# Patient Record
Sex: MALE | Race: OTHER | HISPANIC OR LATINO
[De-identification: names, ages, dates, MRNs, and addresses within clinical notes are randomized per-mention and may not be internally consistent; named-entity substitution may affect disease eponyms.]

---

## 2019-03-27 ENCOUNTER — APPOINTMENT (OUTPATIENT)
Dept: UROLOGY | Facility: CLINIC | Age: 59
End: 2019-03-27
Payer: MEDICAID

## 2019-03-27 VITALS
HEIGHT: 65 IN | WEIGHT: 187 LBS | DIASTOLIC BLOOD PRESSURE: 91 MMHG | HEART RATE: 54 BPM | SYSTOLIC BLOOD PRESSURE: 145 MMHG | BODY MASS INDEX: 31.16 KG/M2 | OXYGEN SATURATION: 99 %

## 2019-03-27 DIAGNOSIS — N50.811 RIGHT TESTICULAR PAIN: ICD-10-CM

## 2019-03-27 DIAGNOSIS — N20.0 CALCULUS OF KIDNEY: ICD-10-CM

## 2019-03-27 DIAGNOSIS — Z78.9 OTHER SPECIFIED HEALTH STATUS: ICD-10-CM

## 2019-03-27 LAB
APPEARANCE: CLEAR
BACTERIA: NEGATIVE
BILIRUBIN URINE: NEGATIVE
BLOOD URINE: NEGATIVE
COLOR: YELLOW
GLUCOSE QUALITATIVE U: NEGATIVE
HYALINE CASTS: 0 /LPF
KETONES URINE: NEGATIVE
LEUKOCYTE ESTERASE URINE: NEGATIVE
MICROSCOPIC-UA: NORMAL
NITRITE URINE: NEGATIVE
PH URINE: 6.5
PROTEIN URINE: NEGATIVE
RED BLOOD CELLS URINE: 2 /HPF
SPECIFIC GRAVITY URINE: 1.02
SQUAMOUS EPITHELIAL CELLS: 0 /HPF
UROBILINOGEN URINE: NORMAL
WHITE BLOOD CELLS URINE: 0 /HPF

## 2019-03-27 PROCEDURE — 99204 OFFICE O/P NEW MOD 45 MIN: CPT

## 2019-03-27 NOTE — HISTORY OF PRESENT ILLNESS
[FreeTextEntry1] : Referred for evaluation of kidney stones.\par Has prior hx of kidney stone about 8-9 years ago.  Reports being treated medically, no surgery.\par Recently onset of non specific left flank pain.  Has been told by PCP that has microscopic hematuria.  \par No dysuria, gross hematuria.  No recent UTI.  No other urinary complaints.\par \par Has chronic right testis pain that started after right inguinal hernia repair.  No swelling, erythema.  No associated N/V.

## 2019-03-27 NOTE — PHYSICAL EXAM
[General Appearance - Well Developed] : well developed [General Appearance - Well Nourished] : well nourished [Normal Appearance] : normal appearance [Well Groomed] : well groomed [General Appearance - In No Acute Distress] : no acute distress [Edema] : no peripheral edema [Respiration, Rhythm And Depth] : normal respiratory rhythm and effort [Exaggerated Use Of Accessory Muscles For Inspiration] : no accessory muscle use [Abdomen Soft] : soft [Abdomen Tenderness] : non-tender [Costovertebral Angle Tenderness] : no ~M costovertebral angle tenderness [Urethral Meatus] : meatus normal [Penis Abnormality] : normal uncircumcised penis [Urinary Bladder Findings] : the bladder was normal on palpation [Scrotum] : the scrotum was normal [Testes Mass (___cm)] : there were no testicular masses [No Prostate Nodules] : no prostate nodules [FreeTextEntry1] : No recurrent inguinal hernia. [Normal Station and Gait] : the gait and station were normal for the patient's age [] : no rash [No Focal Deficits] : no focal deficits [Oriented To Time, Place, And Person] : oriented to person, place, and time [Affect] : the affect was normal [Mood] : the mood was normal [Not Anxious] : not anxious [No Palpable Adenopathy] : no palpable adenopathy

## 2019-03-27 NOTE — REVIEW OF SYSTEMS
[Shortness Of Breath] : shortness of breath [Wheezing] : wheezing [Dizziness] : dizziness [Limb Weakness] : limb weakness [Difficulty Walking] : difficulty walking [Negative] : Heme/Lymph

## 2019-03-27 NOTE — ASSESSMENT
[FreeTextEntry1] : UA cytology today.\par CT urogram ordered.  Will call with results.\par All questions answered.

## 2019-04-12 ENCOUNTER — FORM ENCOUNTER (OUTPATIENT)
Age: 59
End: 2019-04-12

## 2019-04-12 LAB
BACTERIA UR CULT: NORMAL
URINE CYTOLOGY: NORMAL

## 2019-04-13 ENCOUNTER — OUTPATIENT (OUTPATIENT)
Dept: OUTPATIENT SERVICES | Facility: HOSPITAL | Age: 59
LOS: 1 days | End: 2019-04-13
Payer: MEDICAID

## 2019-04-13 ENCOUNTER — APPOINTMENT (OUTPATIENT)
Dept: CT IMAGING | Facility: CLINIC | Age: 59
End: 2019-04-13

## 2019-04-13 DIAGNOSIS — N20.0 CALCULUS OF KIDNEY: ICD-10-CM

## 2019-04-13 PROCEDURE — 74178 CT ABD&PLV WO CNTR FLWD CNTR: CPT | Mod: 26

## 2019-04-13 PROCEDURE — 74178 CT ABD&PLV WO CNTR FLWD CNTR: CPT

## 2019-04-17 PROBLEM — Z00.00 ENCOUNTER FOR PREVENTIVE HEALTH EXAMINATION: Noted: 2019-04-17

## 2019-05-01 ENCOUNTER — OTHER (OUTPATIENT)
Age: 59
End: 2019-05-01

## 2019-05-01 DIAGNOSIS — R91.1 SOLITARY PULMONARY NODULE: ICD-10-CM

## 2020-04-08 ENCOUNTER — EMERGENCY (EMERGENCY)
Facility: HOSPITAL | Age: 60
LOS: 0 days | Discharge: HOME | End: 2020-04-08
Payer: COMMERCIAL

## 2020-04-08 VITALS
RESPIRATION RATE: 15 BRPM | DIASTOLIC BLOOD PRESSURE: 102 MMHG | SYSTOLIC BLOOD PRESSURE: 155 MMHG | TEMPERATURE: 101 F | HEART RATE: 95 BPM | OXYGEN SATURATION: 99 %

## 2020-04-08 DIAGNOSIS — R07.0 PAIN IN THROAT: ICD-10-CM

## 2020-04-08 DIAGNOSIS — R05 COUGH: ICD-10-CM

## 2020-04-08 DIAGNOSIS — R50.9 FEVER, UNSPECIFIED: ICD-10-CM

## 2020-04-08 DIAGNOSIS — U07.1 COVID-19: ICD-10-CM

## 2020-04-08 PROCEDURE — 99283 EMERGENCY DEPT VISIT LOW MDM: CPT

## 2020-04-08 PROCEDURE — 87635 SARS-COV-2 COVID-19 AMP PRB: CPT

## 2020-04-08 NOTE — ED STATDOCS - PATIENT PORTAL LINK FT
You can access the FollowMyHealth Patient Portal offered by [facility] by registering at the following website: [email or web address]. By joining [employer]’s FollowMyHealth portal, you will also be able to view your health information using other applications (apps) compatible with our system.

## 2020-04-08 NOTE — ED STATDOCS - OBJECTIVE STATEMENT
Pt presents to ED with fever, cough, body aches, sore throat x 7 days. Pt not recently exposed to COVID-19. Pt here for testing.

## 2020-04-08 NOTE — ED STATDOCS - NSFOLLOWUPINSTRUCTIONS_ED_ALL_ED_FT
How to get your Coronavirus (COVID-19) Testing Results:   Please be advised that you were tested for the coronavirus (COVID-19) in the Emergency Department at [facility].  You are to maintain self-quarantine procedures for 14 days until instructed otherwise by one of our healthcare agents. Please note that the test may take up to 2-4 days to result.  If you do not hear from us within 72 hours and you'd like to check on your results, you can call on of our coronavirus specialists at [address] [place] [address] (available 24/7).  Please DO NOT call the site where you received the test to obtain your results.

## 2020-04-08 NOTE — ED ADULT NURSE NOTE - CAS ELECT INFOMATION PROVIDED
DC instructions/DISCHARGE INSTRUCTIONS REVIEWED WITH PATIENT VERBALLY, PT VERBALIZED UNDERSTANDING OF DISCHARGE INSTRUCTIONS. PAPER COPY OF DISCHARGE INSTRUCTIONS GIVEN TO PATIENT WITH SELF MALENA AND COVID 19 INFORMATION.

## 2020-04-08 NOTE — ED ADULT NURSE NOTE - CHIEF COMPLAINT QUOTE
fever, cough, covid car swab    PATIENT WAS TREATED, EVALUATED AND DISCHARGED BY INTAKE PROVIDER. PLEASE SEE PROVIDER NOTE FOR ASSESSMENT.

## 2020-04-10 LAB — SARS-COV-2 RNA SPEC QL NAA+PROBE: DETECTED

## 2020-04-15 ENCOUNTER — EMERGENCY (EMERGENCY)
Facility: HOSPITAL | Age: 60
LOS: 1 days | Discharge: HOME | End: 2020-04-15
Attending: STUDENT IN AN ORGANIZED HEALTH CARE EDUCATION/TRAINING PROGRAM | Admitting: STUDENT IN AN ORGANIZED HEALTH CARE EDUCATION/TRAINING PROGRAM
Payer: COMMERCIAL

## 2020-04-15 VITALS
OXYGEN SATURATION: 99 % | WEIGHT: 186.95 LBS | HEART RATE: 86 BPM | DIASTOLIC BLOOD PRESSURE: 96 MMHG | TEMPERATURE: 98 F | SYSTOLIC BLOOD PRESSURE: 165 MMHG | HEIGHT: 65 IN | RESPIRATION RATE: 15 BRPM

## 2020-04-15 VITALS
DIASTOLIC BLOOD PRESSURE: 89 MMHG | SYSTOLIC BLOOD PRESSURE: 159 MMHG | RESPIRATION RATE: 16 BRPM | HEART RATE: 84 BPM | OXYGEN SATURATION: 98 %

## 2020-04-15 PROCEDURE — 99283 EMERGENCY DEPT VISIT LOW MDM: CPT

## 2020-04-15 PROCEDURE — 99283 EMERGENCY DEPT VISIT LOW MDM: CPT | Mod: 25

## 2020-04-15 PROCEDURE — 71046 X-RAY EXAM CHEST 2 VIEWS: CPT

## 2020-04-15 PROCEDURE — 71046 X-RAY EXAM CHEST 2 VIEWS: CPT | Mod: 26

## 2020-04-15 NOTE — ED PROVIDER NOTE - CHPI ED SYMPTOMS NEG
no diaphoresis/no edema/no chest pain/no cough/no fever/no headache/no hemoptysis/no wheezing/no chills/no body aches

## 2020-04-15 NOTE — ED PROVIDER NOTE - PROGRESS NOTE DETAILS
Patient looks well, no respiratory distress and CXR clear.  Patient given instructions on albuterol hand-held inhaler and given one to go home with.  D/c instructions given to patient and son who live together. Patient advised to f/u with PMD and return for any concerns.  COVID instructions given

## 2020-04-15 NOTE — ED ADULT TRIAGE NOTE - CHIEF COMPLAINT QUOTE
Pt states he is Covid-19 +, no fever x 4 days, onset 10 days ago. Pt states today he is feeling like he has trouble breathing

## 2020-04-15 NOTE — ED PROVIDER NOTE - OBJECTIVE STATEMENT
60 year old male with no significant PMH presents with SOB.  Patient was diagnosed with COVID on 4/4 at urgent care.  He was sent home on antibiotics x 5 days and was feeling better.  He reports no fever x 5 days.  However, he has been suffering from intermittent SOB at rest for the past several days.  No chest pain, cough.  Today he felt the dyspnea worsened.  He is not a smoker. Patient lives with his son.  PMD Dr Coats

## 2020-04-15 NOTE — ED PROVIDER NOTE - CLINICAL SUMMARY MEDICAL DECISION MAKING FREE TEXT BOX
60 year old male diagnosed with COVID on 4/4 presents with intermittent SOB, feeling worse today.  No chest pain, cough, fever.  Non-toxic in appearance.  O2 sat 99% on RA, afebrile.  CXR, observe, reassess

## 2020-04-15 NOTE — ED PROVIDER NOTE - CARE PROVIDER_API CALL
Byron Coats)  Medicine  [address] [place] [id number]  Phone: [phone]  Fax: [phone]  Follow Up Time:

## 2020-04-15 NOTE — ED PROVIDER NOTE - NSFOLLOWUPINSTRUCTIONS_ED_ALL_ED_FT
Please take the albuterol inhaler as prescribed and follow up with your primary care doctor.  Drink plenty of fluids and take Tylenol for pain or fever.  Return to the ER for persistent cough, fever, shortness of breath, respiratory distress, or any other concerns.

## 2020-04-15 NOTE — ED PROVIDER NOTE - PATIENT PORTAL LINK FT
You can access the FollowMyHealth Patient Portal offered by [facility] by registering at the following website: [email or web address]. By joining [employer]’s FollowMyHealth portal, you will also be able to view your health information using other applications (apps) compatible with our system.

## 2020-04-25 ENCOUNTER — EMERGENCY (EMERGENCY)
Facility: HOSPITAL | Age: 60
LOS: 1 days | Discharge: HOME | End: 2020-04-25
Attending: EMERGENCY MEDICINE | Admitting: EMERGENCY MEDICINE
Payer: COMMERCIAL

## 2020-04-25 VITALS
RESPIRATION RATE: 16 BRPM | TEMPERATURE: 99 F | HEART RATE: 89 BPM | SYSTOLIC BLOOD PRESSURE: 135 MMHG | OXYGEN SATURATION: 98 % | DIASTOLIC BLOOD PRESSURE: 83 MMHG

## 2020-04-25 VITALS
SYSTOLIC BLOOD PRESSURE: 162 MMHG | RESPIRATION RATE: 15 BRPM | TEMPERATURE: 98 F | DIASTOLIC BLOOD PRESSURE: 85 MMHG | OXYGEN SATURATION: 99 % | HEART RATE: 86 BPM

## 2020-04-25 LAB
ALBUMIN SERPL ELPH-MCNC: 4.1 G/DL — SIGNIFICANT CHANGE UP (ref 3.3–5)
ALP SERPL-CCNC: 104 U/L — SIGNIFICANT CHANGE UP (ref 40–120)
ALT FLD-CCNC: 36 U/L — SIGNIFICANT CHANGE UP (ref 12–78)
ANION GAP SERPL CALC-SCNC: 8 MMOL/L — SIGNIFICANT CHANGE UP (ref 5–17)
AST SERPL-CCNC: 28 U/L — SIGNIFICANT CHANGE UP (ref 15–37)
BASOPHILS # BLD AUTO: 0.02 K/UL — SIGNIFICANT CHANGE UP (ref 0–0.2)
BASOPHILS NFR BLD AUTO: 0.3 % — SIGNIFICANT CHANGE UP (ref 0–2)
BILIRUB SERPL-MCNC: 0.7 MG/DL — SIGNIFICANT CHANGE UP (ref 0.2–1.2)
BUN SERPL-MCNC: 12 MG/DL — SIGNIFICANT CHANGE UP (ref 7–23)
CALCIUM SERPL-MCNC: 9.1 MG/DL — SIGNIFICANT CHANGE UP (ref 8.5–10.1)
CHLORIDE SERPL-SCNC: 106 MMOL/L — SIGNIFICANT CHANGE UP (ref 96–108)
CK MB BLD-MCNC: <1.8 % — SIGNIFICANT CHANGE UP (ref 0–3.5)
CK MB CFR SERPL CALC: <1 NG/ML — SIGNIFICANT CHANGE UP (ref 0–3.6)
CK SERPL-CCNC: 57 U/L — SIGNIFICANT CHANGE UP (ref 26–308)
CO2 SERPL-SCNC: 25 MMOL/L — SIGNIFICANT CHANGE UP (ref 22–31)
CREAT SERPL-MCNC: 1 MG/DL — SIGNIFICANT CHANGE UP (ref 0.5–1.3)
D DIMER BLD IA.RAPID-MCNC: 154 NG/ML DDU — SIGNIFICANT CHANGE UP
EOSINOPHIL # BLD AUTO: 0.08 K/UL — SIGNIFICANT CHANGE UP (ref 0–0.5)
EOSINOPHIL NFR BLD AUTO: 1.2 % — SIGNIFICANT CHANGE UP (ref 0–6)
GLUCOSE SERPL-MCNC: 117 MG/DL — HIGH (ref 70–99)
HCT VFR BLD CALC: 42.4 % — SIGNIFICANT CHANGE UP (ref 39–50)
HGB BLD-MCNC: 14.8 G/DL — SIGNIFICANT CHANGE UP (ref 13–17)
IMM GRANULOCYTES NFR BLD AUTO: 0.3 % — SIGNIFICANT CHANGE UP (ref 0–1.5)
INR BLD: 1.08 RATIO — SIGNIFICANT CHANGE UP (ref 0.88–1.16)
LYMPHOCYTES # BLD AUTO: 2.51 K/UL — SIGNIFICANT CHANGE UP (ref 1–3.3)
LYMPHOCYTES # BLD AUTO: 38 % — SIGNIFICANT CHANGE UP (ref 13–44)
MCHC RBC-ENTMCNC: 32.7 PG — SIGNIFICANT CHANGE UP (ref 27–34)
MCHC RBC-ENTMCNC: 34.9 GM/DL — SIGNIFICANT CHANGE UP (ref 32–36)
MCV RBC AUTO: 93.6 FL — SIGNIFICANT CHANGE UP (ref 80–100)
MONOCYTES # BLD AUTO: 0.55 K/UL — SIGNIFICANT CHANGE UP (ref 0–0.9)
MONOCYTES NFR BLD AUTO: 8.3 % — SIGNIFICANT CHANGE UP (ref 2–14)
NEUTROPHILS # BLD AUTO: 3.43 K/UL — SIGNIFICANT CHANGE UP (ref 1.8–7.4)
NEUTROPHILS NFR BLD AUTO: 51.9 % — SIGNIFICANT CHANGE UP (ref 43–77)
NRBC # BLD: 0 /100 WBCS — SIGNIFICANT CHANGE UP (ref 0–0)
PLATELET # BLD AUTO: 198 K/UL — SIGNIFICANT CHANGE UP (ref 150–400)
POTASSIUM SERPL-MCNC: 4.1 MMOL/L — SIGNIFICANT CHANGE UP (ref 3.5–5.3)
POTASSIUM SERPL-SCNC: 4.1 MMOL/L — SIGNIFICANT CHANGE UP (ref 3.5–5.3)
PROT SERPL-MCNC: 8 G/DL — SIGNIFICANT CHANGE UP (ref 6–8.3)
PROTHROM AB SERPL-ACNC: 12.1 SEC — SIGNIFICANT CHANGE UP (ref 10–12.9)
RBC # BLD: 4.53 M/UL — SIGNIFICANT CHANGE UP (ref 4.2–5.8)
RBC # FLD: 12.4 % — SIGNIFICANT CHANGE UP (ref 10.3–14.5)
SODIUM SERPL-SCNC: 139 MMOL/L — SIGNIFICANT CHANGE UP (ref 135–145)
TROPONIN I SERPL-MCNC: <.015 NG/ML — SIGNIFICANT CHANGE UP (ref 0.01–0.04)
WBC # BLD: 6.61 K/UL — SIGNIFICANT CHANGE UP (ref 3.8–10.5)
WBC # FLD AUTO: 6.61 K/UL — SIGNIFICANT CHANGE UP (ref 3.8–10.5)

## 2020-04-25 PROCEDURE — 71045 X-RAY EXAM CHEST 1 VIEW: CPT

## 2020-04-25 PROCEDURE — 71045 X-RAY EXAM CHEST 1 VIEW: CPT | Mod: 26

## 2020-04-25 PROCEDURE — 80053 COMPREHEN METABOLIC PANEL: CPT

## 2020-04-25 PROCEDURE — 85027 COMPLETE CBC AUTOMATED: CPT

## 2020-04-25 PROCEDURE — 93010 ELECTROCARDIOGRAM REPORT: CPT

## 2020-04-25 PROCEDURE — 82550 ASSAY OF CK (CPK): CPT

## 2020-04-25 PROCEDURE — 82553 CREATINE MB FRACTION: CPT

## 2020-04-25 PROCEDURE — 84484 ASSAY OF TROPONIN QUANT: CPT

## 2020-04-25 PROCEDURE — 85610 PROTHROMBIN TIME: CPT

## 2020-04-25 PROCEDURE — 36415 COLL VENOUS BLD VENIPUNCTURE: CPT

## 2020-04-25 PROCEDURE — 99283 EMERGENCY DEPT VISIT LOW MDM: CPT | Mod: 25

## 2020-04-25 PROCEDURE — 99284 EMERGENCY DEPT VISIT MOD MDM: CPT

## 2020-04-25 PROCEDURE — 85379 FIBRIN DEGRADATION QUANT: CPT

## 2020-04-25 PROCEDURE — 93005 ELECTROCARDIOGRAM TRACING: CPT

## 2020-04-25 NOTE — ED PROVIDER NOTE - NSFOLLOWUPINSTRUCTIONS_ED_ALL_ED_FT
COVID 19, también conocido bowen enfermedad por coronavirus o nuevo coronavirus, es un tipo de virus que causa enfermedad respiratoria. [place] puede derivar en inflamación y la acumulación de mucosidad y líquidos en las vías respiratorias de los pulmones (neumonia). Hay diferentes tipos de coronavirus. La mayoría de estos virus sólo afectan a los animales, adriano a veces, estos virus pueden mutar e infectar a las personas.  ¿Cuáles son las causas?  Esta enfermedad es causada por un virus. Usted puede contagiarse con jeanie virus:  Al aspirar las gotitas que jazmyn persona infectada elimina al toser o estornudar.Al tocar algo, bowen jazmyn lynn o el pomo de jazmyn yu, que estuvo expuesto al virus (contaminado) y luego tocarse la boca, nariz o los ojos.Estar cerca de animales que transmiten el virus o comer carne cruda o poco cocida, o productos de origen animal que contengan el virus.¿Qué incrementa el riesgo?  Es más probable que tengan esta afección las personas que:  Viven o viajan a jazmyn joe donde hay un brote de COVID-19.Entran en contacto con jazmyn persona enferma que recientemente viajó a jazmyn joe con un brote de COVID-19.Cuidan o viven con jazmyn persona infectada por el COVID-19.¿Cuáles son los signos o los síntomas?  El COVID-19 causa jazmyn enfermedad respiratoria que puede ocasionar neumonía. Los síntomas de la neumonía incluyen los siguientes:  Fiebre.Tos.Dificultad para respirar.¿Cómo se diagnostica?  Esta afección se puede diagnosticar en función de lo siguiente:  Rayne signos y síntomas, especialmente si:  Vive en jazmyn joe donde hay un brote de COVID-19.Viajó recientemente a jazmyn joe donde el virus es frecuente.Cuida o vive con jazmyn persona a quien se le diagnosticó el COVID-19.Un examen físico.Análisis de laboratorio que pueden incluir:  Un hisopado nasal para esther jazmyn muestra de líquido de la nariz.Un hisopado de garganta para esther jazmyn muestra de líquido de la garganta.Jazmyn muestra de mucosidad de los pulmones (esputo).Análisis de serenity.¿Cómo se trata?  No hay ningún medicamento para tratar el COVID-19. El médico le informará sobre las maneras de tratar los síntomas. Estos pueden incluir reposo, líquidos y medicamentos de venta judson.  Siga estas indicaciones en hernadez casa:  Estilo de ramy     Use un humidificador de aire frío para agregar humedad al aire. [place] puede ayudarlo a que respire mejor.No consuma ningún producto que contenga nicotina o tabaco, bowen cigarrillos, cigarrillos electrónicos y tabaco de mascar. Si necesita ayuda para dejar de fumar, consulte al médico.[place] reposo en hernadez casa bowen se lo haya indicado el médico.Retome rayne actividades normales según lo indicado por el médico. Pregúntele al médico qué actividades son seguras para usted.Indicaciones generales     Use los medicamentos de venta judson y los recetados solamente bowen se lo haya indicado el médico.Paulina suficiente líquido bowen para mantener la orina de color amarillo pálido.Concurra a todas las visitas de seguimiento bowen se lo haya indicado el médico. [place] es importante.¿Cómo se aydee?     No hay ninguna vacuna que ayude a prevenir la infección por el COVID-19. Sin embargo, hay medidas que puede esther para protegerse y proteger a otras personas de jeanie virus.  Para protegerse:     No viaje a zonas donde el COVID-19 sea un riesgo. Las zonas donde se informa el coronavirus cambian con frecuencia. Para identificar las zonas de alto riesgo, consulte el sitio web de viajes de los Centers for Disease Control and Prevention (Centros para el Control y Prevención de Enfermedades) wwwnc.cdc.gov/travel/noticesSi vive o debe viajar a jazmyn joe donde el coronavirus es un riesgo, tome precauciones para evitar infecciones.  Aléjese de las personas enfermas.Aléjese de lugares donde haya animales que puedan transmitir el virus. Estos incluyen lugares donde se venden los animales y los productos de origen animal. Tenga en cuenta que tanto los animales vivos bowen los muertos pueden transmitir el virus.Lávese las sophie frecuentemente con agua y jabón. Use desinfectante para sophie con alcohol si no dispone de agua y jabón.Evite tocarse la boca, la david, los ojos o la nariz.Cómo proteger a los demás:     Si tiene síntomas, tome medidas para evitar que el virus se propague a otras personas.  Si erik que tiene jazmyn infección por coronavirus, comuníquese de inmediato con hernadez médico. Informe al equipo de atención médica que erik que puede tener jazmyn infección por el COVID-19.Quédese en hernadez casa. Salga de hernadez casa solo para buscar atención médica.No viaje mientras esté enfermo.Lávese las sophie frecuentemente con agua y jabón. Use desinfectante para sophie con alcohol si no dispone de agua y jabón.Manténgase alejado de quienes vivan con usted. Si es posible, permanezca en hernadez habitación, separado de los demás. Utilice un baño diferente.Asegúrese de que todas las personas que viven en hernadez casa se laven tyson las sophie y con frecuencia.Tosa o estornude en un pañuelo de papel o sobre hernadez manga o codo. No tosa o estornude al aire ni se cubra la boca o la nariz con la mano.Use un barbijo.Dónde buscar más información  Centers for Disease Control and Prevention (Centros para el Control y la Prevención de Enfermedades): www.cdc.gov/coronavirus/2019-ncov/index.htmlWor Health Organization (Organización Mundial de la [place]): www.who.int/health-topics/coronavirusComuníquese con un médico si:  Ha viajado a jazmyn joe donde el COVID-19 es un riesgo y tiene síntomas de infección.Tiene contacto con alguien que ha viajado a jazmyn joe donde el COVID-19 es un riesgo y usted tiene síntomas de infección.Solicite ayuda inmediatamente si:  Tiene dificultad para respirar.Siente dolor en el pecho.Resumen  El COVID-19 es causado por un tipo de virus que causa enfermedad respiratoria. [place] puede derivar en inflamación y la acumulación de mucosidad y líquidos en las vías respiratorias de los pulmones (neumonia).Es más probable que desarrolle esta afección si vive o viaja a jazmyn joe con un brote del COVID-19.No hay ningún medicamento para tratar el COVID-19. El médico le informará sobre las maneras de tratar los síntomas. [place] medidas para protegerse y proteger a los demás contra las infecciones. Lávese las sophie con frecuencia. Manténgase alejado de otras personas que estén enfermas y use un barbijo si está enfermo. Esta información no tiene bowen fin reemplazar el consejo del médico. Asegúrese de hacerle al médico cualquier pregunta que tenga.    Document Released: 02/14/2020 Document Revised: 04/01/2020 Document Reviewed: 02/15/2020  Elsevier Patient Education © 2020 Elsevier Inc.

## 2020-04-25 NOTE — ED ADULT NURSE REASSESSMENT NOTE - NS ED NURSE REASSESS COMMENT FT1
Pt. provide with pulse oximeter for discharge home. Re-enforced instructions for use of pulse oximeter after MD. Pt. verbalized understanding of education provided.

## 2020-04-25 NOTE — ED PROVIDER NOTE - PATIENT PORTAL LINK FT
You can access the FollowMyHealth Patient Portal offered by [facility] by registering at the following website: [email or web address]. By joining [employer]’s FollowMyHealth portal, you will also be able to view your health information using other applications (apps) compatible with our system.

## 2020-04-25 NOTE — ED ADULT NURSE NOTE - OBJECTIVE STATEMENT
Pt. complaining of chest pressure and burning x 5hours with intermittent radiation to generalized body. Pt. denied fevers, stated he was previously diagnosed Covid-19 positive.

## 2020-04-25 NOTE — ED PROVIDER NOTE - OBJECTIVE STATEMENT
60 male presents to ER c/o chest discomfort. Patient states 4/8/20 he went to [facility] for fever, sore throat, tested positive for covid, not requiring o2, sent home, came to [facility] ER 4/15/20 for shortness of breath, had chest xray and read as lungs clear, o2 sat normal, dc home, patient states fever and cough has resolved, feeling left sided chest discomfort with some shortness of breath.

## 2020-04-25 NOTE — ED PROVIDER NOTE - CARE PROVIDER_API CALL
Byron Coats)  Medicine  [address] [place] [address]  Phone: [phone]  Fax: [phone]  Follow Up Time:

## 2020-05-09 ENCOUNTER — EMERGENCY (EMERGENCY)
Facility: HOSPITAL | Age: 60
LOS: 1 days | Discharge: HOME | End: 2020-05-09
Attending: EMERGENCY MEDICINE | Admitting: EMERGENCY MEDICINE
Payer: COMMERCIAL

## 2020-05-09 VITALS
OXYGEN SATURATION: 98 % | HEART RATE: 70 BPM | TEMPERATURE: 98 F | RESPIRATION RATE: 18 BRPM | SYSTOLIC BLOOD PRESSURE: 146 MMHG | DIASTOLIC BLOOD PRESSURE: 86 MMHG

## 2020-05-09 VITALS
HEART RATE: 79 BPM | SYSTOLIC BLOOD PRESSURE: 171 MMHG | RESPIRATION RATE: 18 BRPM | DIASTOLIC BLOOD PRESSURE: 99 MMHG | OXYGEN SATURATION: 99 % | TEMPERATURE: 98 F | HEIGHT: 65 IN | WEIGHT: 179.9 LBS

## 2020-05-09 LAB
ALBUMIN SERPL ELPH-MCNC: 4.1 G/DL — SIGNIFICANT CHANGE UP (ref 3.3–5)
ALP SERPL-CCNC: 103 U/L — SIGNIFICANT CHANGE UP (ref 40–120)
ALT FLD-CCNC: 34 U/L — SIGNIFICANT CHANGE UP (ref 12–78)
ANION GAP SERPL CALC-SCNC: 9 MMOL/L — SIGNIFICANT CHANGE UP (ref 5–17)
AST SERPL-CCNC: 26 U/L — SIGNIFICANT CHANGE UP (ref 15–37)
BILIRUB SERPL-MCNC: 1 MG/DL — SIGNIFICANT CHANGE UP (ref 0.2–1.2)
BUN SERPL-MCNC: 9 MG/DL — SIGNIFICANT CHANGE UP (ref 7–23)
CALCIUM SERPL-MCNC: 9.1 MG/DL — SIGNIFICANT CHANGE UP (ref 8.5–10.1)
CHLORIDE SERPL-SCNC: 107 MMOL/L — SIGNIFICANT CHANGE UP (ref 96–108)
CO2 SERPL-SCNC: 22 MMOL/L — SIGNIFICANT CHANGE UP (ref 22–31)
CREAT SERPL-MCNC: 0.78 MG/DL — SIGNIFICANT CHANGE UP (ref 0.5–1.3)
GLUCOSE SERPL-MCNC: 99 MG/DL — SIGNIFICANT CHANGE UP (ref 70–99)
HCT VFR BLD CALC: 41.1 % — SIGNIFICANT CHANGE UP (ref 39–50)
HGB BLD-MCNC: 14.7 G/DL — SIGNIFICANT CHANGE UP (ref 13–17)
MAGNESIUM SERPL-MCNC: 2.1 MG/DL — SIGNIFICANT CHANGE UP (ref 1.6–2.6)
MCHC RBC-ENTMCNC: 32.9 PG — SIGNIFICANT CHANGE UP (ref 27–34)
MCHC RBC-ENTMCNC: 35.8 GM/DL — SIGNIFICANT CHANGE UP (ref 32–36)
MCV RBC AUTO: 91.9 FL — SIGNIFICANT CHANGE UP (ref 80–100)
NRBC # BLD: 0 /100 WBCS — SIGNIFICANT CHANGE UP (ref 0–0)
PLATELET # BLD AUTO: 237 K/UL — SIGNIFICANT CHANGE UP (ref 150–400)
POTASSIUM SERPL-MCNC: 3.8 MMOL/L — SIGNIFICANT CHANGE UP (ref 3.5–5.3)
POTASSIUM SERPL-SCNC: 3.8 MMOL/L — SIGNIFICANT CHANGE UP (ref 3.5–5.3)
PROT SERPL-MCNC: 7.9 G/DL — SIGNIFICANT CHANGE UP (ref 6–8.3)
RBC # BLD: 4.47 M/UL — SIGNIFICANT CHANGE UP (ref 4.2–5.8)
RBC # FLD: 12.8 % — SIGNIFICANT CHANGE UP (ref 10.3–14.5)
SODIUM SERPL-SCNC: 138 MMOL/L — SIGNIFICANT CHANGE UP (ref 135–145)
TROPONIN I SERPL-MCNC: <.015 NG/ML — SIGNIFICANT CHANGE UP (ref 0.01–0.04)
WBC # BLD: 6.07 K/UL — SIGNIFICANT CHANGE UP (ref 3.8–10.5)
WBC # FLD AUTO: 6.07 K/UL — SIGNIFICANT CHANGE UP (ref 3.8–10.5)

## 2020-05-09 PROCEDURE — 71045 X-RAY EXAM CHEST 1 VIEW: CPT

## 2020-05-09 PROCEDURE — 99284 EMERGENCY DEPT VISIT MOD MDM: CPT | Mod: 25

## 2020-05-09 PROCEDURE — 93010 ELECTROCARDIOGRAM REPORT: CPT

## 2020-05-09 PROCEDURE — 96374 THER/PROPH/DIAG INJ IV PUSH: CPT

## 2020-05-09 PROCEDURE — 71045 X-RAY EXAM CHEST 1 VIEW: CPT | Mod: 26

## 2020-05-09 PROCEDURE — 99284 EMERGENCY DEPT VISIT MOD MDM: CPT

## 2020-05-09 PROCEDURE — 84484 ASSAY OF TROPONIN QUANT: CPT

## 2020-05-09 PROCEDURE — 93005 ELECTROCARDIOGRAM TRACING: CPT

## 2020-05-09 PROCEDURE — 85027 COMPLETE CBC AUTOMATED: CPT

## 2020-05-09 PROCEDURE — 83735 ASSAY OF MAGNESIUM: CPT

## 2020-05-09 PROCEDURE — 36415 COLL VENOUS BLD VENIPUNCTURE: CPT

## 2020-05-09 PROCEDURE — 80053 COMPREHEN METABOLIC PANEL: CPT

## 2020-05-09 RX ORDER — KETOROLAC TROMETHAMINE 30 MG/ML
15 SYRINGE (ML) INJECTION ONCE
Refills: 0 | Status: DISCONTINUED | OUTPATIENT
Start: 2020-05-09 | End: 2020-05-09

## 2020-05-09 RX ADMIN — Medication 15 MILLIGRAM(S): at 19:24

## 2020-05-09 NOTE — ED ADULT NURSE NOTE - OBJECTIVE STATEMENT
Pt received in bed alert and oriented and resting in bed with the c/o htn since 3am today. As per Md's orders IV rowdy placed blood specimen obtained and sent to the lab. Pt stable and VS taken and charted. Pt denies any pain or discomfort as of this time. Nursing care ongoing and safety maintained.

## 2020-05-09 NOTE — ED PROVIDER NOTE - CARE PROVIDER_API CALL
Kirill Hair)  Cardiovascular Disease  [address] [place] [address]  Phone: [phone]  Fax: [phone]  Follow Up Time:

## 2020-05-09 NOTE — ED PROVIDER NOTE - OBJECTIVE STATEMENT
59 yo male with recent dx of HTN (started on metoprolol 25 mg qd on 5/7) presents to the ED c/o intermittent left sided, nonradiating, chest discomfort x 1 month. Patient diagnosed COVID+ on 4/8. Since then patient has been having chest discomfort. not associated with exertion or inspiration. + associated with SOB. Denies fever, chills, cough, abd pain, N/V/D, urinary sxs, flank pain. nonsmoker. no previous cardiac workup. no family hx CAD.

## 2020-05-09 NOTE — ED PROVIDER NOTE - PHYSICAL EXAMINATION
GENERAL:  well appearing, non-toxic male in no acute distress  SKIN: skin warm, pink and dry. MMM. no rash to chest wall. cap refill < 2 sec   ENT:  Airway intact. Patent oropharynx without erythema  NECK: Neck supple. No meningismus, or JVD. Trachea midline  PULM: CTAB. Normal respiratory effort. No respiratory distress. No wheezes, stridor, rales or rhonchi. No retractions  CV: RRR, no M/R/G.   ABD: Soft, non-tender, non-distended  MSK: FROM of all extremities. + left sided chest wall tenderness. radial pulses equal and intact bilaterally. no calf swelling or tenderness.   neuro: A+Ox2. no sensory or motor deficits, CN II-XII intact. No speech slurring, pronator drift, facial asymmetry. Normal finger-to-nose  b/l. 5/5 strength throughout. Normal gait. Negative Romberg.  PSYCH: appropriate behavior, cooperative.

## 2020-05-09 NOTE — ED PROVIDER NOTE - PATIENT PORTAL LINK FT
You can access the FollowMyHealth Patient Portal offered by [facility] by registering at the following website: [email or web address]. By joining [employer]’s FollowMyHealth portal, you will also be able to view your health information using other applications (apps) compatible with our system.

## 2020-05-09 NOTE — ED ADULT NURSE REASSESSMENT NOTE - NS ED NURSE REASSESS COMMENT FT1
Received report from Maryann COPELAND. Patient resting in bed on cardiac monitor. Safety and comfort measures provided and maintained.

## 2020-05-09 NOTE — ED PROVIDER NOTE - ATTENDING CONTRIBUTION TO CARE
Pt is a 59 yo male who presents to the ED with a cc of hypertension.  PMHx of hypertension.  Pt reports that he had no significant past medical history prior to his diagnosis of COVID.  He had first noted symptoms of this infection around 4/1.  Prior to that he had been working at [employer] so he has unknown sick contacts.  Pt reports that early April he noted that he was experiencing fevers, chills, diffuse myalgias and a sore throat.  He was confirmed COVID positive around 4/4.  Pt was then again seen in the ED on 4/8 for similar symptoms and was discharged home.  He again returned to the ED on 4/15 for SOB he was elv and discharged home.  Again he was seen on 4/25 with c/o left sided chest pain and SOB was worked up and discharged home.  He reports that during this time it was noted that his blood pressures were elevated and so he followed up with his PMD on Thursday and was started on Metoprolol.  Pt reports that  he is no longer experiencing fevers, diffuse muscle pain, or sore throat. He still has intermittent chills and notes that several times a day he feels like his hands and feet are very cold bilaterally.  He further goes on to state that when his blood pressure is elevated he does have a mild HA which resolves when his blood pressure corrects.  Today he reports that he awoke around 3 am with chest pressure tightness and mild SOB.  He also reported a left sided HA at that time which has since resolved.  He noted that his blood pressure was elevated and states that he did take his Metoprolol around 6 am but did not note a significant change so he became concerned and came to the ED for further work up.  Upon arrival he does admit to significant improvement in symptoms but still has mild chest pressure.  He further reports that he has been experiencing a non productive cough. Pt currently denies HA, visual changes, N/V/D/C, abd pain, ext numbness or weakness.  Pain is not pleuritic in nature and is not worsened with exertion.  No increased discomfort upon leaning forward or lying back although pt does report that the pain does appear to be reproducible in nature.  Pt has no known underlying CAD and has not followed with a cardiologist.  On exam pt lying in bed NAD, NCAT, PERRL, EOMI, heart RRR, lungs CTA, abd soft NT/ND.  No TTP to bilateral temporal arteries.  FROM of neck with no meningeal signs.  CN II-XII intact with no focal deficits, no ataxia noted.  At this time BP has significantly improved.  Pt with TTP to left lateral chest wall on exam no overlying skin changes.  Pt with chest discomfort in the setting of known COVID and recent HTN diagnosis.  Has just started medication for BP on Thursday and at this time BP stable.  Will obtain screening labs, CE, EKG, chest x-ray.  As symptoms began at 3 am only one set of enzymes is required.  Pain is reproducible and is likely more related to underlying costochondritis chest wall strain given  recent viral illness.  Will medicate for pain.  Will monitor BP.  Discussed with pt need to follow up with PMD or cardiology for possible medication adjustment. As BP stable at this time will not adjust medications.  Pt with no hypoxia noted and no signs of respiratory distress.

## 2020-05-09 NOTE — ED ADULT TRIAGE NOTE - CHIEF COMPLAINT QUOTE
Presents with complaints of high blood pressure. States with his blood pressure being elevated he feels chest pressure, head pressure and very weak.

## 2020-05-09 NOTE — ED PROVIDER NOTE - CARE PLAN
Principal Discharge DX:	Chest pain  Secondary Diagnosis:	Hypertension Principal Discharge DX:	Chest pain  Secondary Diagnosis:	Hypertension  Secondary Diagnosis:	Chest wall pain

## 2020-05-09 NOTE — ED ADULT NURSE NOTE - SUICIDE SCREENING QUESTION 3
July 4, 2019      Isela Gray, NP  [address] [facility] [place] [address]           [facility]  [address]  [place] [address]  Phone: [phone]  Fax: [phone]          Patient: Hitesh Burns   MR Number: [id number]   YOB: 1979   Date of Visit: 7/3/2019       Dear Isela Gray:    Thank you for referring Hitesh Burns to me for evaluation. Attached you will find relevant portions of my assessment and plan of care.    If you have questions, please do not hesitate to call me. I look forward to following Hitesh Burns along with you.    Sincerely,    Leana Dave MD    Enclosure  CC:  No Recipients    If you would like to receive this communication electronically, please contact [email or web address] or [phone] to request more information on [employer] Link access.    For providers and/or their staff who would like to refer a patient to [facility], please contact us through our one-stop-shop provider referral line, [facility], at [phone].    If you feel you have received this communication in error or would no longer like to receive these types of communications, please e-mail [email or web address]         
No

## 2020-05-09 NOTE — ED PROVIDER NOTE - CLINICAL SUMMARY MEDICAL DECISION MAKING FREE TEXT BOX
Patient here for chest discomfort, sob x 1 month. ekg, labs, cxr unremarkable. REcommend follow up with pmd and cardiologist

## 2020-05-23 ENCOUNTER — EMERGENCY (EMERGENCY)
Facility: HOSPITAL | Age: 60
LOS: 1 days | Discharge: HOME | End: 2020-05-23
Attending: EMERGENCY MEDICINE | Admitting: EMERGENCY MEDICINE
Payer: COMMERCIAL

## 2020-05-23 VITALS
DIASTOLIC BLOOD PRESSURE: 84 MMHG | WEIGHT: 177.03 LBS | HEART RATE: 90 BPM | SYSTOLIC BLOOD PRESSURE: 160 MMHG | HEIGHT: 65 IN | TEMPERATURE: 98 F | RESPIRATION RATE: 18 BRPM | OXYGEN SATURATION: 96 %

## 2020-05-23 VITALS
HEART RATE: 59 BPM | DIASTOLIC BLOOD PRESSURE: 75 MMHG | SYSTOLIC BLOOD PRESSURE: 159 MMHG | OXYGEN SATURATION: 96 % | TEMPERATURE: 98 F | RESPIRATION RATE: 16 BRPM

## 2020-05-23 LAB
ALBUMIN SERPL ELPH-MCNC: 4.2 G/DL — SIGNIFICANT CHANGE UP (ref 3.3–5)
ALP SERPL-CCNC: 103 U/L — SIGNIFICANT CHANGE UP (ref 40–120)
ALT FLD-CCNC: 30 U/L — SIGNIFICANT CHANGE UP (ref 12–78)
ANION GAP SERPL CALC-SCNC: 8 MMOL/L — SIGNIFICANT CHANGE UP (ref 5–17)
AST SERPL-CCNC: 24 U/L — SIGNIFICANT CHANGE UP (ref 15–37)
BILIRUB SERPL-MCNC: 1 MG/DL — SIGNIFICANT CHANGE UP (ref 0.2–1.2)
BUN SERPL-MCNC: 16 MG/DL — SIGNIFICANT CHANGE UP (ref 7–23)
CALCIUM SERPL-MCNC: 9 MG/DL — SIGNIFICANT CHANGE UP (ref 8.5–10.1)
CHLORIDE SERPL-SCNC: 105 MMOL/L — SIGNIFICANT CHANGE UP (ref 96–108)
CO2 SERPL-SCNC: 22 MMOL/L — SIGNIFICANT CHANGE UP (ref 22–31)
CREAT SERPL-MCNC: 0.97 MG/DL — SIGNIFICANT CHANGE UP (ref 0.5–1.3)
D DIMER BLD IA.RAPID-MCNC: <150 NG/ML DDU — SIGNIFICANT CHANGE UP
GLUCOSE SERPL-MCNC: 103 MG/DL — HIGH (ref 70–99)
HCT VFR BLD CALC: 42.3 % — SIGNIFICANT CHANGE UP (ref 39–50)
HGB BLD-MCNC: 15.2 G/DL — SIGNIFICANT CHANGE UP (ref 13–17)
MCHC RBC-ENTMCNC: 33.2 PG — SIGNIFICANT CHANGE UP (ref 27–34)
MCHC RBC-ENTMCNC: 35.9 GM/DL — SIGNIFICANT CHANGE UP (ref 32–36)
MCV RBC AUTO: 92.4 FL — SIGNIFICANT CHANGE UP (ref 80–100)
NRBC # BLD: 0 /100 WBCS — SIGNIFICANT CHANGE UP (ref 0–0)
PLATELET # BLD AUTO: 199 K/UL — SIGNIFICANT CHANGE UP (ref 150–400)
POTASSIUM SERPL-MCNC: 3.9 MMOL/L — SIGNIFICANT CHANGE UP (ref 3.5–5.3)
POTASSIUM SERPL-SCNC: 3.9 MMOL/L — SIGNIFICANT CHANGE UP (ref 3.5–5.3)
PROT SERPL-MCNC: 7.8 G/DL — SIGNIFICANT CHANGE UP (ref 6–8.3)
RBC # BLD: 4.58 M/UL — SIGNIFICANT CHANGE UP (ref 4.2–5.8)
RBC # FLD: 12.6 % — SIGNIFICANT CHANGE UP (ref 10.3–14.5)
SODIUM SERPL-SCNC: 135 MMOL/L — SIGNIFICANT CHANGE UP (ref 135–145)
TROPONIN I SERPL-MCNC: <.015 NG/ML — SIGNIFICANT CHANGE UP (ref 0.01–0.04)
WBC # BLD: 9.86 K/UL — SIGNIFICANT CHANGE UP (ref 3.8–10.5)
WBC # FLD AUTO: 9.86 K/UL — SIGNIFICANT CHANGE UP (ref 3.8–10.5)

## 2020-05-23 PROCEDURE — 93010 ELECTROCARDIOGRAM REPORT: CPT

## 2020-05-23 PROCEDURE — 99285 EMERGENCY DEPT VISIT HI MDM: CPT

## 2020-05-23 PROCEDURE — 71045 X-RAY EXAM CHEST 1 VIEW: CPT

## 2020-05-23 PROCEDURE — 36415 COLL VENOUS BLD VENIPUNCTURE: CPT

## 2020-05-23 PROCEDURE — 93005 ELECTROCARDIOGRAM TRACING: CPT

## 2020-05-23 PROCEDURE — 80053 COMPREHEN METABOLIC PANEL: CPT

## 2020-05-23 PROCEDURE — 99284 EMERGENCY DEPT VISIT MOD MDM: CPT | Mod: 25

## 2020-05-23 PROCEDURE — 85379 FIBRIN DEGRADATION QUANT: CPT

## 2020-05-23 PROCEDURE — 71045 X-RAY EXAM CHEST 1 VIEW: CPT | Mod: 26

## 2020-05-23 PROCEDURE — 84484 ASSAY OF TROPONIN QUANT: CPT

## 2020-05-23 PROCEDURE — 85027 COMPLETE CBC AUTOMATED: CPT

## 2020-05-23 RX ORDER — AMLODIPINE BESYLATE 2.5 MG/1
1 TABLET ORAL
Qty: 14 | Refills: 0
Start: 2020-05-23 | End: 2020-06-05

## 2020-05-23 NOTE — ED PROVIDER NOTE - ATTENDING CONTRIBUTION TO CARE
Pt is a 61 yo male who presents to the ED with a cc of hypertension.  PMHx of hypertension.  Pt reports that he had no significant past medical history prior to his diagnosis of COVID.  He had first noted symptoms of this infection around 4/1.  Prior to that he had been working at [employer] so he has unknown sick contacts.  Pt reports that early April he noted that he was experiencing fevers, chills, diffuse myalgias and a sore throat.  He was confirmed COVID positive around 4/4.  Pt was then again seen in the ED on 4/8 for similar symptoms and was discharged home.  He again returned to the ED on 4/15 for SOB he was elv and discharged home.  Again he was seen on 4/25 with c/o left sided chest pain and SOB was worked up and discharged home.  He reports that during this time it was noted that his blood pressures were elevated and so he followed up with his PMD on 5/7 and was started on Metoprolol. Despite this pt with continued HTN and so he had his Metoprolol increased 2 days ago.  Pt reports that he is no longer experiencing fevers, diffuse muscle pain, or sore throat. He still has intermittent chills and notes that several times a day he feels like his hands and feet are very cold bilaterally.  He further goes on to state that when his blood pressure is elevated he does have a mild HA which resolves when his blood pressure corrects.  Today he reports that he awoke around 11 am with chest pressure tightness  despite taking his blood pressure medication when he rechecked his pressure it remained elevated and he became concerned.  He also reported a left sided HA at that time which has since resolved.   Upon arrival he does admit to significant improvement in symptoms but still has mild chest pressure.  Pt currently denies HA, visual changes, N/V/D/C, abd pain, ext numbness.  He does feel like he has some weakness in his bilateral lower ext.  Pain is not pleuritic in nature and is not worsened with exertion.  No increased discomfort upon leaning forward or lying back although pt does report that the pain does appear to be reproducible in nature.  Pt has no known underlying CAD and has not followed with a cardiologist.  On exam pt lying in bed NAD, NCAT, PERRL, EOMI, heart RRR, lungs CTA, abd soft NT/ND.  No TTP to bilateral temporal arteries.  FROM of neck with no meningeal signs.  CN II-XII intact with no focal deficits, no ataxia noted.  At this time BP has significantly improved.  Pt with no TTP to left lateral chest wall on exam no overlying skin changes.  Pt with chest discomfort in the setting of known COVID and recent HTN diagnosis.  Has just had medication adjustment 2 days prior.   Will obtain screening labs, CE, EKG, chest x-ray.  As symptoms began at 11 am only one set of enzymes is required.  Will monitor BP.  Discussed with pt need to follow up with PMD or cardiology for possible medication adjustment. As BP stable at this time will not adjust medications.  Pt with no hypoxia noted and no signs of respiratory distress.

## 2020-05-23 NOTE — CONSULT NOTE ADULT - ASSESSMENT
59 y/o M PMH of COVID, HTN  he presents today after experiencing chest pain in the setting of HTN episode.  Prior to his diagnosis of COVID he had no pertinent medical history since his D/C he has been unable to follow up with a cardiologist d/t the COVID pandemic.  Cardiology consulted for CP and HTN 59 y/o M PMH of COVID, HTN  he presents today after experiencing chest pain in the setting of HTN episode.  Prior to his diagnosis of COVID he had no pertinent medical history since his D/C he has been unable to follow up with a cardiologist d/t the COVID pandemic.  Cardiology consulted for CP and HTN    -there is no evidence of acute ischemia.  -Trop negative x1  -ECG unchanged from prior w/o ischemic complaints  -Currently w/o anginal symptoms endorses anginal symptoms w/ periods of elevated SBP       HTN  -on lopressor 50 mg twice daily increased 2 days ago endorses a episode of elevated SBP this AM w/ CP  - would add amlodipine 5mg po daily, first dose now  -From a cardiac standpoint he can be D/C home to followup in our office w/ Dr. Sommer for TTE and stess    -there is no evidence of significant arrhythmia.  -NSR on tele and ECG    -there is no evidence for meaningful  volume overload.  -Appears euvolemic on exam     -Pt is hemodynamically stable  -Monitor and replete lytes, keep K>4 and Mg >2  Thank you for the consult  Will continue to follow  Kirill Stone [facility]  Cardiology   Spectra #[phone]

## 2020-05-23 NOTE — ED PROVIDER NOTE - OBJECTIVE STATEMENT
61 yo male with h/o HTN (recently increased from metoprolol 25 mg BID to 50 mg BID 2 weeks ago by his pmd) presents to the ED c/o chest discomfort, sob and elevated bp since being diagnosed with COVID on April 4th. PAtient was seen in ED at that time, had labs, ekg, cxr - unremarkable, was instructed to follow up with cardiologist but due to COVID pandemic was told the next available appointment is July. Patient checked his bp today which was 169/116 so came to ED. Denies fever, chills, headache, dizziness, visual changes, cough, abd pain, N/V, UE/LE weakness or paresthesias.

## 2020-05-23 NOTE — ED PROVIDER NOTE - PATIENT PORTAL LINK FT
You can access the FollowMyHealth Patient Portal offered by [facility] by registering at the following website: [email or web address]. By joining [employer]’s FollowMyHealth portal, you will also be able to view your health information using other applications (apps) compatible with our system.

## 2020-05-23 NOTE — ED PROVIDER NOTE - CLINICAL SUMMARY MEDICAL DECISION MAKING FREE TEXT BOX
61 yo male with h/o HTN (recently increased from metoprolol 25 mg BID to 50 mg BID 2 weeks ago by his pmd) presents to the ED c/o chest discomfort, sob and elevated bp since being diagnosed with COVID on April 4th. PAtient was seen in ED at that time, had labs, ekg, cxr - unremarkable, was instructed to follow up with cardiologist but due to COVID pandemic was told the next available appointment is July. Patient checked his bp today which was 169/116 so came to ED. A/P: labs, ekg, trop, d-dimer, cxr - all unremarkable. cards evaluated patient, recommend adding amlodipine 5 mg and follow up outpatient.

## 2020-05-23 NOTE — ED PROVIDER NOTE - CARE PLAN
Principal Discharge DX:	Hypertension  Secondary Diagnosis:	Shortness of breath  Secondary Diagnosis:	Chest pain

## 2020-05-23 NOTE — CONSULT NOTE ADULT - SUBJECTIVE AND OBJECTIVE BOX
CHIEF COMPLAINT: Patient is a 60y old  Male who presents with a chief complaint of HTN    HPI:  59 y/o M PMH of COVID, HTN  he presents today after experiencing chest pain in the setting of HTN episode.  Prior to his diagnosis of COVID he had no pertinent medical history since his D/C he has been unable to follow up with a cardiologist d/t the COVID pandemic.  No complaints of , dyspnea, palpitations decreased exercise tolerance, N/V, HA reported. No signs of orthopnea or PND.  He endorses chest pain and Head ache in the setting of elevated BP.    PAST MEDICAL & SURGICAL HISTORY:  Hypertension  No significant past surgical history      SOCIAL HISTORY:  No tobacco, ethanol, or drug abuse.    FAMILY HISTORY:    No family history of acute MI or sudden cardiac death.    MEDICATIONS  (STANDING):    MEDICATIONS  (PRN):      Allergies    No Known Allergies    Intolerances        REVIEW OF SYSTEMS:    CONSTITUTIONAL: No weakness, fevers or chills  EYES/ENT: No visual changes;  No vertigo or throat pain   NECK: No pain or stiffness  RESPIRATORY: No cough, wheezing, hemoptysis; No shortness of breath  CARDIOVASCULAR: No chest pain or palpitations  GASTROINTESTINAL: No abdominal pain. No nausea, vomiting, or hematemesis; No diarrhea or constipation. No melena or hematochezia.  GENITOURINARY: No dysuria, frequency or hematuria  NEUROLOGICAL: No numbness or weakness  SKIN: No itching or rash  All other review of systems is negative unless indicated above    VITAL SIGNS:   Vital Signs Last 24 Hrs  T(C): 36.7 (23 May 2020 13:11), Max: 36.7 (23 May 2020 13:11)  T(F): 98 (23 May 2020 13:11), Max: 98 (23 May 2020 13:11)  HR: 90 (23 May 2020 13:11) (90 - 90)  BP: 160/84 (23 May 2020 13:11) (160/84 - 160/84)  BP(mean): --  RR: 18 (23 May 2020 13:11) (18 - 18)  SpO2: 96% (23 May 2020 13:11) (96% - 96%)    I&O's Summary      On Exam:    Constitutional: NAD, alert and oriented x 3  Lungs:  Non-labored, breath sounds are clear bilaterally, No wheezing, rales or rhonchi  Cardiovascular: RRR.  S1 and S2 positive.  No murmurs, rubs, gallops or clicks  Gastrointestinal: Bowel Sounds present, soft, nontender.   Lymph: No peripheral edema. No cervical lymphadenopathy.  Neurological: Alert, no focal deficits  Skin: No rashes or ulcers   Psych:  Mood & affect appropriate.    LABS: All Labs Reviewed:                        15.2   9.86  )-----------( 199      ( 23 May 2020 14:12 )             42.3     23 May 2020 14:12    135    |  105    |  16     ----------------------------<  103    3.9     |  22     |  0.97     Ca    9.0        23 May 2020 14:12    TPro  7.8    /  Alb  4.2    /  TBili  1.0    /  DBili  x      /  AST  24     /  ALT  30     /  AlkPhos  103    23 May 2020 14:12      CARDIAC MARKERS ( 23 May 2020 14:12 )  <.015 ng/mL / x     / x     / x     / x          Blood Culture:         RADIOLOGY:  < from: Xray Chest 1 View AP/PA (05.23.20 @ 13:49) >  EXAM:  XR CHEST AP OR PA 1V                            PROCEDURE DATE:  05/23/2020          INTERPRETATION:  Clinical information: Chest discomfort, shortness of breath    AP view of the chest from 1346 hours:     COMPARISON:  May 09, 2020    The cardiac, hilar and mediastinal contours are unremarkable. The lungs are clear. The osseous structures demonstrate no acute abnormality.    IMPRESSION:    Clear lungs      GINGER SALGADO M.D.,ATTENDING RADIOLOGIST  This document has been electronically signed. May 23 2020  1:56PM        < end of copied text >    EKG:  < from: 12 Lead ECG (05.09.20 @ 19:02) >  Ventricular Rate 66 BPM    Atrial Rate 66 BPM    P-R Interval 158 ms    QRS Duration 94 ms    Q-T Interval 414 ms    QTC Calculation(Bezet) 434 ms    P Axis 43 degrees    R Axis 12 degrees    T Axis 16 degrees    Diagnosis Line Normal sinus rhythm  Normal ECG  When compared with ECG of 25-APR-2020 20:15,  No significant change was found  Confirmed by Reginaldo JACKSON, Kirill ([id number]) on 5/10/2020 12:30:16 PM    < end of copied text >    TTE: CHIEF COMPLAINT: Patient is a 60y old  Male who presents with a chief complaint of HTN    HPI:  61 y/o M PMH of COVID, HTN  he presents today after experiencing chest pain in the setting of HTN episode.  Prior to his diagnosis of COVID he had no pertinent medical history since his D/C he has been unable to follow up with a cardiologist d/t the COVID pandemic.  No complaints of , dyspnea, palpitations decreased exercise tolerance, N/V, HA reported. No signs of orthopnea or PND.  He endorses chest pain and Head ache in the setting of elevated BP.    PAST MEDICAL & SURGICAL HISTORY:  Hypertension  No significant past surgical history      SOCIAL HISTORY:  No tobacco, ethanol, or drug abuse.    FAMILY HISTORY:    No family history of acute MI or sudden cardiac death.    MEDICATIONS  (STANDING):    MEDICATIONS  (PRN):      Allergies    No Known Allergies    Intolerances        REVIEW OF SYSTEMS:    CONSTITUTIONAL: No weakness, fevers or chills  EYES/ENT: No visual changes;  No vertigo or throat pain   NECK: No pain or stiffness  RESPIRATORY: No cough, wheezing, hemoptysis; No shortness of breath  CARDIOVASCULAR: No palpitations +chest pain   GASTROINTESTINAL: No abdominal pain. No nausea, vomiting, or hematemesis; No diarrhea or constipation. No melena or hematochezia.  GENITOURINARY: No dysuria, frequency or hematuria  NEUROLOGICAL: No numbness or weakness  SKIN: No itching or rash  All other review of systems is negative unless indicated above    VITAL SIGNS:   Vital Signs Last 24 Hrs  T(C): 36.7 (23 May 2020 13:11), Max: 36.7 (23 May 2020 13:11)  T(F): 98 (23 May 2020 13:11), Max: 98 (23 May 2020 13:11)  HR: 90 (23 May 2020 13:11) (90 - 90)  BP: 160/84 (23 May 2020 13:11) (160/84 - 160/84)  BP(mean): --  RR: 18 (23 May 2020 13:11) (18 - 18)  SpO2: 96% (23 May 2020 13:11) (96% - 96%)    I&O's Summary      On Exam:  Tele: SR  Constitutional: NAD, alert and oriented x 3  Lungs:  Non-labored, breath sounds are clear bilaterally, No wheezing, rales or rhonchi  Cardiovascular: RRR.  S1 and S2 positive.  No murmurs, rubs, gallops or clicks  Gastrointestinal: Bowel Sounds present, soft, nontender.   Lymph: No peripheral edema. No cervical lymphadenopathy.  Neurological: Alert, no focal deficits  Skin: No rashes or ulcers   Psych:  Mood & affect appropriate.    LABS: All Labs Reviewed:                        15.2   9.86  )-----------( 199      ( 23 May 2020 14:12 )             42.3     23 May 2020 14:12    135    |  105    |  16     ----------------------------<  103    3.9     |  22     |  0.97     Ca    9.0        23 May 2020 14:12    TPro  7.8    /  Alb  4.2    /  TBili  1.0    /  DBili  x      /  AST  24     /  ALT  30     /  AlkPhos  103    23 May 2020 14:12      CARDIAC MARKERS ( 23 May 2020 14:12 )  <.015 ng/mL / x     / x     / x     / x          Blood Culture:         RADIOLOGY:  < from: Xray Chest 1 View AP/PA (05.23.20 @ 13:49) >  EXAM:  XR CHEST AP OR PA 1V                            PROCEDURE DATE:  05/23/2020          INTERPRETATION:  Clinical information: Chest discomfort, shortness of breath    AP view of the chest from 1346 hours:     COMPARISON:  May 09, 2020    The cardiac, hilar and mediastinal contours are unremarkable. The lungs are clear. The osseous structures demonstrate no acute abnormality.    IMPRESSION:    Clear lungs      GINGER SALGADO M.D.,ATTENDING RADIOLOGIST  This document has been electronically signed. May 23 2020  1:56PM        < end of copied text >    EKG:  < from: 12 Lead ECG (05.09.20 @ 19:02) >  Ventricular Rate 66 BPM    Atrial Rate 66 BPM    P-R Interval 158 ms    QRS Duration 94 ms    Q-T Interval 414 ms    QTC Calculation(Bezet) 434 ms    P Axis 43 degrees    R Axis 12 degrees    T Axis 16 degrees    Diagnosis Line Normal sinus rhythm  Normal ECG  When compared with ECG of 25-APR-2020 20:15,  No significant change was found  Confirmed by Reginaldo JACKSON, Kirill ([id number]) on 5/10/2020 12:30:16 PM    < end of copied text >    TTE:

## 2020-05-23 NOTE — ED PROVIDER NOTE - CARE PROVIDER_API CALL
Freddy Sommer  INTERNAL MEDICINE  [address] [place] [address]  Phone: [phone]  Fax: [phone]  Follow Up Time:

## 2020-05-28 DIAGNOSIS — Z86.39 PERSONAL HISTORY OF OTHER ENDOCRINE, NUTRITIONAL AND METABOLIC DISEASE: ICD-10-CM

## 2020-05-28 DIAGNOSIS — Z86.79 PERSONAL HISTORY OF OTHER DISEASES OF THE CIRCULATORY SYSTEM: ICD-10-CM

## 2020-05-28 RX ORDER — METOPROLOL TARTRATE 25 MG/1
25 TABLET, FILM COATED ORAL DAILY
Refills: 0 | Status: DISCONTINUED | COMMUNITY
End: 2020-05-28

## 2020-05-29 ENCOUNTER — NON-APPOINTMENT (OUTPATIENT)
Age: 60
End: 2020-05-29

## 2020-05-29 ENCOUNTER — APPOINTMENT (OUTPATIENT)
Dept: CARDIOLOGY | Facility: CLINIC | Age: 60
End: 2020-05-29
Payer: COMMERCIAL

## 2020-05-29 VITALS
DIASTOLIC BLOOD PRESSURE: 79 MMHG | HEIGHT: 65 IN | HEART RATE: 62 BPM | OXYGEN SATURATION: 97 % | WEIGHT: 175 LBS | BODY MASS INDEX: 29.16 KG/M2 | SYSTOLIC BLOOD PRESSURE: 130 MMHG

## 2020-05-29 PROCEDURE — 93000 ELECTROCARDIOGRAM COMPLETE: CPT

## 2020-05-29 PROCEDURE — 99214 OFFICE O/P EST MOD 30 MIN: CPT

## 2020-05-30 ENCOUNTER — EMERGENCY (EMERGENCY)
Facility: HOSPITAL | Age: 60
LOS: 1 days | Discharge: HOME | End: 2020-05-30
Attending: EMERGENCY MEDICINE | Admitting: EMERGENCY MEDICINE
Payer: COMMERCIAL

## 2020-05-30 VITALS
RESPIRATION RATE: 17 BRPM | HEART RATE: 92 BPM | TEMPERATURE: 98 F | OXYGEN SATURATION: 97 % | DIASTOLIC BLOOD PRESSURE: 79 MMHG | SYSTOLIC BLOOD PRESSURE: 138 MMHG

## 2020-05-30 VITALS
TEMPERATURE: 99 F | SYSTOLIC BLOOD PRESSURE: 147 MMHG | HEIGHT: 65 IN | RESPIRATION RATE: 16 BRPM | HEART RATE: 75 BPM | WEIGHT: 175.05 LBS | DIASTOLIC BLOOD PRESSURE: 89 MMHG

## 2020-05-30 LAB
ALBUMIN SERPL ELPH-MCNC: 4.4 G/DL — SIGNIFICANT CHANGE UP (ref 3.3–5)
ALP SERPL-CCNC: 103 U/L — SIGNIFICANT CHANGE UP (ref 40–120)
ALT FLD-CCNC: 30 U/L — SIGNIFICANT CHANGE UP (ref 12–78)
ANION GAP SERPL CALC-SCNC: 7 MMOL/L — SIGNIFICANT CHANGE UP (ref 5–17)
AST SERPL-CCNC: 38 U/L — HIGH (ref 15–37)
BILIRUB SERPL-MCNC: 1.1 MG/DL — SIGNIFICANT CHANGE UP (ref 0.2–1.2)
BUN SERPL-MCNC: 11 MG/DL — SIGNIFICANT CHANGE UP (ref 7–23)
CALCIUM SERPL-MCNC: 9.1 MG/DL — SIGNIFICANT CHANGE UP (ref 8.5–10.1)
CHLORIDE SERPL-SCNC: 107 MMOL/L — SIGNIFICANT CHANGE UP (ref 96–108)
CO2 SERPL-SCNC: 23 MMOL/L — SIGNIFICANT CHANGE UP (ref 22–31)
CREAT SERPL-MCNC: 1.1 MG/DL — SIGNIFICANT CHANGE UP (ref 0.5–1.3)
GLUCOSE SERPL-MCNC: 112 MG/DL — HIGH (ref 70–99)
HCT VFR BLD CALC: 41.9 % — SIGNIFICANT CHANGE UP (ref 39–50)
HGB BLD-MCNC: 15.1 G/DL — SIGNIFICANT CHANGE UP (ref 13–17)
MCHC RBC-ENTMCNC: 33.5 PG — SIGNIFICANT CHANGE UP (ref 27–34)
MCHC RBC-ENTMCNC: 36 GM/DL — SIGNIFICANT CHANGE UP (ref 32–36)
MCV RBC AUTO: 92.9 FL — SIGNIFICANT CHANGE UP (ref 80–100)
NRBC # BLD: 0 /100 WBCS — SIGNIFICANT CHANGE UP (ref 0–0)
PLATELET # BLD AUTO: 188 K/UL — SIGNIFICANT CHANGE UP (ref 150–400)
POTASSIUM SERPL-MCNC: 4.5 MMOL/L — SIGNIFICANT CHANGE UP (ref 3.5–5.3)
POTASSIUM SERPL-SCNC: 4.5 MMOL/L — SIGNIFICANT CHANGE UP (ref 3.5–5.3)
PROT SERPL-MCNC: 8.2 G/DL — SIGNIFICANT CHANGE UP (ref 6–8.3)
RBC # BLD: 4.51 M/UL — SIGNIFICANT CHANGE UP (ref 4.2–5.8)
RBC # FLD: 12.5 % — SIGNIFICANT CHANGE UP (ref 10.3–14.5)
SODIUM SERPL-SCNC: 137 MMOL/L — SIGNIFICANT CHANGE UP (ref 135–145)
TROPONIN I SERPL-MCNC: <.015 NG/ML — SIGNIFICANT CHANGE UP (ref 0.01–0.04)
WBC # BLD: 5.02 K/UL — SIGNIFICANT CHANGE UP (ref 3.8–10.5)
WBC # FLD AUTO: 5.02 K/UL — SIGNIFICANT CHANGE UP (ref 3.8–10.5)

## 2020-05-30 PROCEDURE — 36415 COLL VENOUS BLD VENIPUNCTURE: CPT

## 2020-05-30 PROCEDURE — 99284 EMERGENCY DEPT VISIT MOD MDM: CPT | Mod: 25

## 2020-05-30 PROCEDURE — 93010 ELECTROCARDIOGRAM REPORT: CPT

## 2020-05-30 PROCEDURE — 71045 X-RAY EXAM CHEST 1 VIEW: CPT

## 2020-05-30 PROCEDURE — 85027 COMPLETE CBC AUTOMATED: CPT

## 2020-05-30 PROCEDURE — 93005 ELECTROCARDIOGRAM TRACING: CPT

## 2020-05-30 PROCEDURE — 99284 EMERGENCY DEPT VISIT MOD MDM: CPT

## 2020-05-30 PROCEDURE — 84484 ASSAY OF TROPONIN QUANT: CPT

## 2020-05-30 PROCEDURE — 71045 X-RAY EXAM CHEST 1 VIEW: CPT | Mod: 26

## 2020-05-30 PROCEDURE — 80053 COMPREHEN METABOLIC PANEL: CPT

## 2020-05-30 NOTE — ED PROVIDER NOTE - OBJECTIVE STATEMENT
60 year old male that c/o high blood pressure last night and this am with associated chest pressure. Pt. took an extra amlodipine 5 mg last night. NO other symptoms. Pain has resolved at this time.

## 2020-05-30 NOTE — ED PROVIDER NOTE - CLINICAL SUMMARY MEDICAL DECISION MAKING FREE TEXT BOX
59 y/o with HTN presents with chest discomfort. Physical examination unremarkable and cardiac workup negative. D/w Dr. Cantu d/c home and pt has appt tuesday with cardiology

## 2020-05-30 NOTE — ED ADULT NURSE NOTE - NSIMPLEMENTINTERV_GEN_ALL_ED
Implemented All Universal Safety Interventions:  [place] to call system. Call bell, personal items and telephone within reach. Instruct patient to call for assistance. Room bathroom lighting operational. Non-slip footwear when patient is off stretcher. Physically safe environment: no spills, clutter or unnecessary equipment. Stretcher in lowest position, wheels locked, appropriate side rails in place.

## 2020-05-30 NOTE — ED ADULT NURSE NOTE - OBJECTIVE STATEMENT
Received pt in bed alert and oriented x4.  C/O HTN.   Pt stated he woke up at 3 am and felt blood pressure high.  Pt also complaints of chest pressure and minor headache.  EKG completed.  Ongoing nursing care and safety maintained.

## 2020-05-30 NOTE — ED PROVIDER NOTE - PRIOR HOSPITAL/ED VISITS SUMMARY FREE TEXT FOR MDM OBTAINED AND REVIEWED OLD RECORDS QUESTION
Pt. had similar symptoms and work up one week ago. placed on amlodipine at that time. Saw Dr. Wood cardiology yesterday and is set up for stress test on Tuesday. Pt. had similar symptoms and work up one week ago. Placed on amlodipine at that time. Saw Dr. Wood cardiology yesterday and is set up for stress test on Tuesday.

## 2020-05-30 NOTE — ED PROVIDER NOTE - PATIENT PORTAL LINK FT
You can access the FollowMyHealth Patient Portal offered by [facility] by registering at the following website: [email or web address]. By joining [employer]’s FollowMyHealth portal, you will also be able to view your health information using other applications (apps) compatible with our system.

## 2020-05-30 NOTE — ED PROVIDER NOTE - CARE PROVIDER_API CALL
Freddy Sommer  INTERNAL MEDICINE  [address] [place] [address]  Phone: [phone]  Fax: [phone]  Established Patient  Scheduled Appointment: 06/02/2020

## 2020-05-30 NOTE — ED PROVIDER NOTE - CHPI ED SYMPTOMS NEG
no cough/no dizziness/no fever/no vomiting/no diaphoresis/no chills/no syncope/no shortness of breath/no back pain/no nausea

## 2020-06-02 ENCOUNTER — APPOINTMENT (OUTPATIENT)
Dept: CARDIOLOGY | Facility: CLINIC | Age: 60
End: 2020-06-02
Payer: COMMERCIAL

## 2020-06-02 PROCEDURE — 93015 CV STRESS TEST SUPVJ I&R: CPT

## 2020-06-05 ENCOUNTER — APPOINTMENT (OUTPATIENT)
Dept: CARDIOLOGY | Facility: CLINIC | Age: 60
End: 2020-06-05

## 2020-06-05 ENCOUNTER — EMERGENCY (EMERGENCY)
Facility: HOSPITAL | Age: 60
LOS: 1 days | End: 2020-06-05
Attending: EMERGENCY MEDICINE
Payer: COMMERCIAL

## 2020-06-05 VITALS
DIASTOLIC BLOOD PRESSURE: 72 MMHG | TEMPERATURE: 99 F | RESPIRATION RATE: 16 BRPM | OXYGEN SATURATION: 97 % | HEART RATE: 63 BPM | SYSTOLIC BLOOD PRESSURE: 112 MMHG

## 2020-06-05 VITALS
TEMPERATURE: 98 F | RESPIRATION RATE: 16 BRPM | HEIGHT: 65 IN | DIASTOLIC BLOOD PRESSURE: 87 MMHG | HEART RATE: 73 BPM | OXYGEN SATURATION: 97 % | SYSTOLIC BLOOD PRESSURE: 137 MMHG | WEIGHT: 190.04 LBS

## 2020-06-05 LAB
ANION GAP SERPL CALC-SCNC: 8 MMOL/L — SIGNIFICANT CHANGE UP (ref 5–17)
BASOPHILS # BLD AUTO: 0.04 K/UL — SIGNIFICANT CHANGE UP (ref 0–0.2)
BASOPHILS NFR BLD AUTO: 0.7 % — SIGNIFICANT CHANGE UP (ref 0–2)
BUN SERPL-MCNC: 18 MG/DL — SIGNIFICANT CHANGE UP (ref 7–23)
CALCIUM SERPL-MCNC: 9.2 MG/DL — SIGNIFICANT CHANGE UP (ref 8.5–10.1)
CHLORIDE SERPL-SCNC: 108 MMOL/L — SIGNIFICANT CHANGE UP (ref 96–108)
CO2 SERPL-SCNC: 24 MMOL/L — SIGNIFICANT CHANGE UP (ref 22–31)
CREAT SERPL-MCNC: 1.1 MG/DL — SIGNIFICANT CHANGE UP (ref 0.5–1.3)
EOSINOPHIL # BLD AUTO: 0.14 K/UL — SIGNIFICANT CHANGE UP (ref 0–0.5)
EOSINOPHIL NFR BLD AUTO: 2.4 % — SIGNIFICANT CHANGE UP (ref 0–6)
ERYTHROCYTE [SEDIMENTATION RATE] IN BLOOD: 9 MM/HR — SIGNIFICANT CHANGE UP (ref 0–20)
GLUCOSE SERPL-MCNC: 106 MG/DL — HIGH (ref 70–99)
HCT VFR BLD CALC: 39.2 % — SIGNIFICANT CHANGE UP (ref 39–50)
HGB BLD-MCNC: 14.3 G/DL — SIGNIFICANT CHANGE UP (ref 13–17)
IMM GRANULOCYTES NFR BLD AUTO: 0.2 % — SIGNIFICANT CHANGE UP (ref 0–1.5)
LYMPHOCYTES # BLD AUTO: 2.01 K/UL — SIGNIFICANT CHANGE UP (ref 1–3.3)
LYMPHOCYTES # BLD AUTO: 34.3 % — SIGNIFICANT CHANGE UP (ref 13–44)
MCHC RBC-ENTMCNC: 33.9 PG — SIGNIFICANT CHANGE UP (ref 27–34)
MCHC RBC-ENTMCNC: 36.5 GM/DL — HIGH (ref 32–36)
MCV RBC AUTO: 92.9 FL — SIGNIFICANT CHANGE UP (ref 80–100)
MONOCYTES # BLD AUTO: 0.41 K/UL — SIGNIFICANT CHANGE UP (ref 0–0.9)
MONOCYTES NFR BLD AUTO: 7 % — SIGNIFICANT CHANGE UP (ref 2–14)
NEUTROPHILS # BLD AUTO: 3.25 K/UL — SIGNIFICANT CHANGE UP (ref 1.8–7.4)
NEUTROPHILS NFR BLD AUTO: 55.4 % — SIGNIFICANT CHANGE UP (ref 43–77)
NRBC # BLD: 0 /100 WBCS — SIGNIFICANT CHANGE UP (ref 0–0)
PLATELET # BLD AUTO: 196 K/UL — SIGNIFICANT CHANGE UP (ref 150–400)
POTASSIUM SERPL-MCNC: 3.6 MMOL/L — SIGNIFICANT CHANGE UP (ref 3.5–5.3)
POTASSIUM SERPL-SCNC: 3.6 MMOL/L — SIGNIFICANT CHANGE UP (ref 3.5–5.3)
RBC # BLD: 4.22 M/UL — SIGNIFICANT CHANGE UP (ref 4.2–5.8)
RBC # FLD: 12.7 % — SIGNIFICANT CHANGE UP (ref 10.3–14.5)
SODIUM SERPL-SCNC: 140 MMOL/L — SIGNIFICANT CHANGE UP (ref 135–145)
WBC # BLD: 5.86 K/UL — SIGNIFICANT CHANGE UP (ref 3.8–10.5)
WBC # FLD AUTO: 5.86 K/UL — SIGNIFICANT CHANGE UP (ref 3.8–10.5)

## 2020-06-05 PROCEDURE — 85652 RBC SED RATE AUTOMATED: CPT

## 2020-06-05 PROCEDURE — 36415 COLL VENOUS BLD VENIPUNCTURE: CPT

## 2020-06-05 PROCEDURE — 80048 BASIC METABOLIC PNL TOTAL CA: CPT

## 2020-06-05 PROCEDURE — 70450 CT HEAD/BRAIN W/O DYE: CPT | Mod: 26

## 2020-06-05 PROCEDURE — 85027 COMPLETE CBC AUTOMATED: CPT

## 2020-06-05 PROCEDURE — 99284 EMERGENCY DEPT VISIT MOD MDM: CPT | Mod: 25

## 2020-06-05 PROCEDURE — 70450 CT HEAD/BRAIN W/O DYE: CPT

## 2020-06-05 PROCEDURE — 99285 EMERGENCY DEPT VISIT HI MDM: CPT

## 2020-06-05 PROCEDURE — 93005 ELECTROCARDIOGRAM TRACING: CPT

## 2020-06-05 PROCEDURE — 93010 ELECTROCARDIOGRAM REPORT: CPT

## 2020-06-05 NOTE — ED PROVIDER NOTE - PROGRESS NOTE DETAILS
S.O.  clinical Dx TMJ, awaiting ESR to r/o T.A in neg then Rx NSAID  results ESR 9.0 will DC on NSAID

## 2020-06-05 NOTE — ED PROVIDER NOTE - ENMT, MLM
Airway patent, Nasal mucosa clear. Mouth with normal mucosa. Throat has no vesicles, no oropharyngeal exudates and uvula is midline. no sinus pain no temporal pain or bruits. pt has pain on deep palp over the tmj bilaterally with left sided crepitance at the tmj consistent with arthritis of this joint, no visible dental carries or oral lesions

## 2020-06-05 NOTE — ED PROVIDER NOTE - OBJECTIVE STATEMENT
pt is a 61 yo male whose cardiologist is dr Sommer he began treatement for hypertension 1 month ago has been doing well on this.  He was seen here 1 week ago for chest pain and cleared. he is sp hernia repair never smoker not a drinker no drugs  presents today with pain in head first on r side as he was asleep 2 nights ago then left side last adal also while asleep. the pain in his head is only when his head is down on pillow no trauma no fever no chills no neuro nor cardiac sx no vision changes concerned for the headache he came to er for eval

## 2020-06-05 NOTE — ED PROVIDER NOTE - CARE PLAN
Principal Discharge DX:	Headache  Secondary Diagnosis:	TMJ (temporomandibular joint disorder)  Secondary Diagnosis:	Hypertension

## 2020-06-05 NOTE — ED PROVIDER NOTE - PATIENT PORTAL LINK FT
You can access the FollowMyHealth Patient Portal offered by [facility] by registering at the following website: [email or web address]. By joining [employer]’s FollowMyHealth portal, you will also be able to view your health information using other applications (apps) compatible with our system.

## 2020-06-05 NOTE — ED PROVIDER NOTE - NSFOLLOWUPINSTRUCTIONS_ED_ALL_ED_FT
How can I relieve TMJ pain?  If you have recently experienced TMJ pain and/or dysfunction, you may find relief with some or all of the following therapies.  Moist Heat. ...  Ice. ...  Soft Diet. ...  Over the-Counter Analgesics. ...  Jaw Exercises. ...  Relaxation Techniques. ...  Side Sleeping. ...  Relax Facial Muscles.    Take Motrin for pain, chew soft foods   f/u with the ENT referral

## 2020-06-05 NOTE — ED PROVIDER NOTE - CLINICAL SUMMARY MEDICAL DECISION MAKING FREE TEXT BOX
tmj pain worse with laying head on pillow ro temporal arteritis in pt with cad ct head ro ich or path nsaids,

## 2020-06-05 NOTE — ED PROVIDER NOTE - CHPI ED SYMPTOMS NEG
no loss of consciousness/no weakness/no change in level of consciousness/no fever/no vomiting/no confusion/no blurred vision/no nausea/no numbness/no dizziness

## 2020-06-05 NOTE — ED ADULT NURSE NOTE - OBJECTIVE STATEMENT
Patient with history of HTN presents ambulatory to the unit from triage with c/o Patient with history of HTN presents ambulatory to the unit from triage with c/o intermittent sharp headaches x 2 weeks.  Started with L sided pain, tonight it was R sided pain that radiates to posterior of head with associated dizziness.  Patient states pain is worse at night; has not been taking any meds for pain.  +nasal congestion.  No fevers, chills, n/v/d.  No changes in vision, was last seen here in ER on 5/30 for HTN; home med amlodipine was increased from 5mg to 10mg daily.  Neuro is grossly intact.  Patient currently has no pain.

## 2020-06-05 NOTE — ED PROVIDER NOTE - CARE PROVIDER_API CALL
Navdeep Trivedi  OTOLARYNGOLOGY  [address] [place] [id number]  Phone: [phone]  Fax: [phone]  Follow Up Time: 1-3 Days

## 2020-06-05 NOTE — ED ADULT NURSE NOTE - CINV DISCH MEDS REVIEWED YN
Yes/patient advised to check with PMD and pharmacist regarding OTC medications taken with antihypertensive medication

## 2020-07-08 ENCOUNTER — APPOINTMENT (OUTPATIENT)
Dept: CARDIOLOGY | Facility: CLINIC | Age: 60
End: 2020-07-08

## 2020-08-03 RX ORDER — OLMESARTAN MEDOXOMIL 5 MG/1
5 TABLET, FILM COATED ORAL DAILY
Qty: 30 | Refills: 2 | Status: DISCONTINUED | COMMUNITY
Start: 2020-06-30 | End: 2020-08-03

## 2020-08-03 RX ORDER — AMLODIPINE BESYLATE 10 MG/1
10 TABLET ORAL DAILY
Qty: 90 | Refills: 3 | Status: DISCONTINUED | COMMUNITY
Start: 1900-01-01 | End: 2020-08-03

## 2020-08-22 ENCOUNTER — INPATIENT (INPATIENT)
Facility: HOSPITAL | Age: 60
LOS: 2 days | Discharge: HOME | DRG: 392 | End: 2020-08-25
Attending: STUDENT IN AN ORGANIZED HEALTH CARE EDUCATION/TRAINING PROGRAM | Admitting: STUDENT IN AN ORGANIZED HEALTH CARE EDUCATION/TRAINING PROGRAM
Payer: COMMERCIAL

## 2020-08-22 VITALS
DIASTOLIC BLOOD PRESSURE: 100 MMHG | OXYGEN SATURATION: 98 % | HEIGHT: 65 IN | RESPIRATION RATE: 18 BRPM | SYSTOLIC BLOOD PRESSURE: 169 MMHG | HEART RATE: 82 BPM | WEIGHT: 175.05 LBS | TEMPERATURE: 98 F

## 2020-08-22 LAB
HCT VFR BLD CALC: 40.6 % — SIGNIFICANT CHANGE UP (ref 39–50)
HGB BLD-MCNC: 14.5 G/DL — SIGNIFICANT CHANGE UP (ref 13–17)
MCHC RBC-ENTMCNC: 33.8 PG — SIGNIFICANT CHANGE UP (ref 27–34)
MCHC RBC-ENTMCNC: 35.7 GM/DL — SIGNIFICANT CHANGE UP (ref 32–36)
MCV RBC AUTO: 94.6 FL — SIGNIFICANT CHANGE UP (ref 80–100)
NRBC # BLD: 0 /100 WBCS — SIGNIFICANT CHANGE UP (ref 0–0)
PLATELET # BLD AUTO: 193 K/UL — SIGNIFICANT CHANGE UP (ref 150–400)
RBC # BLD: 4.29 M/UL — SIGNIFICANT CHANGE UP (ref 4.2–5.8)
RBC # FLD: 11.8 % — SIGNIFICANT CHANGE UP (ref 10.3–14.5)
WBC # BLD: 5.38 K/UL — SIGNIFICANT CHANGE UP (ref 3.8–10.5)
WBC # FLD AUTO: 5.38 K/UL — SIGNIFICANT CHANGE UP (ref 3.8–10.5)

## 2020-08-22 PROCEDURE — 71045 X-RAY EXAM CHEST 1 VIEW: CPT | Mod: 26

## 2020-08-22 PROCEDURE — 99285 EMERGENCY DEPT VISIT HI MDM: CPT

## 2020-08-22 RX ORDER — PANTOPRAZOLE SODIUM 20 MG/1
40 TABLET, DELAYED RELEASE ORAL ONCE
Refills: 0 | Status: COMPLETED | OUTPATIENT
Start: 2020-08-22 | End: 2020-08-22

## 2020-08-22 RX ORDER — SODIUM CHLORIDE 9 MG/ML
1000 INJECTION INTRAMUSCULAR; INTRAVENOUS; SUBCUTANEOUS ONCE
Refills: 0 | Status: COMPLETED | OUTPATIENT
Start: 2020-08-22 | End: 2020-08-22

## 2020-08-22 RX ORDER — LABETALOL HCL 100 MG
10 TABLET ORAL ONCE
Refills: 0 | Status: COMPLETED | OUTPATIENT
Start: 2020-08-22 | End: 2020-08-22

## 2020-08-22 NOTE — ED ADULT NURSE NOTE - OBJECTIVE STATEMENT
60 yr old male presents to the ED with c/o CP, HTN, and epigastric pain. A+O x 4. Wallis[place] Speaking. Family member at the bedside. Pt reports pain x a few hours. On BP medication daily. Pt also reports bilateral hand numbness. Denies fall/trauma, dizziness, known sick contacts, palpitations, NVD, changes in urinary/ bowel patterns. NSR on ekg and monitor. Hypertensive on admission. S1/S2. Lungs clear jaiden. Resp even and unlabored on room air. + BS in all quadrants. Abdomen soft, non distended, and nontender. skin warm dry and intact. will continue to monitor.

## 2020-08-22 NOTE — ED ADULT NURSE NOTE - CHPI ED NUR SYMPTOMS NEG
no syncope/no congestion/no diaphoresis/no dizziness/no vomiting/no chills/no fever/no back pain/no shortness of breath/no nausea

## 2020-08-22 NOTE — ED PROVIDER NOTE - CLINICAL SUMMARY MEDICAL DECISION MAKING FREE TEXT BOX
chest discomfort, dxd h pylori, will obtain labs, imaging, give iv meds chest discomfort, dxd h pylori, r/o ACS, r/o PE ,  treat BP , PPI for epigastric pain , CT for abdominal pain, Cardiology consulted. Borderline D-dimer R/O PE and start full dose Lovenox.

## 2020-08-22 NOTE — ED PROVIDER NOTE - ATTENDING CONTRIBUTION TO CARE
60 y male states tonight when going to bed he felt epigastric pain radiate into his chest, denies sob, nvd, fever, cough, states he was diagnosed with h pylori, and has been having these symptoms intermittently for 1 week, is presently on the medications, pantoprazole, carafate, states he takes verapamil.  nonsmoker, denies etoh. PMH:  htn,  PMD Dr Johnson  VSS heent nl neck sup heart rrr s1s2 lungs clear abdomen soft mild epigastric tenderness, no guarding , no rebound no CVAT ext normal neuro non focal  EKG and first troponin normal  Dx Atypical CP, abdominal pain  plan R/O ACS  IV PPI and CT scan of the abdomen Dr Kurtz cardiology was consulted 60 y male states tonight when going to bed he felt epigastric pain radiate into his chest, denies sob, nvd, fever, cough, states he was diagnosed with h pylori, and has been having these symptoms intermittently for 1 week, is presently on the medications, pantoprazole, carafate, states he takes verapamil.  nonsmoker, denies etoh. PMH:  htn,  PMD Dr Johnson In triage he stated that he is experiencing  Chest pain/dizziness/bilateral numbness to upper extremities for 1 day and found to have HTN urgency    VSS heent nl neck sup heart rrr s1s2 lungs clear abdomen soft mild epigastric tenderness, no guarding , no rebound no CVAT ext normal neuro non focal  EKG and first troponin normal  Dx Atypical CP, abdominal pain  plan R/O ACS  IV PPI and CT scan of the abdomen Dr Kurtz cardiology was consulted

## 2020-08-22 NOTE — ED PROVIDER NOTE - OBJECTIVE STATEMENT
Chest pain/dizziness/bilateral numbness to upper extremities for 1 day.  chest pain Chest pain/dizziness/bilateral numbness to upper extremities for 1 day.  chest pain    60 y male states tonight when going to bed he felt epigastric pain radiate into his chest, denies sob, nvd, fever, cough, states he was diagnosed with h pylori, and has been having these symptoms intermittently for 1 week, is presently on the medications, pantoprazole, carafate, states he takes verapamil.  nonsmoker, denies etoh. PMH:  htn,  PMD Dr Johnson  No GI 60 y male states tonight when going to bed he felt epigastric pain radiate into his chest, denies sob, nvd, fever, cough, states he was diagnosed with h pylori, and has been having these symptoms intermittently for 1 week, is presently on the medications, pantoprazole, carafate, states he takes verapamil.  nonsmoker, denies etoh. PMH:  htn,  PMD Dr Johnson  No GI

## 2020-08-22 NOTE — ED PROVIDER NOTE - CARE PLAN
Principal Discharge DX:	Chest pain Principal Discharge DX:	Chest pain  Goal:	R/O PE  Secondary Diagnosis:	Abdominal pain  Secondary Diagnosis:	Hypertensive urgency

## 2020-08-23 DIAGNOSIS — R09.89 OTHER SPECIFIED SYMPTOMS AND SIGNS INVOLVING THE CIRCULATORY AND RESPIRATORY SYSTEMS: ICD-10-CM

## 2020-08-23 DIAGNOSIS — K29.70 GASTRITIS, UNSPECIFIED, WITHOUT BLEEDING: ICD-10-CM

## 2020-08-23 DIAGNOSIS — Z98.890 OTHER SPECIFIED POSTPROCEDURAL STATES: Chronic | ICD-10-CM

## 2020-08-23 DIAGNOSIS — R10.13 EPIGASTRIC PAIN: ICD-10-CM

## 2020-08-23 DIAGNOSIS — R91.1 SOLITARY PULMONARY NODULE: ICD-10-CM

## 2020-08-23 DIAGNOSIS — I16.0 HYPERTENSIVE URGENCY: ICD-10-CM

## 2020-08-23 DIAGNOSIS — R07.9 CHEST PAIN, UNSPECIFIED: ICD-10-CM

## 2020-08-23 DIAGNOSIS — R07.89 OTHER CHEST PAIN: ICD-10-CM

## 2020-08-23 LAB
ALBUMIN SERPL ELPH-MCNC: 4.2 G/DL — SIGNIFICANT CHANGE UP (ref 3.3–5)
ALP SERPL-CCNC: 111 U/L — SIGNIFICANT CHANGE UP (ref 40–120)
ALT FLD-CCNC: 25 U/L — SIGNIFICANT CHANGE UP (ref 12–78)
ANION GAP SERPL CALC-SCNC: 6 MMOL/L — SIGNIFICANT CHANGE UP (ref 5–17)
APTT BLD: 25 SEC — LOW (ref 27.5–35.5)
AST SERPL-CCNC: 19 U/L — SIGNIFICANT CHANGE UP (ref 15–37)
BILIRUB SERPL-MCNC: 1.3 MG/DL — HIGH (ref 0.2–1.2)
BUN SERPL-MCNC: 12 MG/DL — SIGNIFICANT CHANGE UP (ref 7–23)
CALCIUM SERPL-MCNC: 9.1 MG/DL — SIGNIFICANT CHANGE UP (ref 8.5–10.1)
CHLORIDE SERPL-SCNC: 108 MMOL/L — SIGNIFICANT CHANGE UP (ref 96–108)
CHOLEST SERPL-MCNC: 137 MG/DL — SIGNIFICANT CHANGE UP (ref 10–199)
CO2 SERPL-SCNC: 24 MMOL/L — SIGNIFICANT CHANGE UP (ref 22–31)
CREAT SERPL-MCNC: 1 MG/DL — SIGNIFICANT CHANGE UP (ref 0.5–1.3)
D DIMER BLD IA.RAPID-MCNC: 373 NG/ML DDU — HIGH
GLUCOSE SERPL-MCNC: 109 MG/DL — HIGH (ref 70–99)
HDLC SERPL-MCNC: 44 MG/DL — SIGNIFICANT CHANGE UP
INR BLD: 1.07 RATIO — SIGNIFICANT CHANGE UP (ref 0.88–1.16)
LIDOCAIN IGE QN: 103 U/L — SIGNIFICANT CHANGE UP (ref 73–393)
LIPID PNL WITH DIRECT LDL SERPL: 75 MG/DL — SIGNIFICANT CHANGE UP
MAGNESIUM SERPL-MCNC: 1.7 MG/DL — SIGNIFICANT CHANGE UP (ref 1.6–2.6)
POTASSIUM SERPL-MCNC: 3.8 MMOL/L — SIGNIFICANT CHANGE UP (ref 3.5–5.3)
POTASSIUM SERPL-SCNC: 3.8 MMOL/L — SIGNIFICANT CHANGE UP (ref 3.5–5.3)
PROT SERPL-MCNC: 7.5 G/DL — SIGNIFICANT CHANGE UP (ref 6–8.3)
PROTHROM AB SERPL-ACNC: 12.5 SEC — SIGNIFICANT CHANGE UP (ref 10.6–13.6)
SARS-COV-2 IGG SERPL QL IA: POSITIVE
SARS-COV-2 IGM SERPL IA-ACNC: 127 INDEX — HIGH
SARS-COV-2 RNA SPEC QL NAA+PROBE: SIGNIFICANT CHANGE UP
SODIUM SERPL-SCNC: 138 MMOL/L — SIGNIFICANT CHANGE UP (ref 135–145)
TOTAL CHOLESTEROL/HDL RATIO MEASUREMENT: 3.1 RATIO — LOW (ref 3.4–9.6)
TRIGL SERPL-MCNC: 89 MG/DL — SIGNIFICANT CHANGE UP (ref 10–149)
TROPONIN I SERPL-MCNC: <.015 NG/ML — SIGNIFICANT CHANGE UP (ref 0.01–0.04)
TSH SERPL-MCNC: 1.58 UIU/ML — SIGNIFICANT CHANGE UP (ref 0.36–3.74)

## 2020-08-23 PROCEDURE — 93010 ELECTROCARDIOGRAM REPORT: CPT

## 2020-08-23 PROCEDURE — 74177 CT ABD & PELVIS W/CONTRAST: CPT | Mod: 26

## 2020-08-23 PROCEDURE — 93970 EXTREMITY STUDY: CPT | Mod: 26

## 2020-08-23 PROCEDURE — 70450 CT HEAD/BRAIN W/O DYE: CPT | Mod: 26

## 2020-08-23 PROCEDURE — 99223 1ST HOSP IP/OBS HIGH 75: CPT

## 2020-08-23 RX ORDER — SUCRALFATE 1 G
1 TABLET ORAL
Refills: 0 | Status: DISCONTINUED | OUTPATIENT
Start: 2020-08-23 | End: 2020-08-25

## 2020-08-23 RX ORDER — VERAPAMIL HCL 240 MG
1 CAPSULE, EXTENDED RELEASE PELLETS 24 HR ORAL
Qty: 0 | Refills: 0 | DISCHARGE

## 2020-08-23 RX ORDER — METOPROLOL TARTRATE 50 MG
1 TABLET ORAL
Qty: 0 | Refills: 0 | DISCHARGE

## 2020-08-23 RX ORDER — ASPIRIN/CALCIUM CARB/MAGNESIUM 324 MG
325 TABLET ORAL ONCE
Refills: 0 | Status: COMPLETED | OUTPATIENT
Start: 2020-08-23 | End: 2020-08-23

## 2020-08-23 RX ORDER — METOPROLOL TARTRATE 50 MG
25 TABLET ORAL DAILY
Refills: 0 | Status: DISCONTINUED | OUTPATIENT
Start: 2020-08-23 | End: 2020-08-25

## 2020-08-23 RX ORDER — PANTOPRAZOLE SODIUM 20 MG/1
40 TABLET, DELAYED RELEASE ORAL
Refills: 0 | Status: DISCONTINUED | OUTPATIENT
Start: 2020-08-24 | End: 2020-08-25

## 2020-08-23 RX ORDER — ENOXAPARIN SODIUM 100 MG/ML
80 INJECTION SUBCUTANEOUS ONCE
Refills: 0 | Status: COMPLETED | OUTPATIENT
Start: 2020-08-23 | End: 2020-08-23

## 2020-08-23 RX ORDER — ASPIRIN/CALCIUM CARB/MAGNESIUM 324 MG
81 TABLET ORAL DAILY
Refills: 0 | Status: DISCONTINUED | OUTPATIENT
Start: 2020-08-24 | End: 2020-08-25

## 2020-08-23 RX ORDER — ENOXAPARIN SODIUM 100 MG/ML
40 INJECTION SUBCUTANEOUS DAILY
Refills: 0 | Status: DISCONTINUED | OUTPATIENT
Start: 2020-08-24 | End: 2020-08-25

## 2020-08-23 RX ORDER — SUCRALFATE 1 G
10 TABLET ORAL
Qty: 0 | Refills: 0 | DISCHARGE

## 2020-08-23 RX ORDER — AMLODIPINE BESYLATE 2.5 MG/1
10 TABLET ORAL DAILY
Refills: 0 | Status: DISCONTINUED | OUTPATIENT
Start: 2020-08-23 | End: 2020-08-25

## 2020-08-23 RX ADMIN — PANTOPRAZOLE SODIUM 40 MILLIGRAM(S): 20 TABLET, DELAYED RELEASE ORAL at 00:02

## 2020-08-23 RX ADMIN — Medication 1 GRAM(S): at 22:23

## 2020-08-23 RX ADMIN — Medication 325 MILLIGRAM(S): at 00:47

## 2020-08-23 RX ADMIN — Medication 25 MILLIGRAM(S): at 05:37

## 2020-08-23 RX ADMIN — Medication 10 MILLIGRAM(S): at 00:02

## 2020-08-23 RX ADMIN — ENOXAPARIN SODIUM 80 MILLIGRAM(S): 100 INJECTION SUBCUTANEOUS at 08:42

## 2020-08-23 RX ADMIN — SODIUM CHLORIDE 1000 MILLILITER(S): 9 INJECTION INTRAMUSCULAR; INTRAVENOUS; SUBCUTANEOUS at 00:02

## 2020-08-23 RX ADMIN — AMLODIPINE BESYLATE 10 MILLIGRAM(S): 2.5 TABLET ORAL at 05:37

## 2020-08-23 RX ADMIN — SODIUM CHLORIDE 1000 MILLILITER(S): 9 INJECTION INTRAMUSCULAR; INTRAVENOUS; SUBCUTANEOUS at 01:02

## 2020-08-23 RX ADMIN — Medication 1 GRAM(S): at 17:16

## 2020-08-23 NOTE — H&P ADULT - ASSESSMENT
59 yo male with past medical history of hypertension and H.plyori who presents with complains of intermittent  epigastric pain radiating into his chest for about 1 month and a half found to have hypertensive urgency. Being admitted for chest pain with concern for possible MI vs PE given his chest pain. However, given normal ECG and neg cardiac enzymes, also consider gastritis given history of h pylori

## 2020-08-23 NOTE — H&P ADULT - NSHPLABSRESULTS_GEN_ALL_CORE
LABS:                        14.5   5.38  )-----------( 193      ( 22 Aug 2020 23:45 )             40.6     08-22    138  |  108  |  12  ----------------------------<  109<H>  3.8   |  24  |  1.00    Ca    9.1      22 Aug 2020 23:45    TPro  7.5  /  Alb  4.2  /  TBili  1.3<H>  /  DBili  x   /  AST  19  /  ALT  25  /  AlkPhos  111  08-22    CARDIAC MARKERS ( 22 Aug 2020 23:45 )  <.015 ng/mL / x     / x     / x     / x          PT/INR - ( 22 Aug 2020 23:45 )   PT: 12.5 sec;   INR: 1.07 ratio         PTT - ( 22 Aug 2020 23:45 )  PTT:25.0 sec    BNPSerum Pro-Brain Natriuretic Peptide: 17 pg/mL (08-22 @ 23:45)

## 2020-08-23 NOTE — CONSULT NOTE ADULT - PROBLEM SELECTOR RECOMMENDATION 9
- 2/2 PUD vs gastritis?  - cardiac workup thus far negative   - elevated d-dimer, r/o PE  - follow up TTE and CTA chest  - no prior history of endoscopic workup   - cont ppi, start carafate 1g QID  - diet as tolerated  - d/w GI attending, will tentatively plan for upper endoscopy Tuesday, 08/25  - d/w patient who is agreeable

## 2020-08-23 NOTE — H&P ADULT - PROBLEM SELECTOR PLAN 1
- patient denies chest pain sxs currently or SOB  -Troponin neg x 2, BNP wnl,  - cont telemetry   - plan for TTE today  - f/u lipid panel, TSH   - plan for stress test as an outpatient   -Seen by cards in the ED, plan for outpatient folow up with Dr. Bullard   - received full AC dose with Lovenox  - given the amount of contrast patient was given in the ED, will order CT CTA for tomorrow,  -will order b/l LE dopplers given concern for PE

## 2020-08-23 NOTE — H&P ADULT - NSHPPHYSICALEXAM_GEN_ALL_CORE
GEN: NAD, calm and alert   HEENT: , pupil reactive EMOI intact, clear OP, no LAD appreciated   Heart: RRR w/o m/r/g S1, S2  Pulmonology: CTA b/l  ABD: +Bs, soft, mildly TTP in the left upper quadrant  Extremities: no edema, 2+ distal pluses   SKIN: warm and dry   Neuro: CN II-XII grossly intact, spontaneous movement in all extremities,5/5 strength in b/l UEs and b/l LEs

## 2020-08-23 NOTE — H&P ADULT - PROBLEM SELECTOR PLAN 5
-currently on Lovenox 80 mg,   -would hold off on further full AC with Lovenox will cont on with ppx dosing of Lovenox

## 2020-08-23 NOTE — H&P ADULT - HISTORY OF PRESENT ILLNESS
61 yo male with past medical history of hypertension and bettye.mei who presents with complains of intermittent  epigastric pain radiating into his chest for about 1 month and a half. Reports worsening epigastric pain with movement and after eating meals and improvement with surcalfate.  Patient reports he was diagnosed with h.mei about the same time his symptoms started and was treat his bettye hurley for 2 weeks by his PCP but has not had follow up testing as of yet. Patient states he came to the ED last night because he was awoken from sleep with pain and a "cold sweat" and a headache . Denies any SOB, dizziness,  new change in chest pain, new radiation to neck or back or arms, denies nausea, new bowel or bladder issues, change in stool, change in weight. Denies any dizziness, lightheadedness, change in vision, palpitations, back pain, difficulty with ambulation     VS in the ED, elevated /100 which improved after receiving Labetalol 10 mg Iv x 1.  Labs notable for neg Troponin x 2, normal BNP,  elevated D-Dimer 373. EKG demonstrated NSR. CT ABD was neg for acute intra-abdominal pathology CT Head was neg.   Was seen by cardiology ( Dr. Kurtz) in the ED, given elevated D-dimer plan to admit for chest pain workup with r/o for MI and possible PE and was started on full dose ASA and Lovenox and amlodipine and metorpolol  while in the ED

## 2020-08-23 NOTE — ED ADULT NURSE REASSESSMENT NOTE - NS ED NURSE REASSESS COMMENT FT1
H & P


Time Seen by Provider: 04/19/18 16:16


HPI/ROS: 





CHIEF COMPLAINT:  Left thigh rash





HISTORY OF PRESENT ILLNESS:  54-year-old immunocompetent female complaining of 

3 days of progressively worsening painful vesicular lesions along the left 

anterior proximal thigh.  No involvement of her genitalia or labia.  No flu-

like symptoms.  No headache.  No back pain.











************


PHYSICAL EXAM





(Prior to examination, patient consented to physical exam, hands were washed 

and my usual and customary physical exam procedures followed)


1) GENERAL: Well-developed, well-nourished, alert and oriented.  Appears to be 

in no acute distress.Examination with female nurse Carrie at bedside.


2) HEAD: Normocephalic


3) HEENT: sclera anicteric   


4) LUNGS: Breathing comfortably.   


5) SKIN:  L1 dermatome left side vesicular lesions following a dermatomal 

distribution.  No involvement of the labia.  Primarily on the anterior aspect.  








Smoking Status: Never smoked


Constitutional: 


 Initial Vital Signs











Temperature (C)  36.7 C   04/19/18 15:28


 


Heart Rate  58 L  04/19/18 15:28


 


Respiratory Rate  17   04/19/18 15:28


 


Blood Pressure  130/79 H  04/19/18 15:28


 


O2 Sat (%)  100   04/19/18 15:28








 











O2 Delivery Mode               Room Air














Allergies/Adverse Reactions: 


 





No Known Allergies Allergy (Unverified 04/19/18 15:28)


 








Home Medications: 














 Medication  Instructions  Recorded


 


Valacyclovir HCl [Valtrex] 1,000 mg PO TID #21 tab 04/19/18














MDM/Departure





- MDM


ED Course/Re-evaluation: 





The patient's symptoms are consistent with uncomplicated zoster of the left L1 

dermatome in this immunocompetent patient.  Plan will be initiation of 

antiviral therapy with Valtrex monotherapy.  Usual customary discharge 

precautions instructions provided.  She does not have a PCP.  I provided her 

referral information.  Care of patient under supervision of secondary 

supervising physician Dr Sahu .





- Depart


Disposition: Home, Routine, Self-Care


Clinical Impression: 


Zoster


Qualifiers:


 Herpes zoster complications: without complications Qualified Code(s): B02.9 - 

Zoster without complications





Condition: Good


Instructions:  Shingles (ED)


Additional Instructions: 


Return to the ER if you develop flu-like symptoms fevers or any new symptoms 

that concern you.


Prescriptions: 


Valacyclovir HCl [Valtrex] 1,000 mg PO TID #21 tab


Referrals: 


Esme Eden MD [Medical Doctor] - 5-7 days, call for appt. oral contrast administered to patient. CT to be done @ 0615. will continue to monitor.

## 2020-08-23 NOTE — H&P ADULT - PROBLEM SELECTOR PLAN 3
- resolved with Labetalol in the ED  - neg trops, EKG : NSR   - CT head negative   - currently on metoprolol 25 mg and amlodipine 10 mg,   - was on verapamil 120 mg BID at home, per cards will continue with current anti-hypertensive meds and hold verapamil   - if BP >160/90, okay to give Labetalol 10 mg IV  - DASH/TLC diet  - f/u cards rec

## 2020-08-23 NOTE — H&P ADULT - PROBLEM SELECTOR PLAN 2
- cont pantoprazole 40 mg daily  - if chest pain work up is neg, there is high clinical suspicion for h pylori infection and recommend GI consultation for evaluation for h pylori infection and outpatient follow up

## 2020-08-23 NOTE — CONSULT NOTE ADULT - SUBJECTIVE AND OBJECTIVE BOX
Chief Complaint:  Patient is a 60y old  Male who presents with a chief complaint of Chest Pain, R/O MI (23 Aug 2020 07:31)      HPI: 61 yo male with past medical history of hypertension and h.mei who presents with complains of intermittent  epigastric pain radiating into his chest for about 1 month and a half. Reports worsening epigastric pain with movement and after eating meals and improvement with surcalfate.  Patient reports he was diagnosed with h.mei about the same time his symptoms started and was treat his h mei for 2 weeks by his PCP with no improvement of symptoms. Patient states he came to the ED last night because he was awoken from sleep with pain and a "cold sweat" and a headache . Denies any SOB, dizziness,  new change in chest pain, new radiation to neck or back or arms, denies nausea, new bowel or bladder issues, change in stool, change in weight. Denies any dizziness, lightheadedness, change in vision, palpitations, back pain, difficulty with ambulation    VS in the ED, elevated /100 which improved after receiving Labetalol 10 mg Iv x 1.  Labs notable for neg Troponin x 2, normal BNP,  elevated D-Dimer 373. EKG demonstrated NSR. CT ABD was neg for acute intra-abdominal pathology CT Head was neg.   Was seen by cardiology ( Dr. Kurtz) in the ED, given elevated D-dimer plan to admit for chest pain workup with r/o for MI and possible PE and was started on full dose ASA and Lovenox and amlodipine and metorpolol  while in the ED    Allergies:  No Known Allergies      Medications:  amLODIPine   Tablet 10 milliGRAM(s) Oral daily  metoprolol succinate ER 25 milliGRAM(s) Oral daily  sucralfate suspension 1 Gram(s) Oral four times a day      PMHX/PSHX:  Helicobacter pylori gastritis  Hypertension  No pertinent past medical history  H/O hernia repair  No significant past surgical history      Family history:  No pertinent family history in first degree relatives      Social History:     ROS:     General:  No wt loss, fevers, chills, night sweats, fatigue,   Eyes:  Good vision, no reported pain  ENT:  No sore throat, pain, runny nose, dysphagia  CV:  No pain, palpitations, hypo/hypertension  Resp:  No dyspnea, cough, tachypnea, wheezing  GI:  +epigastric pain, No nausea, No vomiting, No diarrhea, No constipation, No weight loss, No fever, No pruritis, No rectal bleeding, No tarry stools, No dysphagia,  :  No pain, bleeding, incontinence, nocturia  Muscle:  No pain, weakness  Neuro:  No weakness, tingling, memory problems  Psych:  No fatigue, insomnia, mood problems, depression  Endocrine:  No polyuria, polydipsia, cold/heat intolerance  Heme:  No petechiae, ecchymosis, easy bruisability  Skin:  No rash, tattoos, scars, edema      PHYSICAL EXAM:   Vital Signs:  Vital Signs Last 24 Hrs  T(C): 36.8 (23 Aug 2020 10:21), Max: 36.8 (23 Aug 2020 10:21)  T(F): 98.2 (23 Aug 2020 10:21), Max: 98.2 (23 Aug 2020 10:21)  HR: 58 (23 Aug 2020 10:21) (52 - 82)  BP: 115/70 (23 Aug 2020 10:21) (110/68 - 169/100)  BP(mean): --  RR: 14 (23 Aug 2020 10:21) (14 - 18)  SpO2: 100% (23 Aug 2020 10:21) (98% - 100%)  Daily Height in cm: 165.1 (22 Aug 2020 23:13)    Daily     GENERAL:  no distress  HEENT:  NC/AT  ABDOMEN:  Soft, ttp epigastric region, non-distended, normoactive bowel sounds  EXTEREMITIES:  no cyanosis,clubbing or edema  SKIN:  No rash/erythema/ecchymoses/petechiae/wounds/abscess/warm/dry  NEURO:  Alert, oriented, no asterixis, no tremor, no encephalopathy    LABS:                        14.5   5.38  )-----------( 193      ( 22 Aug 2020 23:45 )             40.6     08-22    138  |  108  |  12  ----------------------------<  109<H>  3.8   |  24  |  1.00    Ca    9.1      22 Aug 2020 23:45  Mg     1.7     08-23    TPro  7.5  /  Alb  4.2  /  TBili  1.3<H>  /  DBili  x   /  AST  19  /  ALT  25  /  AlkPhos  111  08-22    LIVER FUNCTIONS - ( 22 Aug 2020 23:45 )  Alb: 4.2 g/dL / Pro: 7.5 g/dL / ALK PHOS: 111 U/L / ALT: 25 U/L / AST: 19 U/L / GGT: x           PT/INR - ( 22 Aug 2020 23:45 )   PT: 12.5 sec;   INR: 1.07 ratio         PTT - ( 22 Aug 2020 23:45 )  PTT:25.0 sec    Amylase Serum--      Lipase [id number]       Ammonia--      Imaging:    < from: CT Abdomen and Pelvis w/ Oral Cont and w/ IV Cont (08.23.20 @ 06:12) >    EXAM:  CT ABDOMEN AND PELVIS OC IC                            PROCEDURE DATE:  08/23/2020          INTERPRETATION:  CLINICAL INFORMATION:  abdominal pain  COMPARISON: April 13, 2019  PROCEDURE:  CT of the Abdomen and Pelvis was performed with intravenous contrast.  Intravenous contrast:   90 ml Omnipaque 350.  Oral contrast: positive contrast was administered.  Sagittal and coronal reformats were performed.    FINDINGS:  LIMITATIONS: None.    LOWER CHEST: Several pulmonary nodules measuring up to 8 mm in the left lower lobe. This is stable. Others were not included in the field of view previously.  ABDOMINAL WALL: Within normal limits.  VESSELS: Within normal limits.  BOWEL: No evidence of obstruction. Normal distal esophagus, stomach and duodenum. No gross abnormalities of the colon. Appendix normal.    LIVER: Within normal limits.  BILE DUCTS: Normal caliber  GALLBLADDER: Within normal limits.  SPLEEN: Within normal limits.  PANCREAS: Within normal limits.  ADRENALS: Within normal limits.  KIDNEYS/URETERS: Within normal limits.    BLADDER: Within normal limits.  REPRODUCTIVE ORGANS: Within normal limits.    PERITONEUM: No ascites.  RETROPERITONEUM/LYMPH NODES: No lymphadenopathy.    BONES: No acute osseous pathology.    IMPRESSION:  No evidence of acute intra-abdominal pathology. The largest pulmonary nodule appears stable. Additional nodules are less than 6 mm and were not previously included in the field-of-view; if the patient is not at high risk for malignancy, no follow-up is necessary.              ANDRES PRESLEY   This document has been electronically signed. Aug 23 2020  6:16AM                < end of copied text >

## 2020-08-23 NOTE — CONSULT NOTE ADULT - SUBJECTIVE AND OBJECTIVE BOX
[facility]     Patient is a 60y old  Male who presents with a chief complaint of     HPI:      HPI:    60y Male for Cardiology Consult    PAST MEDICAL & SURGICAL HISTORY:  Hypertension  No significant past surgical history      FAMILY HISTORY:      SOCIAL HISTORY:   Alcohol:  Smoking:    Allergies    No Known Allergies    Intolerances        MEDICATIONS  (STANDING):    MEDICATIONS  (PRN):      REVIEW OF SYSTEMS:  CONSTITUTIONAL: No fever, weight loss, chills, shakes, or fat  RESPIRATORY: No cough, wheezing, hemoptysis, or shortness of breath  CARDIOVASCULAR: No chest pain, dyspnea, palpitations, dizziness, syncope, paroxysmal nocturnal dyspnea, orthopnea, or arm or leg swelling  GASTROINTESTINAL: No abdominal  or epigastric pain, nausea, vomiting, hematemesis, diarrhea, constipation, melena or bright red bloo  NEUROLOGICAL: No headaches, memory loss, slurred speech, limb weakness, loss of strength, numbness, or tremors  SKIN: No itching, burning, rashes, or lesions  ENDOCRINE: No heat or cold intolerance, or hair loss  MUSCULOSKELETAL: No joint pain or swelling, muscle, back, or extremity pain  HEME/LYMPH: No easy bruising or bleeding gums  ALLERY AND IMMUNOLOGIC: No hives or rash.    Vital Signs Last 24 Hrs  T(C): 36.7 (23 Aug 2020 02:01), Max: 36.7 (22 Aug 2020 23:13)  T(F): 98 (23 Aug 2020 02:01), Max: 98 (22 Aug 2020 23:13)  HR: 54 (23 Aug 2020 02:01) (54 - 82)  BP: 110/68 (23 Aug 2020 02:01) (110/68 - 169/100)  BP(mean): --  RR: 16 (23 Aug 2020 02:01) (16 - 18)  SpO2: 100% (23 Aug 2020 02:01) (98% - 100%)    PHYSICAL EXAM:  HEAD:  Atraumatic, Normocephalic  EYES: EOMI, PERRLA, conjunctiva and sclera clear  NECK: Supple and normal thyroid.  No JVD or carotid bruit.   HEART: S1, S2 regular , 1/6 soft ejection systolic murmur in mitral area , no thrill and no gallops .  PULMONARY: Bilateral vesicular breathing , few scattered ronchi and few scattered rales are present .  ABDOMEN: Soft nontender and positive bowl sounds   EXTREMITIES:  No clubbing, cyanosis, or pedal  edema  NEUROLOGICAL: AAOX3 , no focal deficit .    LABS:                        14.5   5.38  )-----------( 193      ( 22 Aug 2020 23:45 )             40.6     08-22    138  |  108  |  12  ----------------------------<  109<H>  3.8   |  24  |  1.00    Ca    9.1      22 Aug 2020 23:45    TPro  7.5  /  Alb  4.2  /  TBili  1.3<H>  /  DBili  x   /  AST  19  /  ALT  25  /  AlkPhos  111  08-22    CARDIAC MARKERS ( 22 Aug 2020 23:45 )  <.015 ng/mL / x     / x     / x     / x          PT/INR - ( 22 Aug 2020 23:45 )   PT: 12.5 sec;   INR: 1.07 ratio         PTT - ( 22 Aug 2020 23:45 )  PTT:25.0 sec    BNPSerum Pro-Brain Natriuretic Peptide: 17 pg/mL (08-22 @ 23:45)        EKG:  ECHO:  IMAGING:    Assessment and Plan :     Will continue to follow during hospital course and give further recommendations as needed . Thanks for your referral . if any questions please contact me at office [phone] [phone]) Rancho Kurtz MD [facility]   Cardiology Consult    Patient is a 60y old  Male who presents with a chief complaint of     HPI:      HPI:    60y Male for Cardiology Consult    PAST MEDICAL & SURGICAL HISTORY:  Hypertension  No significant past surgical history      FAMILY HISTORY:      SOCIAL HISTORY:   Alcohol:  Smoking:    Allergies    No Known Allergies    Intolerances        MEDICATIONS  (STANDING):    MEDICATIONS  (PRN):    T(C): 36.7 (23 Aug 2020 02:01), Max: 36.7 (22 Aug 2020 23:13)  T(F): 98 (23 Aug 2020 02:01), Max: 98 (22 Aug 2020 23:13)  HR: 54 (23 Aug 2020 02:01) (54 - 82)  BP: 110/68 (23 Aug 2020 02:01) (110/68 - 169/100)  BP(mean): --  RR: 16 (23 Aug 2020 02:01) (16 - 18)  SpO2: 100% (23 Aug 2020 02:01) (98% - 100%)    LABS:                        14.5   5.38  )-----------( 193      ( 22 Aug 2020 23:45 )             40.6     08-22    138  |  108  |  12  ----------------------------<  109<H>  3.8   |  24  |  1.00    Ca    9.1      22 Aug 2020 23:45    TPro  7.5  /  Alb  4.2  /  TBili  1.3<H>  /  DBili  x   /  AST  19  /  ALT  25  /  AlkPhos  111  08-22    CARDIAC MARKERS ( 22 Aug 2020 23:45 )  <.015 ng/mL / x     / x     / x     / x          PT/INR - ( 22 Aug 2020 23:45 )   PT: 12.5 sec;   INR: 1.07 ratio         PTT - ( 22 Aug 2020 23:45 )  PTT:25.0 sec    BNPSerum Pro-Brain Natriuretic Peptide: 17 pg/mL (08-22 @ 23:45)    Assessment and Plan :   FULL CONSULT DICTATED .  60 years old male sharp chest pain and also has  epigastric and adnominal pain . Will send troponin I at frequent intervals  to R/O MI . Will get CT abdomen done too . Continue Protonix . Cardiac stable so far . Will get echo done . Will continue Protonix 40 mg daily as pout patient . Will get echocardiogram done . Will do stress test as pot patient . Continue present medications and Protonix 40 mg and aspirin 81 mg daily . Cardiology F/U with Dr James 1-2 weeks [phone]  Will continue to follow during hospital course and give further recommendations as needed . Thanks for your referral . if any questions please contact me at office ([phone] [phone])

## 2020-08-24 ENCOUNTER — APPOINTMENT (OUTPATIENT)
Dept: INTERNAL MEDICINE | Facility: CLINIC | Age: 60
End: 2020-08-24

## 2020-08-24 ENCOUNTER — TRANSCRIPTION ENCOUNTER (OUTPATIENT)
Age: 60
End: 2020-08-24

## 2020-08-24 DIAGNOSIS — R29.898 OTHER SYMPTOMS AND SIGNS INVOLVING THE MUSCULOSKELETAL SYSTEM: ICD-10-CM

## 2020-08-24 LAB
ANION GAP SERPL CALC-SCNC: 3 MMOL/L — LOW (ref 5–17)
BUN SERPL-MCNC: 12 MG/DL — SIGNIFICANT CHANGE UP (ref 7–23)
CALCIUM SERPL-MCNC: 9.7 MG/DL — SIGNIFICANT CHANGE UP (ref 8.5–10.1)
CHLORIDE SERPL-SCNC: 109 MMOL/L — HIGH (ref 96–108)
CO2 SERPL-SCNC: 29 MMOL/L — SIGNIFICANT CHANGE UP (ref 22–31)
CREAT SERPL-MCNC: 0.92 MG/DL — SIGNIFICANT CHANGE UP (ref 0.5–1.3)
GLUCOSE SERPL-MCNC: 97 MG/DL — SIGNIFICANT CHANGE UP (ref 70–99)
HCT VFR BLD CALC: 41.3 % — SIGNIFICANT CHANGE UP (ref 39–50)
HCV AB S/CO SERPL IA: 0.07 S/CO — SIGNIFICANT CHANGE UP (ref 0–0.99)
HCV AB SERPL-IMP: SIGNIFICANT CHANGE UP
HGB BLD-MCNC: 14.6 G/DL — SIGNIFICANT CHANGE UP (ref 13–17)
MAGNESIUM SERPL-MCNC: 2.2 MG/DL — SIGNIFICANT CHANGE UP (ref 1.6–2.6)
MCHC RBC-ENTMCNC: 33.8 PG — SIGNIFICANT CHANGE UP (ref 27–34)
MCHC RBC-ENTMCNC: 35.4 GM/DL — SIGNIFICANT CHANGE UP (ref 32–36)
MCV RBC AUTO: 95.6 FL — SIGNIFICANT CHANGE UP (ref 80–100)
NRBC # BLD: 0 /100 WBCS — SIGNIFICANT CHANGE UP (ref 0–0)
PLATELET # BLD AUTO: 200 K/UL — SIGNIFICANT CHANGE UP (ref 150–400)
POTASSIUM SERPL-MCNC: 4.2 MMOL/L — SIGNIFICANT CHANGE UP (ref 3.5–5.3)
POTASSIUM SERPL-SCNC: 4.2 MMOL/L — SIGNIFICANT CHANGE UP (ref 3.5–5.3)
RBC # BLD: 4.32 M/UL — SIGNIFICANT CHANGE UP (ref 4.2–5.8)
RBC # FLD: 11.8 % — SIGNIFICANT CHANGE UP (ref 10.3–14.5)
SODIUM SERPL-SCNC: 141 MMOL/L — SIGNIFICANT CHANGE UP (ref 135–145)
WBC # BLD: 4.49 K/UL — SIGNIFICANT CHANGE UP (ref 3.8–10.5)
WBC # FLD AUTO: 4.49 K/UL — SIGNIFICANT CHANGE UP (ref 3.8–10.5)

## 2020-08-24 PROCEDURE — 71275 CT ANGIOGRAPHY CHEST: CPT | Mod: 26

## 2020-08-24 PROCEDURE — 99233 SBSQ HOSP IP/OBS HIGH 50: CPT

## 2020-08-24 RX ORDER — TRAMADOL HYDROCHLORIDE 50 MG/1
1 TABLET ORAL
Qty: 0 | Refills: 0 | DISCHARGE

## 2020-08-24 RX ORDER — SODIUM CHLORIDE 9 MG/ML
1000 INJECTION INTRAMUSCULAR; INTRAVENOUS; SUBCUTANEOUS
Refills: 0 | Status: DISCONTINUED | OUTPATIENT
Start: 2020-08-24 | End: 2020-08-25

## 2020-08-24 RX ORDER — POLYETHYLENE GLYCOL 3350 17 G/17G
17 POWDER, FOR SOLUTION ORAL DAILY
Refills: 0 | Status: DISCONTINUED | OUTPATIENT
Start: 2020-08-24 | End: 2020-08-25

## 2020-08-24 RX ADMIN — Medication 1 GRAM(S): at 17:51

## 2020-08-24 RX ADMIN — PANTOPRAZOLE SODIUM 40 MILLIGRAM(S): 20 TABLET, DELAYED RELEASE ORAL at 06:25

## 2020-08-24 RX ADMIN — POLYETHYLENE GLYCOL 3350 17 GRAM(S): 17 POWDER, FOR SOLUTION ORAL at 12:23

## 2020-08-24 RX ADMIN — ENOXAPARIN SODIUM 40 MILLIGRAM(S): 100 INJECTION SUBCUTANEOUS at 11:50

## 2020-08-24 RX ADMIN — SODIUM CHLORIDE 75 MILLILITER(S): 9 INJECTION INTRAMUSCULAR; INTRAVENOUS; SUBCUTANEOUS at 17:22

## 2020-08-24 RX ADMIN — AMLODIPINE BESYLATE 10 MILLIGRAM(S): 2.5 TABLET ORAL at 06:25

## 2020-08-24 RX ADMIN — Medication 1 GRAM(S): at 11:50

## 2020-08-24 RX ADMIN — Medication 81 MILLIGRAM(S): at 11:52

## 2020-08-24 RX ADMIN — Medication 1 GRAM(S): at 06:25

## 2020-08-24 NOTE — PROGRESS NOTE ADULT - PROBLEM SELECTOR PLAN 1
- Pt had episode of chest pain and dizziness this morning. denies SOB  - Troponin neg x 2, BNP wnl  - Cont telemetry   - Plan for TTE today  - Lipid panel and TSH wnl  - Plan for stress test as an outpatient   - Seen by cards in the ED, plan for outpatient follow up with Dr. Bullard   - Cont Lovenox 40mg for ppx  - Cont ASA 81mg daily  - b/l LE doppler and CTA showed no evidence of PE - Pt had episode of chest pain and dizziness this morning. denies SOB  - Troponin neg x 2, BNP wnl  - Cont telemetry   - Plan for TTE today, f/u results  - Lipid panel and TSH wnl  - Plan for stress test as an outpatient   - Seen by cards in the ED, plan for outpatient follow up with Dr. Bullard   - Cont Lovenox 40mg for ppx  - Cont ASA 81mg daily  - b/l LE doppler and CTA showed no evidence of PE - Pt had episode of chest pain and dizziness this morning. denies SOB  - Troponin neg x 2, BNP wnl  - Monitor on telemetry   - Plan for TTE today, f/u results  - Lipid panel and TSH wnl  - Plan for stress test as an outpatient   - Seen by cards in the ED, plan for outpatient follow up with Dr. Bullard   - Cont Lovenox 40mg for ppx  - Cont ASA 81mg daily  - BLE doppler neg for DVT  - CTA showed no evidence of PE - Pt had episode of atypical chest pain and dizziness, denies SOB  - Troponin neg x 2, BNP wnl  - Monitor on telemetry   - Plan for TTE today, f/u results  - Lipid panel and TSH wnl  - Plan for stress test as an outpatient   - Seen by cardio in the ED, plan for outpatient follow up with Dr. Bullard   - Cont Lovenox 40mg for ppx  - Cont ASA 81mg daily  - BLE doppler neg for DVT  - CTA showed no evidence of PE

## 2020-08-24 NOTE — DISCHARGE NOTE PROVIDER - NSDCMRMEDTOKEN_GEN_ALL_CORE_FT
amLODIPine 10 mg oral tablet: 1 tab(s) orally once a day    *of note: prescribed at clinic 06/30/20. No longer taking.  olmesartan 5 mg oral tablet: 1 tab(s) orally once a day    *Of note: filled 7/26/20 by clinic in [place], as per patient, no longer taking  pantoprazole 20 mg oral delayed release tablet: 1 tab(s) orally once a day  sucralfate 1 g/10 mL oral suspension: 10 milliliter(s) orally every 12 hours  traMADol 50 mg oral tablet: 1 tab(s) orally every 6 hours, As Needed  verapamil 120 mg/24 hours oral capsule, extended release: 1 cap(s) orally 2 times a day amLODIPine 10 mg oral tablet: 1 tab(s) orally once a day    *of note: prescribed at clinic 06/30/20. No longer taking.  olmesartan 5 mg oral tablet: 1 tab(s) orally once a day    *Of note: filled 7/26/20 by clinic in [place], as per patient, no longer taking  pantoprazole 20 mg oral delayed release tablet: 1 tab(s) orally once a day  sucralfate 1 g/10 mL oral suspension: 10 milliliter(s) orally every 12 hours  verapamil 120 mg/24 hours oral capsule, extended release: 1 cap(s) orally 2 times a day  ZyrTEC 10 mg oral tablet: 1 tab(s) orally once a day pantoprazole 20 mg oral delayed release tablet: 1 tab(s) orally once a day  sucralfate 1 g/10 mL oral suspension: 10 milliliter(s) orally every 12 hours  verapamil 120 mg/24 hours oral capsule, extended release: 1 cap(s) orally 2 times a day  ZyrTEC 10 mg oral tablet: 1 tab(s) orally once a day

## 2020-08-24 NOTE — PROGRESS NOTE ADULT - PROBLEM SELECTOR PLAN 2
- Cont pantoprazole 40 mg daily  - If chest pain workup is neg, there is high clinical suspicion for h pylori infection  - Recommend GI consultation for evaluation of h pylori infection and outpatient follow up  - Plan for EGD Tuesday 8/25 - Cont pantoprazole 40 mg daily, carafate 1 g QID  - If chest pain workup is neg, there is high clinical suspicion for h pylori infection  - Recommend GI consultation for evaluation of h pylori infection and outpatient follow up  - Plan for EGD Tuesday 8/25, f/u - Cont pantoprazole 40 mg daily, carafate 1 g QID  - Start triple therapy?  - If chest pain workup is neg, there is high clinical suspicion for h pylori infection  - Recommend GI consultation for evaluation of h pylori infection and outpatient follow up  - Plan for EGD Tuesday 8/25, f/u - Cont pantoprazole 40 mg daily, carafate 1 g QID  - Start triple therapy?  - If chest pain workup is neg, there is high clinical suspicion for h pylori infection  - Recommend GI consultation for evaluation of h pylori infection and outpatient follow up  - Plan for EGD Tuesday 8/25  Pt is medically optimized for this low risk procedure. - Cont pantoprazole 40 mg daily, carafate 1 g QID  - Start triple therapy when discharged, d/w GI Dr. Yang   - If chest pain workup is neg, there is high clinical suspicion for h pylori infection  - Recommend GI consultation for evaluation of h pylori infection and outpatient follow up  - Plan for EGD Tuesday 8/25  - GI Dr. Hernandez/Celia following recs appreciated   - Pt is low risk and is medically optimized for this low risk procedure. - Cont pantoprazole 40 mg daily, carafate 1 g QID  - Start triple therapy when discharged, d/w GI Dr. Yang   - If chest pain workup is neg, there is high clinical suspicion for h pylori infection  - Recommend GI consultation for evaluation of h pylori infection and outpatient follow up  - Plan for EGD Tuesday 8/25  - GI Dr. Hernandez/Celia following recs appreciated   - Pt is low risk and is medically optimized for this low risk procedure.  - NS@75

## 2020-08-24 NOTE — PROGRESS NOTE ADULT - PROBLEM SELECTOR PLAN 1
h/o h pylori; sp tx  ct ap neg for acute gi pathology  cont ppi and carafate  gentle bowel regimen  prn pain control  diet as tolerated  will plan for egd tomorrow, pt agreeable, npo p mn, will need med clearance; cards input appreciated

## 2020-08-24 NOTE — DISCHARGE NOTE PROVIDER - NSDCCPCAREPLAN_GEN_ALL_CORE_FT
PRINCIPAL DISCHARGE DIAGNOSIS  Diagnosis: Chest pain  Assessment and Plan of Treatment: You were admitted for chest pain and dizziness  -Cardiology Dr. Kurtz evaluated you  -Cardiac workup was done-cardiac enzymes, ekg, blood work was all negative  -TTE (echocardiogram) was done on 8/24 which showed -----  -You were monitored and remained stable  -Doppler was negative for deep vein thrombosis (clot in your legs)  -CTA was negative for pulmonary emmbolism (clot in your lungs)  -You were started on Lovenox and aspirin---  -Please get a stress test as outpatient with Dr. James   -  and H pylori gastritis. Pt has h/o H pylori and was being treated for it by PCP the last 2 weeks without relief., c/o epigastric pain on admission. Gastroenterology Dr. Hernandez was consulted. Pt was continued on protonix and carafate. EGD was done on 8/25 which showed ------  Pt h/o HTN, BP was elevated 169/100 on admission-pt was in hypertensive urgency and got Labetolol x1. CT head was negative. Pt was managed on BP regimen and remained stable. Pt had some lower extremity weakness which was more MSK related/muscle pain-CPK was nl. Pulmonary nodule was found on CT, will f/u outpatient for it.   outpatienet GI      SECONDARY DISCHARGE DIAGNOSES  Diagnosis: Hypertensive urgency  Assessment and Plan of Treatment:     Diagnosis: Abdominal pain  Assessment and Plan of Treatment: PRINCIPAL DISCHARGE DIAGNOSIS  Diagnosis: Chest pain  Assessment and Plan of Treatment: You were admitted for chest pain and dizziness  -Cardiology Dr. Kurtz evaluated you  -Cardiac workup was done-cardiac enzymes, ekg, blood work was all negative  -TTE (echocardiogram) was done on 8/24 which showed -----  -You were monitored and remained stable  -Doppler was negative for deep vein thrombosis (clot in your legs)  -CTA was negative for pulmonary emmbolism (clot in your lungs)  -You were started on Lovenox and aspirin---  -Please get a stress test as outpatient with Dr. James within one week of discharge  -Please follow up with your PCP Dr. Coats within one week of discharge      SECONDARY DISCHARGE DIAGNOSES  Diagnosis: Epigastric pain  Assessment and Plan of Treatment: You have a history of H pylori gastritis and had epigastric pain on admission.   -Gastroenterology Dr. Hernandez evaluated you  -Pt were continued on Protonix and Carafate  -Endosccopy was done on 8/25 which showed ------    -Please follow up with Gastroenterologist Dr. Hernandez within one week of discharge   -Please follow up with your PCP Dr. Coats within one week of discharge       Diagnosis: Hypertensive urgency  Assessment and Plan of Treatment: You have a history of high blood pressure and your BP was elevated at 169/100 on admission   -You were given Labetolol in the ED  -CT head was negative  -You were placed on a medical regimen and your blood pressure remained stable    Diagnosis: Lower extremity weakness  Assessment and Plan of Treatment: You had some lower extremity weakness which was more musculoskeeletal related with calf pain   -Your blood work remained stable   -Please follow up with your PCP Dr. Coats within one week of discharge       Diagnosis: Pulmonary nodule  Assessment and Plan of Treatment: A pulmonary nodule was found on CT, no intervention was needed at this time.  -Please follow up with your PCP Dr. Coats within one week of discharge PRINCIPAL DISCHARGE DIAGNOSIS  Diagnosis: Chest pain  Assessment and Plan of Treatment: You were admitted for chest pain and dizziness  -Cardiology Dr. Kurtz evaluated you  -Cardiac workup was done-cardiac enzymes, ekg, blood work was all negative  -TTE (echocardiogram) was done on 8/24 which showed -----  -You were monitored and remained stable  -Doppler was negative for deep vein thrombosis (clot in your legs)  -CTA was negative for pulmonary emmbolism (clot in your lungs)  -Please get a stress test as outpatient with Dr. James within one week of discharge  -Please follow up with your PCP Dr. Coats within one week of discharge      SECONDARY DISCHARGE DIAGNOSES  Diagnosis: Epigastric pain  Assessment and Plan of Treatment: You have a history of H pylori gastritis and had epigastric pain on admission.   -Gastroenterology Dr. Hernandez evaluated you  -Pt were continued on Protonix and Carafate  -Endoscopy was done on 8/25 which showed ------    -CONTINUE Protonic and Carafate----triple therapy  -Please follow up with Gastroenterologist Dr. Hernandez within one week of discharge   -Please follow up with your PCP Dr. Coats within one week of discharge       Diagnosis: Hypertensive urgency  Assessment and Plan of Treatment: You have a history of high blood pressure and your BP was elevated at 169/100 on admission   -You were given Labetolol in the ED  -CT head was negative  -You were placed on a medical regimen and your blood pressure remained stable  -Continue home Verapamil  -Please follow up with your PCP Dr. Coats within one week of discharge in regards to proper BP regimen    Diagnosis: Lower extremity weakness  Assessment and Plan of Treatment: You had some lower extremity weakness which was more musculoskeletal related with calf pain which improved  -Your blood work remained stable   -Please follow up with your PCP Dr. Coats within one week of discharge      Diagnosis: Pulmonary nodule  Assessment and Plan of Treatment: A pulmonary nodule was found on CT, no intervention was needed at this time.  -Please follow up with your PCP Dr. Coats within one week of discharge  -Please follow up with Pulm ----- PRINCIPAL DISCHARGE DIAGNOSIS  Diagnosis: Chest pain  Assessment and Plan of Treatment: You were admitted for chest pain and dizziness  -Cardiology Dr. Kurtz evaluated you  -Cardiac workup was done-cardiac enzymes, ekg, blood work was all negative  -TTE (echocardiogram) was done on 8/24 which showed -----  -You were monitored and remained stable  -Doppler was negative for deep vein thrombosis (clot in your legs)  -CTA was negative for pulmonary emmbolism (clot in your lungs)  -Please get a stress test as outpatient with Dr. James within one week of discharge  -Please follow up with your PCP Dr. Coats within one week of discharge      SECONDARY DISCHARGE DIAGNOSES  Diagnosis: Epigastric pain  Assessment and Plan of Treatment: You have a history of H pylori gastritis and had epigastric pain on admission.   -Gastroenterology Dr. Hernandez evaluated you  -Pt were continued on Protonix and Carafate  -Endoscopy was done on 8/25 which showed gastritis    -CONTINUE Protonic and Carafate  -Please follow up with Gastroenterologist Dr. Hernandez within one week of discharge   -Please follow up with your PCP Dr. Coats within one week of discharge       Diagnosis: Hypertensive urgency  Assessment and Plan of Treatment: You have a history of high blood pressure and your BP was elevated at 169/100 on admission   -You were given Labetolol in the ED  -CT head was negative  -You were placed on a medical regimen and your blood pressure remained stable  -Continue home Verapamil  -Please follow up with your PCP Dr. Coats within one week of discharge in regards to proper BP regimen    Diagnosis: Lower extremity weakness  Assessment and Plan of Treatment: You had some lower extremity weakness which was more musculoskeletal related with calf pain which improved  -Your blood work remained stable   -Please follow up with your PCP Dr. Coats within one week of discharge      Diagnosis: Pulmonary nodule  Assessment and Plan of Treatment: A pulmonary nodule was found on CT, no intervention was needed at this time.  -Please follow up with your PCP Dr. Coats within one week of discharge  -Please follow up with Pulmonology Dr. Ball within one week of discharge. PRINCIPAL DISCHARGE DIAGNOSIS  Diagnosis: Chest pain  Assessment and Plan of Treatment: You were admitted for chest pain and dizziness  -Cardiology Dr. Kurtz evaluated you  -Cardiac workup was done-cardiac enzymes, ekg, blood work was all negative  -TTE (echocardiogram) was done on 8/24 which was negative for any cardiac structural abnormalities  -You were monitored and remained stable  -Doppler was negative for deep vein thrombosis (clot in your legs)  -CTA was negative for pulmonary emmbolism (clot in your lungs)  -Please get a stress test as outpatient with Dr. James within one week of discharge  -Please follow up with your PCP Dr. Coats within one week of discharge      SECONDARY DISCHARGE DIAGNOSES  Diagnosis: Epigastric pain  Assessment and Plan of Treatment: You have a history of H pylori gastritis and had epigastric pain on admission.   -Gastroenterology Dr. Hernandez evaluated you  -Pt were continued on Protonix and Carafate  -Endoscopy was done on 8/25 by Dr. Yang which showed gastritis    -CONTINUE Protonix and Carafate  -Please follow up with Gastroenterologist Dr. Hernandez within one week of discharge   -Please follow up with your PCP Dr. Coats within one week of discharge       Diagnosis: Hypertensive urgency  Assessment and Plan of Treatment: You have a history of high blood pressure and your BP was elevated at 169/100 on admission   -You were given Labetolol in the ED  -CT head was negative  -You were placed on a medical regimen and your blood pressure remained stable  -Continue home Verapamil  -Please follow up with your PCP Dr. Coats within one week of discharge in regards to proper BP regimen    Diagnosis: Lower extremity weakness  Assessment and Plan of Treatment: You had some lower extremity weakness which was more musculoskeletal related with calf pain which improved  -Your blood work remained stable   -Please follow up with your PCP Dr. Coats within one week of discharge      Diagnosis: Pulmonary nodule  Assessment and Plan of Treatment: A pulmonary nodule was found on CT, no intervention was needed at this time.  -Please follow up with your PCP Dr. Coats within one week of discharge  -Please follow up with Pulmonology Dr. Ball within one week of discharge.

## 2020-08-24 NOTE — PROGRESS NOTE ADULT - ASSESSMENT
59 yo male with past medical history of hypertension and H.plyori who presents with complains of intermittent epigastric pain radiating into his chest for about 1 month and a half found to have hypertensive urgency. Being admitted for chest pain with concern for possible MI vs PE given his chest pain. However, given normal ECG and neg cardiac enzymes, also consider gastritis given history of h pylori 59 yo male with past medical history of hypertension and H.plyori who presents with complaints of intermittent epigastric pain radiating into his chest for about 1 month and a half found to have hypertensive urgency. Being admitted for chest pain with concern for possible MI vs PE given his chest pain. However, given normal ECG and neg cardiac enzymes, also consider gastritis given history of h pylori

## 2020-08-24 NOTE — PROGRESS NOTE ADULT - PROBLEM SELECTOR PLAN 5
-currently on Lovenox 40mg -Currently on Lovenox 40mg -Currently on Lovenox 40mg    -PT consult-f/u recs -Pulmonary nodules on CT, will need outpatient follow up

## 2020-08-24 NOTE — DISCHARGE NOTE PROVIDER - CARE PROVIDERS DIRECT ADDRESSES
,DirectAddress_Unknown,[email or web address],DirectAddress_Unknown ,DirectAddress_Unknown,[email or web address],DirectAddress_Unknown,DirectAddress_Unknown

## 2020-08-24 NOTE — DISCHARGE NOTE PROVIDER - PROVIDER TOKENS
PROVIDER:[TOKEN:[[id number]]],PROVIDER:[TOKEN:[[id number]]],PROVIDER:[TOKEN:[[id number]]] PROVIDER:[TOKEN:[[id number]]],PROVIDER:[TOKEN:[[id number]]],PROVIDER:[TOKEN:[[id number]]],PROVIDER:[TOKEN:[[id number]]]

## 2020-08-24 NOTE — PHYSICAL THERAPY INITIAL EVALUATION ADULT - ADDITIONAL COMMENTS
Pt was independent with mobility prior to admission. Pt lives in a house with his wife with no stairs to enter and none inside. Pt has no DME.

## 2020-08-24 NOTE — PROGRESS NOTE ADULT - ATTENDING COMMENTS
Advanced care planning was discussed with patient and family.  Advanced care planning forms were reviewed and discussed.  Risks, benefits and alternatives of gastroenterologic procedures were discussed in detail and all questions were answered.    30 minutes spent.
Patient seen and examined at bedside. Case discussed during rounds  Patient admitted for chest pain, abdominal pain and lower extremity weakness    Today: patient denies any chest pain. Continues to report “upper abdomen pain”  Ddimer is minimally elevated and although based on age adjusted levels, patients D dimer is ESSENTIALLY negative.  Appreciate cards/ GI recs: a. for EGD tomorrow. B. Outpatient stress test    O;/E> lower extremities: sensation in tact bilaterally. Muscle tone , strength on flexors extensors are 5/5.  Patient can also elevate both elevate legs >75 degrees with NO discomfort on the back, Writer did receive report from patient a feeling of “bilateral calf tightening on elevation of legs”    Plan:  EGD tomorrow  start triple therapy empirically  PT  DC based on the EGD  Based on this trajectory, I suspect 24  hours more of hospitalization

## 2020-08-24 NOTE — PROGRESS NOTE ADULT - SUBJECTIVE AND OBJECTIVE BOX
[facility]       HPI:  61 yo male with past medical history of hypertension and h.mei who presents with complains of intermittent  epigastric pain radiating into his chest for about 1 month and a half. Reports worsening epigastric pain with movement and after eating meals and improvement with surcalfate.  Patient reports he was diagnosed with h.mei about the same time his symptoms started and was treat his h mei for 2 weeks by his PCP but has not had follow up testing as of yet. Patient states he came to the ED last night because he was awoken from sleep with pain and a "cold sweat" and a headache . Denies any SOB, dizziness,  new change in chest pain, new radiation to neck or back or arms, denies nausea, new bowel or bladder issues, change in stool, change in weight. Denies any dizziness, lightheadedness, change in vision, palpitations, back pain, difficulty with ambulation     VS in the ED, elevated /100 which improved after receiving Labetalol 10 mg Iv x 1.  Labs notable for neg Troponin x 2, normal BNP,  elevated D-Dimer 373. EKG demonstrated NSR. CT ABD was neg for acute intra-abdominal pathology CT Head was neg.   Was seen by cardiology ( Dr. Kurtz) in the ED, given elevated D-dimer plan to admit for chest pain workup with r/o for MI and possible PE and was started on full dose ASA and Lovenox and amlodipine and metorpolol  while in the ED (23 Aug 2020 07:31)        SUBJECTIVE:      ALLERGIES:  Allergies    No Known Allergies    Intolerances          MEDICATIONS  (STANDING):  amLODIPine   Tablet 10 milliGRAM(s) Oral daily  aspirin  chewable 81 milliGRAM(s) Oral daily  enoxaparin Injectable 40 milliGRAM(s) SubCutaneous daily  metoprolol succinate ER 25 milliGRAM(s) Oral daily  pantoprazole    Tablet 40 milliGRAM(s) Oral before breakfast  sucralfate suspension 1 Gram(s) Oral four times a day    MEDICATIONS  (PRN):      REVIEW OF SYSTEMS:  CONSTITUTIONAL: No fever,  RESPIRATORY: No cough, wheezing, shortness of breath  CARDIOVASCULAR: No chest pain, dyspnea, palpitations, dizziness, syncope, paroxysmal nocturnal dyspnea, orthopnea, or arm or leg swelling  GASTROINTESTINAL: No abdominal  or epigastric pain, nausea, vomiting,  diarrhea  NEUROLOGICAL: No headaches,  loss of strength, numbness, or tremors    Vital Signs Last 24 Hrs  T(C): 36.9 (24 Aug 2020 07:35), Max: 36.9 (23 Aug 2020 14:22)  T(F): 98.4 (24 Aug 2020 07:35), Max: 98.5 (23 Aug 2020 14:22)  HR: 53 (24 Aug 2020 07:35) (53 - 97)  BP: 115/75 (24 Aug 2020 07:35) (113/72 - 118/75)  BP(mean): --  RR: 16 (24 Aug 2020 07:35) (15 - 16)  SpO2: 95% (24 Aug 2020 07:35) (95% - 97%)    PHYSICAL EXAM:  HEAD:  Atraumatic, Normocephalic  NECK: Supple and normal thyroid.  No JVD or carotid bruit.   HEART: S1, S2 regular , 1/6 soft ejection systolic murmur in mitral area , no thrill and no gallops .  PULMONARY: Bilateral vesicular breathing , few scattered ronchi and few scattered rales are present .  ABDOMEN: Soft nontender and positive bowl sounds   EXTREMITIES:  No clubbing, cyanosis, or pedal  edema  NEUROLOGICAL: AAOX3 , no focal deficit .    LABS:                        14.6   4.49  )-----------( 200      ( 24 Aug 2020 06:57 )             41.3     08-24    141  |  109<H>  |  12  ----------------------------<  97  4.2   |  29  |  0.92    Ca    9.7      24 Aug 2020 06:57  Mg     2.2     08-24    TPro  7.5  /  Alb  4.2  /  TBili  1.3<H>  /  DBili  x   /  AST  19  /  ALT  25  /  AlkPhos  111  08-22    CARDIAC MARKERS ( 23 Aug 2020 06:21 )  <.015 ng/mL / x     / x     / x     / x      CARDIAC MARKERS ( 22 Aug 2020 23:45 )  <.015 ng/mL / x     / x     / x     / x          PT/INR - ( 22 Aug 2020 23:45 )   PT: 12.5 sec;   INR: 1.07 ratio         PTT - ( 22 Aug 2020 23:45 )  PTT:25.0 sec    BNP      EKG:  ECHO:  IMAGING:    Assessment/Plan    Will continue to follow during hospital course and give further recommendations as needed . Thanks for your referral . if any questions please contact me at office ([phone] [phone]) [place] MD [facility]   Cardiology F/U :      HPI:  59 yo male with past medical history of hypertension and h.mei who presents with complains of intermittent  epigastric pain radiating into his chest for about 1 month and a half. Reports worsening epigastric pain with movement and after eating meals and improvement with surcalfate.  Patient reports he was diagnosed with h.mei about the same time his symptoms started and was treat his h mei for 2 weeks by his PCP but has not had follow up testing as of yet. Patient states he came to the ED last night because he was awoken from sleep with pain and a "cold sweat" and a headache . Denies any SOB, dizziness,  new change in chest pain, new radiation to neck or back or arms, denies nausea, new bowel or bladder issues, change in stool, change in weight. Denies any dizziness, lightheadedness, change in vision, palpitations, back pain, difficulty with ambulation     VS in the ED, elevated /100 which improved after receiving Labetalol 10 mg Iv x 1.  Labs notable for neg Troponin x 2, normal BNP,  elevated D-Dimer 373. EKG demonstrated NSR. CT ABD was neg for acute intra-abdominal pathology CT Head was neg.   Was seen by cardiology ( Dr. Kurtz) in the ED, given elevated D-dimer plan to admit for chest pain workup with r/o for MI and possible PE and was started on full dose ASA and Lovenox and amlodipine and metorpolol  while in the ED (23 Aug 2020 07:31)        SUBJECTIVE: Patient lying comfortable .      ALLERGIES:  Allergies    No Known Allergies    Intolerances          MEDICATIONS  (STANDING):  amLODIPine   Tablet 10 milliGRAM(s) Oral daily  aspirin  chewable 81 milliGRAM(s) Oral daily  enoxaparin Injectable 40 milliGRAM(s) SubCutaneous daily  metoprolol succinate ER 25 milliGRAM(s) Oral daily  pantoprazole    Tablet 40 milliGRAM(s) Oral before breakfast  sucralfate suspension 1 Gram(s) Oral four times a day    MEDICATIONS  (PRN):      REVIEW OF SYSTEMS:  CONSTITUTIONAL: No fever,  RESPIRATORY: No cough, wheezing, shortness of breath  CARDIOVASCULAR: No chest pain, dyspnea, palpitations, dizziness, syncope, paroxysmal nocturnal dyspnea, orthopnea, or arm or leg swelling  GASTROINTESTINAL: No abdominal  or epigastric pain, nausea, vomiting,  diarrhea  NEUROLOGICAL: No headaches,  loss of strength, numbness, or tremors  Skin : No itching .  Hematology : No bleeding .  Endocrinology : No heat and cold intolerance .  Psychiatry : Patient is calm.  Musculoskeletal : Patient has mild arthritis .      Vital Signs Last 24 Hrs  T(C): 36.9 (24 Aug 2020 07:35), Max: 36.9 (23 Aug 2020 14:22)  T(F): 98.4 (24 Aug 2020 07:35), Max: 98.5 (23 Aug 2020 14:22)  HR: 53 (24 Aug 2020 07:35) (53 - 97)  BP: 115/75 (24 Aug 2020 07:35) (113/72 - 118/75)  BP(mean): --  RR: 16 (24 Aug 2020 07:35) (15 - 16)  SpO2: 95% (24 Aug 2020 07:35) (95% - 97%)    PHYSICAL EXAM:  HEAD:  Atraumatic, Normocephalic  NECK: Supple and normal thyroid.  No JVD or carotid bruit.   HEART: S1, S2 regular , 1/6 soft ejection systolic murmur in mitral area , no thrill and no gallops .  PULMONARY: Bilateral vesicular breathing , few scattered ronchi and few scattered rales are present .  ABDOMEN: Soft nontender and positive bowl sounds   EXTREMITIES:  No clubbing, cyanosis, or pedal  edema  NEUROLOGICAL: AAOX3 , no focal deficit .  Skin : No rashes .  Musculoskeletal : No joint swellings .    LABS:                        14.6   4.49  )-----------( 200      ( 24 Aug 2020 06:57 )             41.3     08-24    141  |  109<H>  |  12  ----------------------------<  97  4.2   |  29  |  0.92    Ca    9.7      24 Aug 2020 06:57  Mg     2.2     08-24    TPro  7.5  /  Alb  4.2  /  TBili  1.3<H>  /  DBili  x   /  AST  19  /  ALT  25  /  AlkPhos  111  08-22    CARDIAC MARKERS ( 23 Aug 2020 06:21 )  <.015 ng/mL / x     / x     / x     / x      CARDIAC MARKERS ( 22 Aug 2020 23:45 )  <.015 ng/mL / x     / x     / x     / x          PT/INR - ( 22 Aug 2020 23:45 )   PT: 12.5 sec;   INR: 1.07 ratio         PTT - ( 22 Aug 2020 23:45 )  PTT:25.0 sec    Assessment/Plan  Patient has :  1) Atypical chest pain . Troponin I negative .  2) Abdominal pain , GERD R/O PUD .  3) Hypertension and BP stable so far .  Plan : 1) Patient cardiac wise stable and cleared for EGD . 2) Discontinue monitor .  Will continue to follow during hospital course and give further recommendations as needed . Thanks for your referral . if any questions please contact me at office ([phone])

## 2020-08-24 NOTE — DISCHARGE NOTE PROVIDER - HOSPITAL COURSE
FROM ADMISSION H+P:     HPI:    61 yo male with past medical history of hypertension and h.mei who presents with complains of intermittent  epigastric pain radiating into his chest for about 1 month and a half. Reports worsening epigastric pain with movement and after eating meals and improvement with surcalfate.  Patient reports he was diagnosed with h.mei about the same time his symptoms started and was treat his h mei for 2 weeks by his PCP but has not had follow up testing as of yet. Patient states he came to the ED last night because he was awoken from sleep with pain and a "cold sweat" and a headache . Denies any SOB, dizziness,  new change in chest pain, new radiation to neck or back or arms, denies nausea, new bowel or bladder issues, change in stool, change in weight. Denies any dizziness, lightheadedness, change in vision, palpitations, back pain, difficulty with ambulation         VS in the ED, elevated /100 which improved after receiving Labetalol 10 mg Iv x 1.  Labs notable for neg Troponin x 2, normal BNP,  elevated D-Dimer 373. EKG demonstrated NSR. CT ABD was neg for acute intra-abdominal pathology CT Head was neg.   Was seen by cardiology ( Dr. Kurtz) in the ED, given elevated D-dimer plan to admit for chest pain workup with r/o for MI and possible PE and was started on full dose ASA and Lovenox and amlodipine and metorpolol  while in the ED (23 Aug 2020 07:31)            ---    HOSPITAL COURSE:             Patient is stable for discharge as per primary medical team and consultants.        PT consulted, recommends discharge ______        Patient showed improvement throughout hospitalization. Patient was seen and examined on day of discharge. Patient was medically optimized for discharge _____ with close outpatient folllow up.        T(C): 36.8 (08-24-20 @ 16:08), Max: 36.9 (08-23-20 @ 19:18)    HR: 63 (08-24-20 @ 16:08) (53 - 63)    BP: 121/78 (08-24-20 @ 16:08) (114/70 - 121/78)    RR: 16 (08-24-20 @ 16:08) (16 - 16)    SpO2: 97% (08-24-20 @ 16:08) (95% - 97%)        PHYSICAL EXAM:        ---    CONSULTANTS:         ---    TIME SPENT:    I, the attending physician, was physically present for the key portions of the evaluation and management (E/M) service provided. The total amount of time spent reviewing the hospital notes, laboratory values, imaging findings, assessing/counseling the patient, discussing with consultant physicians, social work, nursing staff was -- minutes        ---    Primary care provider was made aware of plan for discharge:      [  ] NO     [  ] YES FROM ADMISSION H+P:     HPI:    59 yo male with past medical history of hypertension and h.plyori who presents with complains of intermittent  epigastric pain radiating into his chest for about 1 month and a half. Reports worsening epigastric pain with movement and after eating meals and improvement with surcalfate.  Patient reports he was diagnosed with h.plyori about the same time his symptoms started and was treat his h plyori for 2 weeks by his PCP but has not had follow up testing as of yet. Patient states he came to the ED last night because he was awoken from sleep with pain and a "cold sweat" and a headache . Denies any SOB, dizziness,  new change in chest pain, new radiation to neck or back or arms, denies nausea, new bowel or bladder issues, change in stool, change in weight. Denies any dizziness, lightheadedness, change in vision, palpitations, back pain, difficulty with ambulation         VS in the ED, elevated /100 which improved after receiving Labetalol 10 mg Iv x 1.  Labs notable for neg Troponin x 2, normal BNP,  elevated D-Dimer 373. EKG demonstrated NSR. CT ABD was neg for acute intra-abdominal pathology CT Head was neg.   Was seen by cardiology ( Dr. Kurtz) in the ED, given elevated D-dimer plan to admit for chest pain workup with r/o for MI and possible PE and was started on full dose ASA and Lovenox and amlodipine and metorpolol  while in the ED (23 Aug 2020 07:31)            ---    HOSPITAL COURSE:     Pt was admitted for chest pain and H pylori gastritis. Cardiology Dr. Kurtz was consulted. Cardiac workup-tropinin x2 neg, BNNP wnl, ecg neg, TTE was done on 8/24 which showed -----. Pt was monitored on tele and remained stable. Pt had some dizziness with the CP. BLE Doppler was neg for DVT, CTA was neg for PE. Pt was started on Lovenox ppx and aspirin. Pt remained stable during hospital stay. Pt has h/o H pylori and was being treated for it by PCP the last 2 weeks without relief., c/o epigastric pain on admission. Gastroenterology Dr. Hernandez was consulted. Pt was continued on protonix and carafate. EGD was done on 8/25 which showed ------  Pt h/o HTN, BP was elevated 169/100 on admission-pt was in hypertensive urgency and got Labetolol x1. CT head was negative. Pt was managed on BP regimen and remained stable. Pt had some lower extremity weakness which was more MSK related/muscle pain-CPK was nl. Pulmonary nodule was found on CT, will f/u outpatient for it.         Stress test as outpatient-dr chen     [facility]        Patient is stable for discharge as per primary medical team and consultants.        PT consulted, recommends discharge ______        Patient showed improvement throughout hospitalization. Patient was seen and examined on day of discharge. Patient was medically optimized for discharge _____ with close outpatient folllow up.        T(C): 36.8 (08-24-20 @ 16:08), Max: 36.9 (08-23-20 @ 19:18)    HR: 63 (08-24-20 @ 16:08) (53 - 63)    BP: 121/78 (08-24-20 @ 16:08) (114/70 - 121/78)    RR: 16 (08-24-20 @ 16:08) (16 - 16)    SpO2: 97% (08-24-20 @ 16:08) (95% - 97%)        PHYSICAL EXAM:        ---    CONSULTANTS:     Cardio-Dr. Kurtz    GI-Dr. Hernandez         ---    TIME SPENT:    I, the attending physician, was physically present for the key portions of the evaluation and management (E/M) service provided. The total amount of time spent reviewing the hospital notes, laboratory values, imaging findings, assessing/counseling the patient, discussing with consultant physicians, social work, nursing staff was -- minutes        ---    Primary care provider was made aware of plan for discharge:      [  ] NO     [  ] YES FROM ADMISSION H+P:     HPI:    61 yo male with past medical history of hypertension and h.plyori who presents with complains of intermittent  epigastric pain radiating into his chest for about 1 month and a half. Reports worsening epigastric pain with movement and after eating meals and improvement with surcalfate.  Patient reports he was diagnosed with h.plyori about the same time his symptoms started and was treat his h plyori for 2 weeks by his PCP but has not had follow up testing as of yet. Patient states he came to the ED last night because he was awoken from sleep with pain and a "cold sweat" and a headache . Denies any SOB, dizziness,  new change in chest pain, new radiation to neck or back or arms, denies nausea, new bowel or bladder issues, change in stool, change in weight. Denies any dizziness, lightheadedness, change in vision, palpitations, back pain, difficulty with ambulation         VS in the ED, elevated /100 which improved after receiving Labetalol 10 mg Iv x 1.  Labs notable for neg Troponin x 2, normal BNP,  elevated D-Dimer 373. EKG demonstrated NSR. CT ABD was neg for acute intra-abdominal pathology CT Head was neg.   Was seen by cardiology ( Dr. Kurtz) in the ED, given elevated D-dimer plan to admit for chest pain workup with r/o for MI and possible PE and was started on full dose ASA and Lovenox and amlodipine and metorpolol  while in the ED (23 Aug 2020 07:31)            ---    HOSPITAL COURSE:     Pt was admitted for chest pain and H pylori gastritis. Cardiology Dr. Kurtz was consulted. Cardiac workup-tropinin x2 neg, BNP wnl, ecg neg, TTE was done on 8/24 which showed -----. Pt was monitored on tele and remained stable. Pt had some dizziness with the CP. BLE Doppler was neg for DVT, CTA was neg for PE. Pt was started on Lovenox ppx and aspirin. Pt remained stable during hospital stay. Pt has h/o H pylori and was being treated for it by PCP the last 2 weeks without relief., c/o epigastric pain on admission. Gastroenterology Dr. Hernandez was consulted. Pt was continued on protonix and carafate. EGD was done on 8/25 which showed ------  Pt h/o HTN, BP was elevated 169/100 on admission-pt was in hypertensive urgency and got Labetolol x1. CT head was negative. Pt was managed on BP regimen and remained stable. Pt had some lower extremity weakness which was more MSK related/muscle pain-CPK was nl. Pulmonary nodule was found on CT, will f/u outpatient for it.         Stress test as outpatient-dr gustavo [facility]        Patient is stable for discharge as per primary medical team and consultants.        PT consulted, recommends discharge ______        Patient showed improvement throughout hospitalization. Patient was seen and examined on day of discharge. Patient was medically optimized for discharge _____ with close outpatient folllow up.        T(C): 36.8 (08-24-20 @ 16:08), Max: 36.9 (08-23-20 @ 19:18)    HR: 63 (08-24-20 @ 16:08) (53 - 63)    BP: 121/78 (08-24-20 @ 16:08) (114/70 - 121/78)    RR: 16 (08-24-20 @ 16:08) (16 - 16)    SpO2: 97% (08-24-20 @ 16:08) (95% - 97%)        PHYSICAL EXAM:        ---    CONSULTANTS:     Cardio-Dr. Kurtz    GI-Dr. Hernandez         ---    TIME SPENT:    I, the attending physician, was physically present for the key portions of the evaluation and management (E/M) service provided. The total amount of time spent reviewing the hospital notes, laboratory values, imaging findings, assessing/counseling the patient, discussing with consultant physicians, social work, nursing staff was -- minutes        ---    Primary care provider was made aware of plan for discharge:      [  ] NO     [  ] YES FROM ADMISSION H+P:     HPI:    59 yo male with past medical history of hypertension and h.plyori who presents with complains of intermittent  epigastric pain radiating into his chest for about 1 month and a half. Reports worsening epigastric pain with movement and after eating meals and improvement with surcalfate.  Patient reports he was diagnosed with h.plyori about the same time his symptoms started and was treat his h plyori for 2 weeks by his PCP but has not had follow up testing as of yet. Patient states he came to the ED last night because he was awoken from sleep with pain and a "cold sweat" and a headache . Denies any SOB, dizziness,  new change in chest pain, new radiation to neck or back or arms, denies nausea, new bowel or bladder issues, change in stool, change in weight. Denies any dizziness, lightheadedness, change in vision, palpitations, back pain, difficulty with ambulation         VS in the ED, elevated /100 which improved after receiving Labetalol 10 mg Iv x 1.  Labs notable for neg Troponin x 2, normal BNP,  elevated D-Dimer 373. EKG demonstrated NSR. CT ABD was neg for acute intra-abdominal pathology CT Head was neg.   Was seen by cardiology ( Dr. Kurtz) in the ED, given elevated D-dimer plan to admit for chest pain workup with r/o for MI and possible PE and was started on full dose ASA and Lovenox and amlodipine and metorpolol  while in the ED (23 Aug 2020 07:31)            ---    HOSPITAL COURSE:     Pt was admitted for chest pain and H pylori gastritis. Cardiology Dr. Kurtz was consulted. Cardiac workup-tropinin x2 neg, BNP wnl, ecg neg, TTE was done on 8/25 which showed -----. Pt was monitored on tele and remained stable. Pt had some dizziness with the CP. BLE Doppler was neg for DVT, CTA was neg for PE. Pt was started on Lovenox ppx and aspirin. Pt remained stable during hospital stay. Pt has h/o H pylori and was being treated for it by PCP the last 2 weeks without relief., c/o epigastric pain on admission. Gastroenterology Dr. Hernandez was consulted. Pt was continued on protonix and carafate. EGD was done on 8/25 which showed ------  Pt h/o HTN, BP was elevated 169/100 on admission-pt was in hypertensive urgency and got Labetolol x1. CT head was negative. Pt was managed on BP regimen and remained stable. Pt had some lower extremity weakness which was more MSK related/muscle pain-CPK was nl. Pulmonary nodule was found on CT, will f/u outpatient for it.         Patient is stable for discharge as per primary medical team and consultants.        PT consulted, no PT needs        Patient showed improvement throughout hospitalization. Patient was seen and examined on day of discharge. Patient was medically optimized for discharge with close outpatient folllow up.        Vital Signs Last 24 Hrs    T(C): 36.9 (25 Aug 2020 08:25), Max: 36.9 (25 Aug 2020 08:25)    T(F): 98.5 (25 Aug 2020 08:25), Max: 98.5 (25 Aug 2020 08:25)    HR: 59 (25 Aug 2020 08:25) (58 - 63)    BP: 110/69 (25 Aug 2020 08:25) (97/59 - 121/78)    BP(mean): --    RR: 16 (25 Aug 2020 08:25) (16 - 16)    SpO2: 96% (25 Aug 2020 08:25) (95% - 97%)        PHYSICAL EXAM:    GENERAL: NAD, resting comfortably in bed    HEENT:  anicteric, moist mucous membranes    CHEST/LUNG:  CTA b/l, no rales, wheezes, or rhonchi    HEART:  RRR, S1, S2, no murmurs or rubs    ABDOMEN:  BS+ x4, TTP epigastric region, soft, nondistended, no rebound/guarding    EXTREMITIES: no calf tenderness b/l, no edema, cyanosis, 5/5 strength BL, sensation intact, negative straight leg test     NERVOUS SYSTEM: answers questions and follows commands appropriately        ---    CONSULTANTS:     Cardio-Dr. Kurtz    GI-Dr. Hernandez         ---    TIME SPENT:    I, the attending physician, was physically present for the key portions of the evaluation and management (E/M) service provided. The total amount of time spent reviewing the hospital notes, laboratory values, imaging findings, assessing/counseling the patient, discussing with consultant physicians, social work, nursing staff was -- minutes        ---    Primary care provider was made aware of plan for discharge:      [  ] NO     [  ] YES FROM ADMISSION H+P:     HPI:    61 yo male with past medical history of hypertension and h.plyori who presents with complains of intermittent  epigastric pain radiating into his chest for about 1 month and a half. Reports worsening epigastric pain with movement and after eating meals and improvement with surcalfate.  Patient reports he was diagnosed with h.plyori about the same time his symptoms started and was treat his h plyori for 2 weeks by his PCP but has not had follow up testing as of yet. Patient states he came to the ED last night because he was awoken from sleep with pain and a "cold sweat" and a headache . Denies any SOB, dizziness,  new change in chest pain, new radiation to neck or back or arms, denies nausea, new bowel or bladder issues, change in stool, change in weight. Denies any dizziness, lightheadedness, change in vision, palpitations, back pain, difficulty with ambulation         VS in the ED, elevated /100 which improved after receiving Labetalol 10 mg Iv x 1.  Labs notable for neg Troponin x 2, normal BNP,  elevated D-Dimer 373. EKG demonstrated NSR. CT ABD was neg for acute intra-abdominal pathology CT Head was neg.   Was seen by cardiology ( Dr. Kurtz) in the ED, given elevated D-dimer plan to admit for chest pain workup with r/o for MI and possible PE and was started on full dose ASA and Lovenox and amlodipine and metorpolol  while in the ED (23 Aug 2020 07:31)            ---    HOSPITAL COURSE:     Pt was admitted for chest pain and H pylori gastritis. Cardiology Dr. Kurtz was consulted. Cardiac workup-tropinin x2 neg, BNP wnl, ecg neg, TTE was done on 8/25 which showed -----. Pt was monitored on tele and remained stable. Pt had some dizziness with the CP. BLE Doppler was neg for DVT, CTA was neg for PE. Pt was started on Lovenox ppx and aspirin. Pt remained stable during hospital stay. Pt has h/o H pylori and was being treated for it by PCP the last 2 weeks without relief., c/o epigastric pain on admission. Gastroenterology Dr. Hernandez was consulted. Pt was continued on protonix and carafate. EGD was done on 8/25 which showed ------  Pt h/o HTN, BP was elevated 169/100 on admission-pt was in hypertensive urgency and got Labetolol x1. CT head was negative. Pt was managed on BP regimen and remained stable. Pt had some lower extremity weakness which was more MSK related/muscle pain-CPK was nl. Symptoms improved once pt started walking around. Pulmonary nodule was found on CT, will f/u outpatient for it.         Patient is stable for discharge as per primary medical team and consultants.        PT consulted, no PT needs        Patient showed improvement throughout hospitalization. Patient was seen and examined on day of discharge. Patient was medically optimized for discharge with close outpatient folllow up.        Vital Signs Last 24 Hrs    T(C): 36.9 (25 Aug 2020 08:25), Max: 36.9 (25 Aug 2020 08:25)    T(F): 98.5 (25 Aug 2020 08:25), Max: 98.5 (25 Aug 2020 08:25)    HR: 59 (25 Aug 2020 08:25) (58 - 63)    BP: 110/69 (25 Aug 2020 08:25) (97/59 - 121/78)    BP(mean): --    RR: 16 (25 Aug 2020 08:25) (16 - 16)    SpO2: 96% (25 Aug 2020 08:25) (95% - 97%)        PHYSICAL EXAM:    GENERAL: NAD, resting comfortably in bed    HEENT:  anicteric, moist mucous membranes    CHEST/LUNG:  CTA b/l, no rales, wheezes, or rhonchi    HEART:  RRR, S1, S2, no murmurs or rubs    ABDOMEN:  BS+ x4, TTP epigastric region, soft, nondistended, no rebound/guarding    EXTREMITIES: no calf tenderness b/l, no edema, cyanosis, 5/5 strength BL, sensation intact, negative straight leg test     NERVOUS SYSTEM: answers questions and follows commands appropriately        ---    CONSULTANTS:     Cardio-Dr. Kurtz    GI-Dr. Hernandez         ---    TIME SPENT:    I, the attending physician, was physically present for the key portions of the evaluation and management (E/M) service provided. The total amount of time spent reviewing the hospital notes, laboratory values, imaging findings, assessing/counseling the patient, discussing with consultant physicians, social work, nursing staff was -- minutes        ---    Primary care provider was made aware of plan for discharge:      [  ] NO     [  ] YES FROM ADMISSION H+P:     HPI:    59 yo male with past medical history of hypertension and h.plyori who presents with complains of intermittent  epigastric pain radiating into his chest for about 1 month and a half. Reports worsening epigastric pain with movement and after eating meals and improvement with surcalfate.  Patient reports he was diagnosed with h.plyori about the same time his symptoms started and was treat his h plyori for 2 weeks by his PCP but has not had follow up testing as of yet. Patient states he came to the ED last night because he was awoken from sleep with pain and a "cold sweat" and a headache . Denies any SOB, dizziness,  new change in chest pain, new radiation to neck or back or arms, denies nausea, new bowel or bladder issues, change in stool, change in weight. Denies any dizziness, lightheadedness, change in vision, palpitations, back pain, difficulty with ambulation         VS in the ED, elevated /100 which improved after receiving Labetalol 10 mg Iv x 1.  Labs notable for neg Troponin x 2, normal BNP,  elevated D-Dimer 373. EKG demonstrated NSR. CT ABD was neg for acute intra-abdominal pathology CT Head was neg.   Was seen by cardiology ( Dr. Kurtz) in the ED, given elevated D-dimer plan to admit for chest pain workup with r/o for MI and possible PE and was started on full dose ASA and Lovenox and amlodipine and metorpolol  while in the ED (23 Aug 2020 07:31)            ---    HOSPITAL COURSE:     Pt was admitted for chest pain and H pylori gastritis. Cardiology Dr. Kurtz was consulted. Cardiac workup-tropinin x2 neg, BNP wnl, ecg neg, TTE was done on 8/25 which showed -----. Pt was monitored on tele and remained stable. Pt had some dizziness with the CP. BLE Doppler was neg for DVT, CTA was neg for PE. Pt was started on Lovenox ppx and aspirin. Pt remained stable during hospital stay. Pt has h/o H pylori and was being treated for it by PCP the last 2 weeks without relief., c/o epigastric pain on admission. Gastroenterology Dr. Hernandez was consulted. Pt was continued on protonix and carafate. EGD was done on 8/25 which showed gastritis. Pt h/o HTN, BP was elevated 169/100 on admission-pt was in hypertensive urgency and got Labetolol x1. CT head was negative. Pt was managed on BP regimen and remained stable. Pt had some lower extremity weakness which was more MSK related/muscle pain-CPK was nl. Symptoms improved once pt started walking around. Pulmonary nodule was found on CT, will f/u outpatient for it.         Patient is stable for discharge as per primary medical team and consultants.        PT consulted, no PT needs        Patient showed improvement throughout hospitalization. Patient was seen and examined on day of discharge. Patient was medically optimized for discharge with close outpatient folllow up.        Vital Signs Last 24 Hrs    T(C): 36.9 (25 Aug 2020 08:25), Max: 36.9 (25 Aug 2020 08:25)    T(F): 98.5 (25 Aug 2020 08:25), Max: 98.5 (25 Aug 2020 08:25)    HR: 59 (25 Aug 2020 08:25) (58 - 63)    BP: 110/69 (25 Aug 2020 08:25) (97/59 - 121/78)    BP(mean): --    RR: 16 (25 Aug 2020 08:25) (16 - 16)    SpO2: 96% (25 Aug 2020 08:25) (95% - 97%)        PHYSICAL EXAM:    GENERAL: NAD, resting comfortably in bed    HEENT:  anicteric, moist mucous membranes    CHEST/LUNG:  CTA b/l, no rales, wheezes, or rhonchi    HEART:  RRR, S1, S2, no murmurs or rubs    ABDOMEN:  BS+ x4, TTP epigastric region, soft, nondistended, no rebound/guarding    EXTREMITIES: no calf tenderness b/l, no edema, cyanosis, 5/5 strength BL, sensation intact, negative straight leg test     NERVOUS SYSTEM: answers questions and follows commands appropriately        ---    CONSULTANTS:     Cardio-Dr. Kurtz    GI-Dr. Hernandez         ---    TIME SPENT:    I, the attending physician, was physically present for the key portions of the evaluation and management (E/M) service provided. The total amount of time spent reviewing the hospital notes, laboratory values, imaging findings, assessing/counseling the patient, discussing with consultant physicians, social work, nursing staff was -- minutes        ---    Primary care provider was made aware of plan for discharge:      [  ] NO     [  ] YES FROM ADMISSION H+P:     HPI:    59 yo male with past medical history of hypertension and h.plyori who presents with complains of intermittent  epigastric pain radiating into his chest for about 1 month and a half. Reports worsening epigastric pain with movement and after eating meals and improvement with surcalfate.  Patient reports he was diagnosed with h.plyori about the same time his symptoms started and was treat his h plyori for 2 weeks by his PCP but has not had follow up testing as of yet. Patient states he came to the ED last night because he was awoken from sleep with pain and a "cold sweat" and a headache . Denies any SOB, dizziness,  new change in chest pain, new radiation to neck or back or arms, denies nausea, new bowel or bladder issues, change in stool, change in weight. Denies any dizziness, lightheadedness, change in vision, palpitations, back pain, difficulty with ambulation         VS in the ED, elevated /100 which improved after receiving Labetalol 10 mg Iv x 1.  Labs notable for neg Troponin x 2, normal BNP,  elevated D-Dimer 373. EKG demonstrated NSR. CT ABD was neg for acute intra-abdominal pathology CT Head was neg.   Was seen by cardiology ( Dr. Kurtz) in the ED, given elevated D-dimer plan to admit for chest pain workup with r/o for MI and possible PE and was started on full dose ASA and Lovenox and amlodipine and metorpolol  while in the ED (23 Aug 2020 07:31)            ---    HOSPITAL COURSE:     Pt was admitted for chest pain and H pylori gastritis. Cardiology Dr. Kurtz was consulted. Cardiac workup-tropinin x2 neg, BNP wnl, ecg neg, TTE was done on 8/25 which was negative for any cardiac structural abnormalities. Pt was monitored on tele and remained stable. Pt had some dizziness with the CP. BLE Doppler was neg for DVT, CTA was neg for PE. Pt was started on Lovenox ppx and aspirin. Pt remained stable during hospital stay. Pt has h/o H pylori and was being treated for it by PCP the last 2 weeks without relief., c/o epigastric pain on admission. Gastroenterology Dr. Hernandez was consulted. Pt was continued on protonix and carafate. EGD was done on 8/25 which showed gastritis. Pt h/o HTN, BP was elevated 169/100 on admission-pt was in hypertensive urgency and got Labetolol x1. CT head was negative. Pt was managed on BP regimen and remained stable. Pt had some lower extremity weakness which was more MSK related/muscle pain-CPK was nl. Symptoms improved once pt started walking around. Pulmonary nodule was found on CT, will f/u outpatient for it.         Patient is stable for discharge as per primary medical team and consultants.        PT consulted, no PT needs        Patient showed improvement throughout hospitalization. Patient was seen and examined on day of discharge. Patient was medically optimized for discharge with close outpatient folllow up.        Vital Signs Last 24 Hrs    T(C): 36.9 (25 Aug 2020 08:25), Max: 36.9 (25 Aug 2020 08:25)    T(F): 98.5 (25 Aug 2020 08:25), Max: 98.5 (25 Aug 2020 08:25)    HR: 59 (25 Aug 2020 08:25) (58 - 63)    BP: 110/69 (25 Aug 2020 08:25) (97/59 - 121/78)    BP(mean): --    RR: 16 (25 Aug 2020 08:25) (16 - 16)    SpO2: 96% (25 Aug 2020 08:25) (95% - 97%)        PHYSICAL EXAM:    GENERAL: NAD, resting comfortably in bed    HEENT:  anicteric, moist mucous membranes    CHEST/LUNG:  CTA b/l, no rales, wheezes, or rhonchi    HEART:  RRR, S1, S2, no murmurs or rubs    ABDOMEN:  BS+ x4, TTP epigastric region, soft, nondistended, no rebound/guarding    EXTREMITIES: no calf tenderness b/l, no edema, cyanosis, 5/5 strength BL, sensation intact, negative straight leg test     NERVOUS SYSTEM: answers questions and follows commands appropriately        ---    CONSULTANTS:     Cardio-Dr. Kurtz    GI-Dr. Hernandez         ---    TIME SPENT:    I, the attending physician, was physically present for the key portions of the evaluation and management (E/M) service provided. The total amount of time spent reviewing the hospital notes, laboratory values, imaging findings, assessing/counseling the patient, discussing with consultant physicians, social work, nursing staff was -- minutes        ---    Primary care provider was made aware of plan for discharge:      [  ] NO     [  ] YES FROM ADMISSION H+P:     HPI:    61 yo male with past medical history of hypertension and h.plyori who presents with complains of intermittent  epigastric pain radiating into his chest for about 1 month and a half. Reports worsening epigastric pain with movement and after eating meals and improvement with surcalfate.  Patient reports he was diagnosed with h.plyori about the same time his symptoms started and was treat his h plyori for 2 weeks by his PCP but has not had follow up testing as of yet. Patient states he came to the ED last night because he was awoken from sleep with pain and a "cold sweat" and a headache . Denies any SOB, dizziness,  new change in chest pain, new radiation to neck or back or arms, denies nausea, new bowel or bladder issues, change in stool, change in weight. Denies any dizziness, lightheadedness, change in vision, palpitations, back pain, difficulty with ambulation         VS in the ED, elevated /100 which improved after receiving Labetalol 10 mg Iv x 1.  Labs notable for neg Troponin x 2, normal BNP,  elevated D-Dimer 373. EKG demonstrated NSR. CT ABD was neg for acute intra-abdominal pathology CT Head was neg.   Was seen by cardiology ( Dr. Kurtz) in the ED, given elevated D-dimer plan to admit for chest pain workup with r/o for MI and possible PE and was started on full dose ASA and Lovenox and amlodipine and metorpolol  while in the ED (23 Aug 2020 07:31)            ---    HOSPITAL COURSE:     Pt was admitted for chest pain and H pylori gastritis. Cardiology Dr. Kurtz was consulted. Cardiac workup-tropinin x2 neg, BNP wnl, ecg neg, TTE was done on 8/25 which was negative for any cardiac structural abnormalities. Pt was monitored on tele and remained stable. Pt had some dizziness with the CP. BLE Doppler was neg for DVT, CTA was neg for PE. Pt was started on Lovenox ppx and aspirin. Pt remained stable during hospital stay. Pt has h/o H pylori and was being treated for it by PCP the last 2 weeks without relief., c/o epigastric pain on admission. Gastroenterology Dr. Hernandez was consulted. Pt was continued on protonix and carafate. EGD was done on 8/25 which showed gastritis. Pt h/o HTN, BP was elevated 169/100 on admission-pt was in hypertensive urgency and got Labetolol x1. CT head was negative. Pt was managed on BP regimen and remained stable. Pt had some lower extremity weakness which was more MSK related/muscle pain-CPK was nl. Symptoms improved once pt started walking around. Pulmonary nodule was found on CT, will f/u outpatient for it.         Patient is stable for discharge as per primary medical team and consultants.        PT consulted, no PT needs        Patient showed improvement throughout hospitalization. Patient was seen and examined on day of discharge. Patient was medically optimized for discharge with close outpatient folllow up.        Vital Signs Last 24 Hrs    T(C): 36.9 (25 Aug 2020 08:25), Max: 36.9 (25 Aug 2020 08:25)    T(F): 98.5 (25 Aug 2020 08:25), Max: 98.5 (25 Aug 2020 08:25)    HR: 59 (25 Aug 2020 08:25) (58 - 63)    BP: 110/69 (25 Aug 2020 08:25) (97/59 - 121/78)    BP(mean): --    RR: 16 (25 Aug 2020 08:25) (16 - 16)    SpO2: 96% (25 Aug 2020 08:25) (95% - 97%)        PHYSICAL EXAM:    GENERAL: NAD, resting comfortably in bed    HEENT:  anicteric, moist mucous membranes    CHEST/LUNG:  CTA b/l, no rales, wheezes, or rhonchi    HEART:  RRR, S1, S2, no murmurs or rubs    ABDOMEN:  BS+ x4, TTP epigastric region, soft, nondistended, no rebound/guarding    EXTREMITIES: no calf tenderness b/l, no edema, cyanosis, 5/5 strength BL, sensation intact, negative straight leg test     NERVOUS SYSTEM: answers questions and follows commands appropriately        ---    CONSULTANTS:     Cardio-Dr. Kurtz    GI-Dr. Hernandez         ---    TIME SPENT:    I, the attending physician, was physically present for the key portions of the evaluation and management (E/M) service provided. The total amount of time spent reviewing the hospital notes, laboratory values, imaging findings, assessing/counseling the patient, discussing with consultant physicians, social work, nursing staff was >>>30 minutes        ---    Primary care provider was made aware of plan for discharge:      [  x] NO     [  ] YES

## 2020-08-24 NOTE — PROGRESS NOTE ADULT - SUBJECTIVE AND OBJECTIVE BOX
INTERVAL HPI/OVERNIGHT EVENTS:  pt seen and examined  c/o epigastric pain and constipation  had small brown bm this am  denies n/v    MEDICATIONS  (STANDING):  amLODIPine   Tablet 10 milliGRAM(s) Oral daily  aspirin  chewable 81 milliGRAM(s) Oral daily  enoxaparin Injectable 40 milliGRAM(s) SubCutaneous daily  metoprolol succinate ER 25 milliGRAM(s) Oral daily  pantoprazole    Tablet 40 milliGRAM(s) Oral before breakfast  sucralfate suspension 1 Gram(s) Oral four times a day    MEDICATIONS  (PRN):      Allergies    No Known Allergies    Intolerances        Review of Systems:    General:  No wt loss, fevers, chills, night sweats, fatigue   Eyes:  Good vision, no reported pain  ENT:  No sore throat, pain, runny nose, dysphagia  CV:  No pain, palpitations, hypo/hypertension  Resp:  No dyspnea, cough, tachypnea, wheezing  GI:  see above  :  No pain, bleeding, incontinence, nocturia  Muscle:  No pain, weakness  Neuro:  No weakness, tingling, memory problems  Psych:  No fatigue, insomnia, mood problems, depression  Endocrine:  No polyuria, polydypsia, cold/heat intolerance  Heme:  No petechiae, ecchymosis, easy bruisability  Skin:  No rash, tattoos, scars, edema      Vital Signs Last 24 Hrs  T(C): 36.9 (24 Aug 2020 07:35), Max: 36.9 (23 Aug 2020 14:22)  T(F): 98.4 (24 Aug 2020 07:35), Max: 98.5 (23 Aug 2020 14:22)  HR: 53 (24 Aug 2020 07:35) (53 - 97)  BP: 115/75 (24 Aug 2020 07:35) (113/72 - 118/75)  BP(mean): --  RR: 16 (24 Aug 2020 07:35) (15 - 16)  SpO2: 95% (24 Aug 2020 07:35) (95% - 97%)    PHYSICAL EXAM:    Constitutional: lying in bed  HEENT: ncat  Neck: No LAD  Gastrointestinal: soft ttp epigastric region nd  Extremities: No peripheral edema  Vascular: + peripheral pulses  Neurological: A/O x 3    LABS:                        14.6   4.49  )-----------( 200      ( 24 Aug 2020 06:57 )             41.3     08-24    141  |  109<H>  |  12  ----------------------------<  97  4.2   |  29  |  0.92    Ca    9.7      24 Aug 2020 06:57  Mg     2.2     08-24    TPro  7.5  /  Alb  4.2  /  TBili  1.3<H>  /  DBili  x   /  AST  19  /  ALT  25  /  AlkPhos  111  08-22    PT/INR - ( 22 Aug 2020 23:45 )   PT: 12.5 sec;   INR: 1.07 ratio         PTT - ( 22 Aug 2020 23:45 )  PTT:25.0 sec      RADIOLOGY & ADDITIONAL TESTS:

## 2020-08-24 NOTE — PHYSICAL THERAPY INITIAL EVALUATION ADULT - PERTINENT HX OF CURRENT PROBLEM, REHAB EVAL
Pt admitted to [facility] due to chest pain. CT was (-) for PE, US was (-) for DVT, CT head was (-), and ECG was normal.

## 2020-08-24 NOTE — DISCHARGE NOTE PROVIDER - NSDCFUSCHEDAPPT_GEN_ALL_CORE_FT
YANNA KINNEY ; 09/08/2020 ; [facility] ; 09/08/2020 ; [facility] ; 09/08/2020 ; [facility] ; 09/08/2020 ; [facility] ; 09/08/2020 ; [facility] ; 09/08/2020 ; [facility] ; 09/08/2020 ; [facility] ; 09/08/2020 ; [facility]

## 2020-08-24 NOTE — PROGRESS NOTE ADULT - PROBLEM SELECTOR PLAN 4
Pulmonary nodules on CT, will need outpatient follow up -Pulmonary nodules on CT, will need outpatient follow up BL lower leg weakness, more msk, +calf pain, no bowel or bladder incontinence  -CPK ordered, f/u results  -PT consulted-f/u recs BL lower leg weakness, more msk, +calf pain, no bowel or bladder incontinence  -CPK 82  -PT consulted-f/u recs

## 2020-08-24 NOTE — PROGRESS NOTE ADULT - SUBJECTIVE AND OBJECTIVE BOX
Patient is a 60y old  Male who presents with a chief complaint of Chest Pain, R/O MI (23 Aug 2020 13:27)      INTERVAL HPI/OVERNIGHT EVENTS:    MEDICATIONS  (STANDING):  amLODIPine   Tablet 10 milliGRAM(s) Oral daily  aspirin  chewable 81 milliGRAM(s) Oral daily  enoxaparin Injectable 40 milliGRAM(s) SubCutaneous daily  metoprolol succinate ER 25 milliGRAM(s) Oral daily  pantoprazole    Tablet 40 milliGRAM(s) Oral before breakfast  sucralfate suspension 1 Gram(s) Oral four times a day    MEDICATIONS  (PRN):      Allergies    No Known Allergies    Intolerances        REVIEW OF SYSTEMS:  CONSTITUTIONAL: No fever or chills  HEENT:  No headache, no sore throat  RESPIRATORY: No cough, wheezing, or shortness of breath  CARDIOVASCULAR: No chest pain, palpitations  GASTROINTESTINAL: No abd pain, nausea, vomiting, or diarrhea  GENITOURINARY: No dysuria, frequency, or hematuria  NEUROLOGICAL: no focal weakness or dizziness  MUSCULOSKELETAL: no myalgias     Vital Signs Last 24 Hrs  T(C): 36.9 (24 Aug 2020 07:35), Max: 36.9 (23 Aug 2020 14:22)  T(F): 98.4 (24 Aug 2020 07:35), Max: 98.5 (23 Aug 2020 14:22)  HR: 53 (24 Aug 2020 07:35) (53 - 97)  BP: 115/75 (24 Aug 2020 07:35) (113/72 - 118/75)  BP(mean): --  RR: 16 (24 Aug 2020 07:35) (14 - 16)  SpO2: 95% (24 Aug 2020 07:35) (95% - 100%)    PHYSICAL EXAM:  GENERAL: NAD  HEENT:  anicteric, moist mucous membranes  CHEST/LUNG:  CTA b/l, no rales, wheezes, or rhonchi  HEART:  RRR, S1, S2  ABDOMEN:  BS+, soft, nontender, nondistended  EXTREMITIES: no edema, cyanosis, or calf tenderness  NERVOUS SYSTEM: answers questions and follows commands appropriately    LABS:                        14.6   4.49  )-----------( 200      ( 24 Aug 2020 06:57 )             41.3     CBC Full  -  ( 24 Aug 2020 06:57 )  WBC Count : 4.49 K/uL  Hemoglobin : 14.6 g/dL  Hematocrit : 41.3 %  Platelet Count - Automated : 200 K/uL  Mean Cell Volume : 95.6 fl  Mean Cell Hemoglobin : 33.8 pg  Mean Cell Hemoglobin Concentration : 35.4 gm/dL  Auto Neutrophil # : x  Auto Lymphocyte # : x  Auto Monocyte # : x  Auto Eosinophil # : x  Auto Basophil # : x  Auto Neutrophil % : x  Auto Lymphocyte % : x  Auto Monocyte % : x  Auto Eosinophil % : x  Auto Basophil % : x    24 Aug 2020 06:57    141    |  109    |  12     ----------------------------<  97     4.2     |  29     |  0.92     Ca    9.7        24 Aug 2020 06:57  Mg     2.2       24 Aug 2020 06:57      PT/INR - ( 22 Aug 2020 23:45 )   PT: 12.5 sec;   INR: 1.07 ratio         PTT - ( 22 Aug 2020 23:45 )  PTT:25.0 sec    CAPILLARY BLOOD GLUCOSE              RADIOLOGY & ADDITIONAL TESTS:    Personally reviewed.     Consultant(s) Notes Reviewed:  [x] YES  [ ] NO Patient is a 60y old Male who presents with a chief complaint of Chest Pain, R/O MI (23 Aug 2020 13:27)      INTERVAL HPI/OVERNIGHT EVENTS: Pt seen and examined at bedside. Reports an episode of chest pain and dizziness this morning which had resolved at the time of exam.     MEDICATIONS  (STANDING):  amLODIPine   Tablet 10 milliGRAM(s) Oral daily  aspirin  chewable 81 milliGRAM(s) Oral daily  enoxaparin Injectable 40 milliGRAM(s) SubCutaneous daily  metoprolol succinate ER 25 milliGRAM(s) Oral daily  pantoprazole    Tablet 40 milliGRAM(s) Oral before breakfast  sucralfate suspension 1 Gram(s) Oral four times a day    MEDICATIONS  (PRN):      Allergies    No Known Allergies    Intolerances        REVIEW OF SYSTEMS:  CONSTITUTIONAL: Admits dizziness, no fever or chills  HEENT:  Admits headache, no sore throat  RESPIRATORY: No cough, wheezing, or shortness of breath  CARDIOVASCULAR: Admits chest pain, no palpitations  GASTROINTESTINAL: Admits epigastric abd pain, no nausea, vomiting, or diarrhea  GENITOURINARY: No dysuria, frequency, or hematuria  NEUROLOGICAL: No focal weakness or dizziness  MUSCULOSKELETAL: No myalgias     Vital Signs Last 24 Hrs  T(C): 36.9 (24 Aug 2020 07:35), Max: 36.9 (23 Aug 2020 14:22)  T(F): 98.4 (24 Aug 2020 07:35), Max: 98.5 (23 Aug 2020 14:22)  HR: 53 (24 Aug 2020 07:35) (53 - 97)  BP: 115/75 (24 Aug 2020 07:35) (113/72 - 118/75)  BP(mean): --  RR: 16 (24 Aug 2020 07:35) (14 - 16)  SpO2: 95% (24 Aug 2020 07:35) (95% - 100%)    PHYSICAL EXAM:  GENERAL: NAD, resting comfortably in bed  HEENT:  anicteric, moist mucous membranes  CHEST/LUNG:  CTA b/l, no rales, wheezes, or rhonchi  HEART:  RRR, S1, S2, no murmurs or rubs  ABDOMEN:  BS+ x4, TTP epigastric region, soft, nondistended  EXTREMITIES: no edema, cyanosis, or calf tenderness  NERVOUS SYSTEM: answers questions and follows commands appropriately    LABS:                        14.6   4.49  )-----------( 200      ( 24 Aug 2020 06:57 )             41.3     CBC Full  -  ( 24 Aug 2020 06:57 )  WBC Count : 4.49 K/uL  Hemoglobin : 14.6 g/dL  Hematocrit : 41.3 %  Platelet Count - Automated : 200 K/uL  Mean Cell Volume : 95.6 fl  Mean Cell Hemoglobin : 33.8 pg  Mean Cell Hemoglobin Concentration : 35.4 gm/dL  Auto Neutrophil # : x  Auto Lymphocyte # : x  Auto Monocyte # : x  Auto Eosinophil # : x  Auto Basophil # : x  Auto Neutrophil % : x  Auto Lymphocyte % : x  Auto Monocyte % : x  Auto Eosinophil % : x  Auto Basophil % : x    24 Aug 2020 06:57    141    |  109    |  12     ----------------------------<  97     4.2     |  29     |  0.92     Ca    9.7        24 Aug 2020 06:57  Mg     2.2       24 Aug 2020 06:57      PT/INR - ( 22 Aug 2020 23:45 )   PT: 12.5 sec;   INR: 1.07 ratio         PTT - ( 22 Aug 2020 23:45 )  PTT:25.0 sec    CAPILLARY BLOOD GLUCOSE              RADIOLOGY & ADDITIONAL TESTS:    Personally reviewed.     Consultant(s) Notes Reviewed:  [x] YES  [ ] NO Patient is a 60y old Male who presents with a chief complaint of Chest Pain, R/O MI (23 Aug 2020 13:27)      INTERVAL HPI/OVERNIGHT EVENTS: Pt seen and examined at bedside. Admitted yesterday for chest pain and hypertensive urgency. Reports an episode of chest pain and dizziness this morning which had resolved at the time of exam. Currently c/o epigastric abdominal pain and headache, denies N/V, diaphoresis, and palpitations. BP currently stable, 115/70.     MEDICATIONS  (STANDING):  amLODIPine   Tablet 10 milliGRAM(s) Oral daily  aspirin  chewable 81 milliGRAM(s) Oral daily  enoxaparin Injectable 40 milliGRAM(s) SubCutaneous daily  metoprolol succinate ER 25 milliGRAM(s) Oral daily  pantoprazole    Tablet 40 milliGRAM(s) Oral before breakfast  sucralfate suspension 1 Gram(s) Oral four times a day    MEDICATIONS  (PRN):      Allergies    No Known Allergies    Intolerances        REVIEW OF SYSTEMS:  CONSTITUTIONAL: Admits dizziness, no fever or chills  HEENT:  Admits headache, no sore throat  RESPIRATORY: No cough, wheezing, or shortness of breath  CARDIOVASCULAR: Admits chest pain, no palpitations  GASTROINTESTINAL: Admits epigastric abd pain, no nausea, vomiting, or diarrhea  GENITOURINARY: No dysuria, frequency, or hematuria  NEUROLOGICAL: No focal weakness or dizziness  MUSCULOSKELETAL: No myalgias     Vital Signs Last 24 Hrs  T(C): 36.9 (24 Aug 2020 07:35), Max: 36.9 (23 Aug 2020 14:22)  T(F): 98.4 (24 Aug 2020 07:35), Max: 98.5 (23 Aug 2020 14:22)  HR: 53 (24 Aug 2020 07:35) (53 - 97)  BP: 115/75 (24 Aug 2020 07:35) (113/72 - 118/75)  BP(mean): --  RR: 16 (24 Aug 2020 07:35) (14 - 16)  SpO2: 95% (24 Aug 2020 07:35) (95% - 100%)    PHYSICAL EXAM:  GENERAL: NAD, resting comfortably in bed  HEENT:  anicteric, moist mucous membranes  CHEST/LUNG:  CTA b/l, no rales, wheezes, or rhonchi  HEART:  RRR, S1, S2, no murmurs or rubs  ABDOMEN:  BS+ x4, TTP epigastric region, soft, nondistended  EXTREMITIES: no edema, cyanosis, or calf tenderness  NERVOUS SYSTEM: answers questions and follows commands appropriately    LABS:                        14.6   4.49  )-----------( 200      ( 24 Aug 2020 06:57 )             41.3     CBC Full  -  ( 24 Aug 2020 06:57 )  WBC Count : 4.49 K/uL  Hemoglobin : 14.6 g/dL  Hematocrit : 41.3 %  Platelet Count - Automated : 200 K/uL  Mean Cell Volume : 95.6 fl  Mean Cell Hemoglobin : 33.8 pg  Mean Cell Hemoglobin Concentration : 35.4 gm/dL  Auto Neutrophil # : x  Auto Lymphocyte # : x  Auto Monocyte # : x  Auto Eosinophil # : x  Auto Basophil # : x  Auto Neutrophil % : x  Auto Lymphocyte % : x  Auto Monocyte % : x  Auto Eosinophil % : x  Auto Basophil % : x    24 Aug 2020 06:57    141    |  109    |  12     ----------------------------<  97     4.2     |  29     |  0.92     Ca    9.7        24 Aug 2020 06:57  Mg     2.2       24 Aug 2020 06:57      PT/INR - ( 22 Aug 2020 23:45 )   PT: 12.5 sec;   INR: 1.07 ratio         PTT - ( 22 Aug 2020 23:45 )  PTT:25.0 sec    CAPILLARY BLOOD GLUCOSE              RADIOLOGY & ADDITIONAL TESTS:    Personally reviewed.     Consultant(s) Notes Reviewed:  [x] YES  [ ] NO Patient is a 60y old Male who presents with a chief complaint of Chest Pain, R/O MI (23 Aug 2020 13:27)      INTERVAL HPI/OVERNIGHT EVENTS: Pt seen and examined at bedside. Admitted yesterday for chest pain and hypertensive urgency. Reports an episode of chest pain and dizziness this morning which had resolved at the time of exam. Currently c/o epigastric abdominal pain and headache, denies N/V, diaphoresis, and palpitations. BP currently stable, 115/70.     MEDICATIONS  (STANDING):  amLODIPine   Tablet 10 milliGRAM(s) Oral daily  aspirin  chewable 81 milliGRAM(s) Oral daily  enoxaparin Injectable 40 milliGRAM(s) SubCutaneous daily  metoprolol succinate ER 25 milliGRAM(s) Oral daily  pantoprazole    Tablet 40 milliGRAM(s) Oral before breakfast  sucralfate suspension 1 Gram(s) Oral four times a day    MEDICATIONS  (PRN):      Allergies    No Known Allergies    Intolerances        REVIEW OF SYSTEMS:  CONSTITUTIONAL: Admits dizziness, no fever or chills  HEENT:  Admits headache, no sore throat  RESPIRATORY: No cough, wheezing, or shortness of breath  CARDIOVASCULAR: Admits chest pain, no palpitations  GASTROINTESTINAL: Admits epigastric abd pain, no nausea, vomiting, or diarrhea  GENITOURINARY: No dysuria, frequency, or hematuria  NEUROLOGICAL: No focal weakness or dizziness  MUSCULOSKELETAL: Admits leg weakness, no myalgias     Vital Signs Last 24 Hrs  T(C): 36.9 (24 Aug 2020 07:35), Max: 36.9 (23 Aug 2020 14:22)  T(F): 98.4 (24 Aug 2020 07:35), Max: 98.5 (23 Aug 2020 14:22)  HR: 53 (24 Aug 2020 07:35) (53 - 97)  BP: 115/75 (24 Aug 2020 07:35) (113/72 - 118/75)  BP(mean): --  RR: 16 (24 Aug 2020 07:35) (14 - 16)  SpO2: 95% (24 Aug 2020 07:35) (95% - 100%)    PHYSICAL EXAM:  GENERAL: NAD, resting comfortably in bed  HEENT:  anicteric, moist mucous membranes  CHEST/LUNG:  CTA b/l, no rales, wheezes, or rhonchi  HEART:  RRR, S1, S2, no murmurs or rubs  ABDOMEN:  BS+ x4, TTP epigastric region, soft, nondistended  EXTREMITIES: mild calf tenderness b/l, no edema, cyanosis  NERVOUS SYSTEM: answers questions and follows commands appropriately    LABS:                        14.6   4.49  )-----------( 200      ( 24 Aug 2020 06:57 )             41.3     CBC Full  -  ( 24 Aug 2020 06:57 )  WBC Count : 4.49 K/uL  Hemoglobin : 14.6 g/dL  Hematocrit : 41.3 %  Platelet Count - Automated : 200 K/uL  Mean Cell Volume : 95.6 fl  Mean Cell Hemoglobin : 33.8 pg  Mean Cell Hemoglobin Concentration : 35.4 gm/dL  Auto Neutrophil # : x  Auto Lymphocyte # : x  Auto Monocyte # : x  Auto Eosinophil # : x  Auto Basophil # : x  Auto Neutrophil % : x  Auto Lymphocyte % : x  Auto Monocyte % : x  Auto Eosinophil % : x  Auto Basophil % : x    24 Aug 2020 06:57    141    |  109    |  12     ----------------------------<  97     4.2     |  29     |  0.92     Ca    9.7        24 Aug 2020 06:57  Mg     2.2       24 Aug 2020 06:57      PT/INR - ( 22 Aug 2020 23:45 )   PT: 12.5 sec;   INR: 1.07 ratio         PTT - ( 22 Aug 2020 23:45 )  PTT:25.0 sec    CAPILLARY BLOOD GLUCOSE      RADIOLOGY & ADDITIONAL TESTS:  < from: CT Angio Chest w/ IV Cont (08.24.20 @ 09:53) >  EXAM:  CT ANGIO CHEST (W)AW IC                            FINDINGS:    LUNGS AND AIRWAYS: Patent central airways.5.3 mm right lower lobe nodule (2:69). 3 mm right middle lobe calcified granuloma. 7.1 mm right middle lobe nodule (2:57). 7.4 mm right middle lobe nodule adjacent to a pulmonary venous branch (2:55). 4.4 mm right upper lobe nodule (3:101). 6 mm lingular nodule (3:293). 6 mm left lower lobe nodule (3:313). Mild bibasilar dependent changes.  PLEURA: No pleural effusion.  MEDIASTINUM AND GITA: No lymphadenopathy.  VESSELS: No evidence of pulmonary embolism.  HEART: Heart size is normal. No pericardial effusion.  CHEST WALL AND LOWER NECK: Within normal limits.  VISUALIZED UPPER ABDOMEN: Within normal limits.  BONES: Within normal limits.    IMPRESSION:  Multiple bilateral lung nodules indeterminate for metastatic disease.    No evidence of pulmonary embolism.    < end of copied text >      < from: US Duplex Venous Lower Ext Complete, Bilateral (08.23.20 @ 12:45) >  EXAM:  US DPLX LWR EXT VEINS COMPL BI                          TECHNIQUE: Duplex sonography of the BILATERAL LOWER extremity veins with color and spectral Doppler, with and without compression.    FINDINGS:    There is normal compressibility of the bilateral common femoral, femoral and popliteal veins.  Doppler examination shows normal spontaneous and phasic flow.    No calf vein thrombosis is detected.    IMPRESSION:  No evidence of deep venous thrombosis in either lower extremity.    < end of copied text >    < from: CT Abdomen and Pelvis w/ Oral Cont and w/ IV Cont (08.23.20 @ 06:12) >  EXAM:  CT ABDOMEN AND PELVIS OC IC    FINDINGS:  LIMITATIONS: None.    LOWER CHEST: Several pulmonary nodules measuring up to 8 mm in the left lower lobe. This is stable. Others were not included in the field of view previously.  ABDOMINAL WALL: Within normal limits.  VESSELS: Within normal limits.  BOWEL: No evidence of obstruction. Normal distal esophagus, stomach and duodenum. No gross abnormalities of the colon. Appendix normal.    LIVER: Within normal limits.  BILE DUCTS: Normal caliber  GALLBLADDER: Within normal limits.  SPLEEN: Within normal limits.  PANCREAS: Within normal limits.  ADRENALS: Within normal limits.  KIDNEYS/URETERS: Within normal limits.    BLADDER: Within normal limits.  REPRODUCTIVE ORGANS: Within normal limits.    PERITONEUM: No ascites.  RETROPERITONEUM/LYMPH NODES: No lymphadenopathy.    BONES: No acute osseous pathology.    IMPRESSION:  No evidence of acute intra-abdominal pathology. The largest pulmonary nodule appears stable. Additional nodules are less than 6 mm and were not previously included in the field-of-view; if the patient is not at high risk for malignancy, no follow-up is necessary.    < end of copied text >    Personally reviewed.     Consultant(s) Notes Reviewed:  [x] YES  [ ] NO Patient is a 60y old Male who presents with a chief complaint of Chest Pain, R/O MI (23 Aug 2020 13:27)      INTERVAL HPI/OVERNIGHT EVENTS: Pt seen and examined at bedside. Admitted yesterday for chest pain and hypertensive urgency. Reports an episode of chest pain and dizziness this morning which had resolved at the time of exam. Pt also complains BL leg weakness, BL calf pain most probably muscular. Denies back pain, bowel or bladder incontinence, no numbness/tingling. Currently c/o epigastric abdominal pain and headache, denies N/V, diaphoresis, and palpitations. BP currently stable, 115/70.     MEDICATIONS  (STANDING):  amLODIPine   Tablet 10 milliGRAM(s) Oral daily  aspirin  chewable 81 milliGRAM(s) Oral daily  enoxaparin Injectable 40 milliGRAM(s) SubCutaneous daily  metoprolol succinate ER 25 milliGRAM(s) Oral daily  pantoprazole    Tablet 40 milliGRAM(s) Oral before breakfast  sucralfate suspension 1 Gram(s) Oral four times a day    MEDICATIONS  (PRN):      Allergies    No Known Allergies    Intolerances        REVIEW OF SYSTEMS:  CONSTITUTIONAL: +dizziness-resolved, no fever or chills  HEENT:  + headache, no sore throat  RESPIRATORY: No cough, wheezing, or shortness of breath  CARDIOVASCULAR: Admits chest pain, no palpitations  GASTROINTESTINAL: + epigastric abd pain, no nausea, vomiting, or diarrhea  GENITOURINARY: No dysuria, frequency, or hematuria  NEUROLOGICAL: No focal weakness or dizziness  MUSCULOSKELETAL: + leg weakness/MSK pain, no myalgias     Vital Signs Last 24 Hrs  T(C): 36.9 (24 Aug 2020 07:35), Max: 36.9 (23 Aug 2020 14:22)  T(F): 98.4 (24 Aug 2020 07:35), Max: 98.5 (23 Aug 2020 14:22)  HR: 53 (24 Aug 2020 07:35) (53 - 97)  BP: 115/75 (24 Aug 2020 07:35) (113/72 - 118/75)  BP(mean): --  RR: 16 (24 Aug 2020 07:35) (14 - 16)  SpO2: 95% (24 Aug 2020 07:35) (95% - 100%)    PHYSICAL EXAM:  GENERAL: NAD, resting comfortably in bed  HEENT:  anicteric, moist mucous membranes  CHEST/LUNG:  CTA b/l, no rales, wheezes, or rhonchi  HEART:  RRR, S1, S2, no murmurs or rubs  ABDOMEN:  BS+ x4, TTP epigastric region, soft, nondistended, no rebound/guarding  EXTREMITIES: mild calf tenderness b/l, no edema, cyanosis, 5/5 strength BL, sensation intact, negative straight leg test   NERVOUS SYSTEM: answers questions and follows commands appropriately    LABS:                        14.6   4.49  )-----------( 200      ( 24 Aug 2020 06:57 )             41.3     CBC Full  -  ( 24 Aug 2020 06:57 )  WBC Count : 4.49 K/uL  Hemoglobin : 14.6 g/dL  Hematocrit : 41.3 %  Platelet Count - Automated : 200 K/uL  Mean Cell Volume : 95.6 fl  Mean Cell Hemoglobin : 33.8 pg  Mean Cell Hemoglobin Concentration : 35.4 gm/dL  Auto Neutrophil # : x  Auto Lymphocyte # : x  Auto Monocyte # : x  Auto Eosinophil # : x  Auto Basophil # : x  Auto Neutrophil % : x  Auto Lymphocyte % : x  Auto Monocyte % : x  Auto Eosinophil % : x  Auto Basophil % : x    24 Aug 2020 06:57    141    |  109    |  12     ----------------------------<  97     4.2     |  29     |  0.92     Ca    9.7        24 Aug 2020 06:57  Mg     2.2       24 Aug 2020 06:57      PT/INR - ( 22 Aug 2020 23:45 )   PT: 12.5 sec;   INR: 1.07 ratio         PTT - ( 22 Aug 2020 23:45 )  PTT:25.0 sec    CAPILLARY BLOOD GLUCOSE      RADIOLOGY & ADDITIONAL TESTS:  < from: CT Angio Chest w/ IV Cont (08.24.20 @ 09:53) >  EXAM:  CT ANGIO CHEST (W)AW IC                            FINDINGS:    LUNGS AND AIRWAYS: Patent central airways.5.3 mm right lower lobe nodule (2:69). 3 mm right middle lobe calcified granuloma. 7.1 mm right middle lobe nodule (2:57). 7.4 mm right middle lobe nodule adjacent to a pulmonary venous branch (2:55). 4.4 mm right upper lobe nodule (3:101). 6 mm lingular nodule (3:293). 6 mm left lower lobe nodule (3:313). Mild bibasilar dependent changes.  PLEURA: No pleural effusion.  MEDIASTINUM AND GITA: No lymphadenopathy.  VESSELS: No evidence of pulmonary embolism.  HEART: Heart size is normal. No pericardial effusion.  CHEST WALL AND LOWER NECK: Within normal limits.  VISUALIZED UPPER ABDOMEN: Within normal limits.  BONES: Within normal limits.    IMPRESSION:  Multiple bilateral lung nodules indeterminate for metastatic disease.    No evidence of pulmonary embolism.    < end of copied text >      < from: US Duplex Venous Lower Ext Complete, Bilateral (08.23.20 @ 12:45) >  EXAM:  US DPLX LWR EXT VEINS COMPL BI                          TECHNIQUE: Duplex sonography of the BILATERAL LOWER extremity veins with color and spectral Doppler, with and without compression.    FINDINGS:    There is normal compressibility of the bilateral common femoral, femoral and popliteal veins.  Doppler examination shows normal spontaneous and phasic flow.    No calf vein thrombosis is detected.    IMPRESSION:  No evidence of deep venous thrombosis in either lower extremity.    < end of copied text >    < from: CT Abdomen and Pelvis w/ Oral Cont and w/ IV Cont (08.23.20 @ 06:12) >  EXAM:  CT ABDOMEN AND PELVIS OC IC    FINDINGS:  LIMITATIONS: None.    LOWER CHEST: Several pulmonary nodules measuring up to 8 mm in the left lower lobe. This is stable. Others were not included in the field of view previously.  ABDOMINAL WALL: Within normal limits.  VESSELS: Within normal limits.  BOWEL: No evidence of obstruction. Normal distal esophagus, stomach and duodenum. No gross abnormalities of the colon. Appendix normal.    LIVER: Within normal limits.  BILE DUCTS: Normal caliber  GALLBLADDER: Within normal limits.  SPLEEN: Within normal limits.  PANCREAS: Within normal limits.  ADRENALS: Within normal limits.  KIDNEYS/URETERS: Within normal limits.    BLADDER: Within normal limits.  REPRODUCTIVE ORGANS: Within normal limits.    PERITONEUM: No ascites.  RETROPERITONEUM/LYMPH NODES: No lymphadenopathy.    BONES: No acute osseous pathology.    IMPRESSION:  No evidence of acute intra-abdominal pathology. The largest pulmonary nodule appears stable. Additional nodules are less than 6 mm and were not previously included in the field-of-view; if the patient is not at high risk for malignancy, no follow-up is necessary.    < end of copied text >    Personally reviewed.     Consultant(s) Notes Reviewed:  [x] YES  [ ] NO Patient is a 60y old Male who presents with a chief complaint of Chest Pain, R/O MI (23 Aug 2020 13:27)      INTERVAL HPI/OVERNIGHT EVENTS: Pt seen and examined at bedside. Admitted yesterday for chest pain and hypertensive urgency. Reports an episode of chest pain and dizziness this morning which had resolved at the time of exam. Pt also complains BL leg weakness- more of BL calf pain most probably muscular. Denies back pain, bowel or bladder incontinence, no numbness/tingling. Currently c/o epigastric abdominal pain and headache, denies N/V, diaphoresis, and palpitations. BP currently stable, 115/70.     MEDICATIONS  (STANDING):  amLODIPine   Tablet 10 milliGRAM(s) Oral daily  aspirin  chewable 81 milliGRAM(s) Oral daily  enoxaparin Injectable 40 milliGRAM(s) SubCutaneous daily  metoprolol succinate ER 25 milliGRAM(s) Oral daily  pantoprazole    Tablet 40 milliGRAM(s) Oral before breakfast  sucralfate suspension 1 Gram(s) Oral four times a day    MEDICATIONS  (PRN):      Allergies    No Known Allergies    Intolerances        REVIEW OF SYSTEMS:  CONSTITUTIONAL: +dizziness-resolved, no fever or chills  HEENT:  + headache, no sore throat  RESPIRATORY: No cough, wheezing, or shortness of breath  CARDIOVASCULAR: Admits chest pain, no palpitations  GASTROINTESTINAL: + epigastric abd pain, no nausea, vomiting, or diarrhea  GENITOURINARY: No dysuria, frequency, or hematuria  NEUROLOGICAL: No focal weakness or dizziness  MUSCULOSKELETAL: + leg weakness/MSK calf pain, no myalgias     Vital Signs Last 24 Hrs  T(C): 36.9 (24 Aug 2020 07:35), Max: 36.9 (23 Aug 2020 14:22)  T(F): 98.4 (24 Aug 2020 07:35), Max: 98.5 (23 Aug 2020 14:22)  HR: 53 (24 Aug 2020 07:35) (53 - 97)  BP: 115/75 (24 Aug 2020 07:35) (113/72 - 118/75)  BP(mean): --  RR: 16 (24 Aug 2020 07:35) (14 - 16)  SpO2: 95% (24 Aug 2020 07:35) (95% - 100%)    PHYSICAL EXAM:  GENERAL: NAD, resting comfortably in bed  HEENT:  anicteric, moist mucous membranes  CHEST/LUNG:  CTA b/l, no rales, wheezes, or rhonchi  HEART:  RRR, S1, S2, no murmurs or rubs  ABDOMEN:  BS+ x4, TTP epigastric region, soft, nondistended, no rebound/guarding  EXTREMITIES: mild calf tenderness b/l, no edema, cyanosis, 5/5 strength BL, sensation intact, negative straight leg test   NERVOUS SYSTEM: answers questions and follows commands appropriately    LABS:                        14.6   4.49  )-----------( 200      ( 24 Aug 2020 06:57 )             41.3     CBC Full  -  ( 24 Aug 2020 06:57 )  WBC Count : 4.49 K/uL  Hemoglobin : 14.6 g/dL  Hematocrit : 41.3 %  Platelet Count - Automated : 200 K/uL  Mean Cell Volume : 95.6 fl  Mean Cell Hemoglobin : 33.8 pg  Mean Cell Hemoglobin Concentration : 35.4 gm/dL  Auto Neutrophil # : x  Auto Lymphocyte # : x  Auto Monocyte # : x  Auto Eosinophil # : x  Auto Basophil # : x  Auto Neutrophil % : x  Auto Lymphocyte % : x  Auto Monocyte % : x  Auto Eosinophil % : x  Auto Basophil % : x    24 Aug 2020 06:57    141    |  109    |  12     ----------------------------<  97     4.2     |  29     |  0.92     Ca    9.7        24 Aug 2020 06:57  Mg     2.2       24 Aug 2020 06:57      PT/INR - ( 22 Aug 2020 23:45 )   PT: 12.5 sec;   INR: 1.07 ratio         PTT - ( 22 Aug 2020 23:45 )  PTT:25.0 sec    CAPILLARY BLOOD GLUCOSE      RADIOLOGY & ADDITIONAL TESTS:  < from: CT Angio Chest w/ IV Cont (08.24.20 @ 09:53) >  EXAM:  CT ANGIO CHEST (W)AW IC                            FINDINGS:    LUNGS AND AIRWAYS: Patent central airways.5.3 mm right lower lobe nodule (2:69). 3 mm right middle lobe calcified granuloma. 7.1 mm right middle lobe nodule (2:57). 7.4 mm right middle lobe nodule adjacent to a pulmonary venous branch (2:55). 4.4 mm right upper lobe nodule (3:101). 6 mm lingular nodule (3:293). 6 mm left lower lobe nodule (3:313). Mild bibasilar dependent changes.  PLEURA: No pleural effusion.  MEDIASTINUM AND GITA: No lymphadenopathy.  VESSELS: No evidence of pulmonary embolism.  HEART: Heart size is normal. No pericardial effusion.  CHEST WALL AND LOWER NECK: Within normal limits.  VISUALIZED UPPER ABDOMEN: Within normal limits.  BONES: Within normal limits.    IMPRESSION:  Multiple bilateral lung nodules indeterminate for metastatic disease.    No evidence of pulmonary embolism.    < end of copied text >      < from: US Duplex Venous Lower Ext Complete, Bilateral (08.23.20 @ 12:45) >  EXAM:  US DPLX LWR EXT VEINS COMPL BI                          TECHNIQUE: Duplex sonography of the BILATERAL LOWER extremity veins with color and spectral Doppler, with and without compression.    FINDINGS:    There is normal compressibility of the bilateral common femoral, femoral and popliteal veins.  Doppler examination shows normal spontaneous and phasic flow.    No calf vein thrombosis is detected.    IMPRESSION:  No evidence of deep venous thrombosis in either lower extremity.    < end of copied text >    < from: CT Abdomen and Pelvis w/ Oral Cont and w/ IV Cont (08.23.20 @ 06:12) >  EXAM:  CT ABDOMEN AND PELVIS OC IC    FINDINGS:  LIMITATIONS: None.    LOWER CHEST: Several pulmonary nodules measuring up to 8 mm in the left lower lobe. This is stable. Others were not included in the field of view previously.  ABDOMINAL WALL: Within normal limits.  VESSELS: Within normal limits.  BOWEL: No evidence of obstruction. Normal distal esophagus, stomach and duodenum. No gross abnormalities of the colon. Appendix normal.    LIVER: Within normal limits.  BILE DUCTS: Normal caliber  GALLBLADDER: Within normal limits.  SPLEEN: Within normal limits.  PANCREAS: Within normal limits.  ADRENALS: Within normal limits.  KIDNEYS/URETERS: Within normal limits.    BLADDER: Within normal limits.  REPRODUCTIVE ORGANS: Within normal limits.    PERITONEUM: No ascites.  RETROPERITONEUM/LYMPH NODES: No lymphadenopathy.    BONES: No acute osseous pathology.    IMPRESSION:  No evidence of acute intra-abdominal pathology. The largest pulmonary nodule appears stable. Additional nodules are less than 6 mm and were not previously included in the field-of-view; if the patient is not at high risk for malignancy, no follow-up is necessary.    < end of copied text >    Personally reviewed.     Consultant(s) Notes Reviewed:  [x] YES  [ ] NO

## 2020-08-24 NOTE — DISCHARGE NOTE PROVIDER - CARE PROVIDER_API CALL
Byron Coats  [facility]  [address] [place] [address]  Phone: [phone]  Fax: [phone]  Follow Up Time:     Iron Hernandez  GASTROENTEROLOGY  [address] [place] [address]  Phone: [phone]  Fax: [phone]  Follow Up Time:     Chaka James  CARDIOLOGY  [address] [facility] [place] [address]  Phone: [phone]  Fax: [phone]  [facility]: [facility]  [address] [place] [address]  Phone: [phone]  Fax: [phone]  Follow Up Time:     Iron Hernandez  GASTROENTEROLOGY  [address] [place] [address]  Phone: [phone]  Fax: [phone]  Follow Up Time:     Chaka James)  Cardiology; Internal Medicine; Nuclear Cardiology  [address] [facility] [place] [address]  Phone: [phone]  Fax: [phone]  Follow Up Time:     Samuel Ball)  Critical Care Medicine; [facility]; Internal Medicine; Pulmonary Disease  [address] [place] [address]  Phone: [phone]  Fax: [phone]  Follow Up Time:

## 2020-08-25 ENCOUNTER — RESULT REVIEW (OUTPATIENT)
Age: 60
End: 2020-08-25

## 2020-08-25 ENCOUNTER — TRANSCRIPTION ENCOUNTER (OUTPATIENT)
Age: 60
End: 2020-08-25

## 2020-08-25 VITALS
DIASTOLIC BLOOD PRESSURE: 85 MMHG | SYSTOLIC BLOOD PRESSURE: 136 MMHG | RESPIRATION RATE: 16 BRPM | HEART RATE: 60 BPM | OXYGEN SATURATION: 98 % | TEMPERATURE: 98 F

## 2020-08-25 LAB
ANION GAP SERPL CALC-SCNC: 3 MMOL/L — LOW (ref 5–17)
BASOPHILS # BLD AUTO: 0.03 K/UL — SIGNIFICANT CHANGE UP (ref 0–0.2)
BASOPHILS NFR BLD AUTO: 0.6 % — SIGNIFICANT CHANGE UP (ref 0–2)
BUN SERPL-MCNC: 12 MG/DL — SIGNIFICANT CHANGE UP (ref 7–23)
CALCIUM SERPL-MCNC: 9.1 MG/DL — SIGNIFICANT CHANGE UP (ref 8.5–10.1)
CHLORIDE SERPL-SCNC: 111 MMOL/L — HIGH (ref 96–108)
CO2 SERPL-SCNC: 29 MMOL/L — SIGNIFICANT CHANGE UP (ref 22–31)
CREAT SERPL-MCNC: 0.93 MG/DL — SIGNIFICANT CHANGE UP (ref 0.5–1.3)
EOSINOPHIL # BLD AUTO: 0.13 K/UL — SIGNIFICANT CHANGE UP (ref 0–0.5)
EOSINOPHIL NFR BLD AUTO: 2.8 % — SIGNIFICANT CHANGE UP (ref 0–6)
GLUCOSE SERPL-MCNC: 93 MG/DL — SIGNIFICANT CHANGE UP (ref 70–99)
HCT VFR BLD CALC: 39.1 % — SIGNIFICANT CHANGE UP (ref 39–50)
HGB BLD-MCNC: 13.8 G/DL — SIGNIFICANT CHANGE UP (ref 13–17)
IMM GRANULOCYTES NFR BLD AUTO: 0.2 % — SIGNIFICANT CHANGE UP (ref 0–1.5)
LYMPHOCYTES # BLD AUTO: 1.91 K/UL — SIGNIFICANT CHANGE UP (ref 1–3.3)
LYMPHOCYTES # BLD AUTO: 40.6 % — SIGNIFICANT CHANGE UP (ref 13–44)
MCHC RBC-ENTMCNC: 33.4 PG — SIGNIFICANT CHANGE UP (ref 27–34)
MCHC RBC-ENTMCNC: 35.3 GM/DL — SIGNIFICANT CHANGE UP (ref 32–36)
MCV RBC AUTO: 94.7 FL — SIGNIFICANT CHANGE UP (ref 80–100)
MONOCYTES # BLD AUTO: 0.4 K/UL — SIGNIFICANT CHANGE UP (ref 0–0.9)
MONOCYTES NFR BLD AUTO: 8.5 % — SIGNIFICANT CHANGE UP (ref 2–14)
NEUTROPHILS # BLD AUTO: 2.23 K/UL — SIGNIFICANT CHANGE UP (ref 1.8–7.4)
NEUTROPHILS NFR BLD AUTO: 47.3 % — SIGNIFICANT CHANGE UP (ref 43–77)
NRBC # BLD: 0 /100 WBCS — SIGNIFICANT CHANGE UP (ref 0–0)
PLATELET # BLD AUTO: 190 K/UL — SIGNIFICANT CHANGE UP (ref 150–400)
POTASSIUM SERPL-MCNC: 4.1 MMOL/L — SIGNIFICANT CHANGE UP (ref 3.5–5.3)
POTASSIUM SERPL-SCNC: 4.1 MMOL/L — SIGNIFICANT CHANGE UP (ref 3.5–5.3)
RBC # BLD: 4.13 M/UL — LOW (ref 4.2–5.8)
RBC # FLD: 11.7 % — SIGNIFICANT CHANGE UP (ref 10.3–14.5)
SODIUM SERPL-SCNC: 143 MMOL/L — SIGNIFICANT CHANGE UP (ref 135–145)
WBC # BLD: 4.71 K/UL — SIGNIFICANT CHANGE UP (ref 3.8–10.5)
WBC # FLD AUTO: 4.71 K/UL — SIGNIFICANT CHANGE UP (ref 3.8–10.5)

## 2020-08-25 PROCEDURE — 84484 ASSAY OF TROPONIN QUANT: CPT

## 2020-08-25 PROCEDURE — 86769 SARS-COV-2 COVID-19 ANTIBODY: CPT

## 2020-08-25 PROCEDURE — 85027 COMPLETE CBC AUTOMATED: CPT

## 2020-08-25 PROCEDURE — 93005 ELECTROCARDIOGRAM TRACING: CPT

## 2020-08-25 PROCEDURE — 96375 TX/PRO/DX INJ NEW DRUG ADDON: CPT

## 2020-08-25 PROCEDURE — 86803 HEPATITIS C AB TEST: CPT

## 2020-08-25 PROCEDURE — 82550 ASSAY OF CK (CPK): CPT

## 2020-08-25 PROCEDURE — 93970 EXTREMITY STUDY: CPT

## 2020-08-25 PROCEDURE — 74177 CT ABD & PELVIS W/CONTRAST: CPT

## 2020-08-25 PROCEDURE — 97161 PT EVAL LOW COMPLEX 20 MIN: CPT

## 2020-08-25 PROCEDURE — 85379 FIBRIN DEGRADATION QUANT: CPT

## 2020-08-25 PROCEDURE — 87635 SARS-COV-2 COVID-19 AMP PRB: CPT

## 2020-08-25 PROCEDURE — 80053 COMPREHEN METABOLIC PANEL: CPT

## 2020-08-25 PROCEDURE — 99285 EMERGENCY DEPT VISIT HI MDM: CPT | Mod: 25

## 2020-08-25 PROCEDURE — 83735 ASSAY OF MAGNESIUM: CPT

## 2020-08-25 PROCEDURE — 71275 CT ANGIOGRAPHY CHEST: CPT

## 2020-08-25 PROCEDURE — 83880 ASSAY OF NATRIURETIC PEPTIDE: CPT

## 2020-08-25 PROCEDURE — 84443 ASSAY THYROID STIM HORMONE: CPT

## 2020-08-25 PROCEDURE — 36415 COLL VENOUS BLD VENIPUNCTURE: CPT

## 2020-08-25 PROCEDURE — 88305 TISSUE EXAM BY PATHOLOGIST: CPT

## 2020-08-25 PROCEDURE — 85730 THROMBOPLASTIN TIME PARTIAL: CPT

## 2020-08-25 PROCEDURE — 88312 SPECIAL STAINS GROUP 1: CPT | Mod: 26

## 2020-08-25 PROCEDURE — 80061 LIPID PANEL: CPT

## 2020-08-25 PROCEDURE — 99239 HOSP IP/OBS DSCHRG MGMT >30: CPT

## 2020-08-25 PROCEDURE — 88305 TISSUE EXAM BY PATHOLOGIST: CPT | Mod: 26

## 2020-08-25 PROCEDURE — 85610 PROTHROMBIN TIME: CPT

## 2020-08-25 PROCEDURE — 71045 X-RAY EXAM CHEST 1 VIEW: CPT

## 2020-08-25 PROCEDURE — 70450 CT HEAD/BRAIN W/O DYE: CPT

## 2020-08-25 PROCEDURE — 96374 THER/PROPH/DIAG INJ IV PUSH: CPT

## 2020-08-25 PROCEDURE — 88312 SPECIAL STAINS GROUP 1: CPT

## 2020-08-25 PROCEDURE — 83690 ASSAY OF LIPASE: CPT

## 2020-08-25 PROCEDURE — 80048 BASIC METABOLIC PNL TOTAL CA: CPT

## 2020-08-25 PROCEDURE — 96361 HYDRATE IV INFUSION ADD-ON: CPT

## 2020-08-25 PROCEDURE — 93306 TTE W/DOPPLER COMPLETE: CPT

## 2020-08-25 RX ORDER — PANTOPRAZOLE SODIUM 20 MG/1
1 TABLET, DELAYED RELEASE ORAL
Qty: 0 | Refills: 0 | DISCHARGE

## 2020-08-25 RX ORDER — PANTOPRAZOLE SODIUM 20 MG/1
1 TABLET, DELAYED RELEASE ORAL
Qty: 30 | Refills: 0
Start: 2020-08-25 | End: 2020-09-23

## 2020-08-25 RX ORDER — OLMESARTAN MEDOXOMIL 5 MG/1
1 TABLET, FILM COATED ORAL
Qty: 0 | Refills: 0 | DISCHARGE

## 2020-08-25 RX ORDER — PANTOPRAZOLE SODIUM 20 MG/1
2 TABLET, DELAYED RELEASE ORAL
Qty: 0 | Refills: 0 | DISCHARGE

## 2020-08-25 RX ADMIN — Medication 1 GRAM(S): at 05:51

## 2020-08-25 RX ADMIN — Medication 25 MILLIGRAM(S): at 05:51

## 2020-08-25 RX ADMIN — SODIUM CHLORIDE 75 MILLILITER(S): 9 INJECTION INTRAMUSCULAR; INTRAVENOUS; SUBCUTANEOUS at 07:05

## 2020-08-25 RX ADMIN — ENOXAPARIN SODIUM 40 MILLIGRAM(S): 100 INJECTION SUBCUTANEOUS at 15:09

## 2020-08-25 RX ADMIN — AMLODIPINE BESYLATE 10 MILLIGRAM(S): 2.5 TABLET ORAL at 05:50

## 2020-08-25 RX ADMIN — Medication 81 MILLIGRAM(S): at 15:08

## 2020-08-25 RX ADMIN — PANTOPRAZOLE SODIUM 40 MILLIGRAM(S): 20 TABLET, DELAYED RELEASE ORAL at 05:51

## 2020-08-25 RX ADMIN — Medication 1 GRAM(S): at 00:08

## 2020-08-25 RX ADMIN — Medication 1 GRAM(S): at 15:08

## 2020-08-25 RX ADMIN — POLYETHYLENE GLYCOL 3350 17 GRAM(S): 17 POWDER, FOR SOLUTION ORAL at 15:09

## 2020-08-25 NOTE — PROGRESS NOTE ADULT - SUBJECTIVE AND OBJECTIVE BOX
[facility] [place]       HPI:  61 yo male with past medical history of hypertension and h.mei who presents with complains of intermittent  epigastric pain radiating into his chest for about 1 month and a half. Reports worsening epigastric pain with movement and after eating meals and improvement with surcalfate.  Patient reports he was diagnosed with h.mei about the same time his symptoms started and was treat his h mei for 2 weeks by his PCP but has not had follow up testing as of yet. Patient states he came to the ED last night because he was awoken from sleep with pain and a "cold sweat" and a headache . Denies any SOB, dizziness,  new change in chest pain, new radiation to neck or back or arms, denies nausea, new bowel or bladder issues, change in stool, change in weight. Denies any dizziness, lightheadedness, change in vision, palpitations, back pain, difficulty with ambulation     VS in the ED, elevated /100 which improved after receiving Labetalol 10 mg Iv x 1.  Labs notable for neg Troponin x 2, normal BNP,  elevated D-Dimer 373. EKG demonstrated NSR. CT ABD was neg for acute intra-abdominal pathology CT Head was neg.   Was seen by cardiology ( Dr. Kurtz) in the ED, given elevated D-dimer plan to admit for chest pain workup with r/o for MI and possible PE and was started on full dose ASA and Lovenox and amlodipine and metorpolol  while in the ED (23 Aug 2020 07:31)        SUBJECTIVE:      ALLERGIES:  Allergies    No Known Allergies    Intolerances          MEDICATIONS  (STANDING):  amLODIPine   Tablet 10 milliGRAM(s) Oral daily  aspirin  chewable 81 milliGRAM(s) Oral daily  enoxaparin Injectable 40 milliGRAM(s) SubCutaneous daily  metoprolol succinate ER 25 milliGRAM(s) Oral daily  pantoprazole    Tablet 40 milliGRAM(s) Oral before breakfast  polyethylene glycol 3350 17 Gram(s) Oral daily  sodium chloride 0.9%. 1000 milliLiter(s) (75 mL/Hr) IV Continuous <Continuous>  sucralfate suspension 1 Gram(s) Oral four times a day    MEDICATIONS  (PRN):      REVIEW OF SYSTEMS:  CONSTITUTIONAL: No fever,  RESPIRATORY: No cough, wheezing, shortness of breath  CARDIOVASCULAR: No chest pain, dyspnea, palpitations, dizziness, syncope, paroxysmal nocturnal dyspnea, orthopnea, or arm or leg swelling  GASTROINTESTINAL: No abdominal  or epigastric pain, nausea, vomiting,  diarrhea  NEUROLOGICAL: No headaches,  loss of strength, numbness, or tremors    Vital Signs Last 24 Hrs  T(C): 36.9 (25 Aug 2020 12:01), Max: 36.9 (25 Aug 2020 08:25)  T(F): 98.4 (25 Aug 2020 12:01), Max: 98.5 (25 Aug 2020 08:25)  HR: 55 (25 Aug 2020 12:01) (55 - 63)  BP: 124/76 (25 Aug 2020 12:01) (97/59 - 124/76)  BP(mean): --  RR: 15 (25 Aug 2020 12:01) (15 - 16)  SpO2: 96% (25 Aug 2020 12:01) (95% - 97%)    PHYSICAL EXAM:  HEAD:  Atraumatic, Normocephalic  NECK: Supple and normal thyroid.  No JVD or carotid bruit.   HEART: S1, S2 regular , 1/6 soft ejection systolic murmur in mitral area , no thrill and no gallops .  PULMONARY: Bilateral vesicular breathing , few scattered ronchi and few scattered rales are present .  ABDOMEN: Soft nontender and positive bowl sounds   EXTREMITIES:  No clubbing, cyanosis, or pedal  edema  NEUROLOGICAL: AAOX3 , no focal deficit .    LABS:                        13.8   4.71  )-----------( 190      ( 25 Aug 2020 07:15 )             39.1     08-25    143  |  111<H>  |  12  ----------------------------<  93  4.1   |  29  |  0.93    Ca    9.1      25 Aug 2020 07:15  Mg     2.2     08-24      CARDIAC MARKERS ( 24 Aug 2020 06:57 )  x     / x     / 82 U/L / x     / x              BNP      EKG:  ECHO:  IMAGING:    Assessment/Plan    Will continue to follow during hospital course and give further recommendations as needed . Thanks for your referral . if any questions please contact me at office ([phone] [phone]) Rancho Kurtz MD [facility]  Cardiology F/U :      HPI:  59 yo male with past medical history of hypertension and h.andersonori who presents with complains of intermittent  epigastric pain radiating into his chest for about 1 month and a half. Reports worsening epigastric pain with movement and after eating meals and improvement with surcalfate.  Patient reports he was diagnosed with h.andersonori about the same time his symptoms started and was treat his h mei for 2 weeks by his PCP but has not had follow up testing as of yet. Patient states he came to the ED last night because he was awoken from sleep with pain and a "cold sweat" and a headache . Denies any SOB, dizziness,  new change in chest pain, new radiation to neck or back or arms, denies nausea, new bowel or bladder issues, change in stool, change in weight. Denies any dizziness, lightheadedness, change in vision, palpitations, back pain, difficulty with ambulation     VS in the ED, elevated /100 which improved after receiving Labetalol 10 mg Iv x 1.  Labs notable for neg Troponin x 2, normal BNP,  elevated D-Dimer 373. EKG demonstrated NSR. CT ABD was neg for acute intra-abdominal pathology CT Head was neg.   Was seen by cardiology ( Dr. Kurtz) in the ED, given elevated D-dimer plan to admit for chest pain workup with r/o for MI and possible PE and was started on full dose ASA and Lovenox and amlodipine and metorpolol  while in the ED (23 Aug 2020 07:31)        SUBJECTIVE: Patient lying comfortable .      ALLERGIES:  Allergies    No Known Allergies    Intolerances          MEDICATIONS  (STANDING):  amLODIPine   Tablet 10 milliGRAM(s) Oral daily  aspirin  chewable 81 milliGRAM(s) Oral daily  enoxaparin Injectable 40 milliGRAM(s) SubCutaneous daily  metoprolol succinate ER 25 milliGRAM(s) Oral daily  pantoprazole    Tablet 40 milliGRAM(s) Oral before breakfast  polyethylene glycol 3350 17 Gram(s) Oral daily  sodium chloride 0.9%. 1000 milliLiter(s) (75 mL/Hr) IV Continuous <Continuous>  sucralfate suspension 1 Gram(s) Oral four times a day    MEDICATIONS  (PRN):      REVIEW OF SYSTEMS:  CONSTITUTIONAL: No fever,  RESPIRATORY: No cough, wheezing, shortness of breath  CARDIOVASCULAR: No chest pain, dyspnea, palpitations, dizziness, syncope, paroxysmal nocturnal dyspnea, orthopnea, or arm or leg swelling  GASTROINTESTINAL: No abdominal  or epigastric pain, nausea, vomiting,  diarrhea  NEUROLOGICAL: No headaches,  loss of strength, numbness, or tremors  Skin : No itching and rashes .  Musculoskeletal : No joint swellings .  Endocrinology : No heat and cold intolerance .  Psychiatry : Patient is calm .  Musculoskeletal : No joint swellings .    Vital Signs Last 24 Hrs  T(C): 36.9 (25 Aug 2020 12:01), Max: 36.9 (25 Aug 2020 08:25)  T(F): 98.4 (25 Aug 2020 12:01), Max: 98.5 (25 Aug 2020 08:25)  HR: 55 (25 Aug 2020 12:01) (55 - 63)  BP: 124/76 (25 Aug 2020 12:01) (97/59 - 124/76)  BP(mean): --  RR: 15 (25 Aug 2020 12:01) (15 - 16)  SpO2: 96% (25 Aug 2020 12:01) (95% - 97%)    PHYSICAL EXAM:  HEAD:  Atraumatic, Normocephalic  NECK: Supple and normal thyroid.  No JVD or carotid bruit.   HEART: S1, S2 regular , 1/6 soft ejection systolic murmur in mitral area , no thrill and no gallops .  PULMONARY: Bilateral vesicular breathing , few scattered ronchi and few scattered rales are present .  ABDOMEN: Soft nontender and positive bowl sounds   EXTREMITIES:  No clubbing, cyanosis, or pedal  edema  NEUROLOGICAL: AAOX3 , no focal deficit .  Skin : No itching .  Hematology : No bleeding  Endocrinology : No heat and cold intolerance .  Psychiatry : Patient is calm .  Musculoskeletal : Patient has mild arthritis .    LABS:                        13.8   4.71  )-----------( 190      ( 25 Aug 2020 07:15 )             39.1     08-25    143  |  111<H>  |  12  ----------------------------<  93  4.1   |  29  |  0.93    Ca    9.1      25 Aug 2020 07:15  Mg     2.2     08-24      CARDIAC MARKERS ( 24 Aug 2020 06:57 )  x     / x     / 82 U/L / x     / x        ECHO : LVEF 60-65% . Mild MR , Grade 1 LV diastolic dysfunction is present .      Assessment/Plan  Patient has :  1) Atypical chest pain . Troponin I negative .  2) Abdominal pain , GERD R/O PUD .  3) Hypertension and BP stable so far .  Plan : 1) Patient cardiac wise stable and cleared for EGD . 2) Discontinue monitor .  Will continue to follow during hospital course and give further recommendations as needed . Thanks for your referral . if any questions please contact me at office ([phone])

## 2020-08-25 NOTE — DISCHARGE NOTE NURSING/CASE MANAGEMENT/SOCIAL WORK - PATIENT PORTAL LINK FT
You can access the FollowMyHealth Patient Portal offered by [facility] by registering at the following website: [email or web address]. By joining [employer]’s FollowMyHealth portal, you will also be able to view your health information using other applications (apps) compatible with our system.

## 2020-09-08 ENCOUNTER — APPOINTMENT (OUTPATIENT)
Dept: CARDIOLOGY | Facility: CLINIC | Age: 60
End: 2020-09-08
Payer: COMMERCIAL

## 2020-09-08 ENCOUNTER — NON-APPOINTMENT (OUTPATIENT)
Age: 60
End: 2020-09-08

## 2020-09-08 VITALS
BODY MASS INDEX: 28.82 KG/M2 | DIASTOLIC BLOOD PRESSURE: 74 MMHG | WEIGHT: 173 LBS | HEIGHT: 65 IN | SYSTOLIC BLOOD PRESSURE: 114 MMHG | OXYGEN SATURATION: 100 % | HEART RATE: 53 BPM

## 2020-09-08 PROCEDURE — 99214 OFFICE O/P EST MOD 30 MIN: CPT | Mod: 25

## 2020-09-08 PROCEDURE — 93000 ELECTROCARDIOGRAM COMPLETE: CPT

## 2020-09-09 PROBLEM — I10 ESSENTIAL (PRIMARY) HYPERTENSION: Chronic | Status: ACTIVE | Noted: 2020-05-09

## 2020-09-09 PROBLEM — K29.70 GASTRITIS, UNSPECIFIED, WITHOUT BLEEDING: Chronic | Status: ACTIVE | Noted: 2020-08-23

## 2020-10-25 ENCOUNTER — EMERGENCY (EMERGENCY)
Facility: HOSPITAL | Age: 60
LOS: 1 days | Discharge: HOME | End: 2020-10-25
Attending: EMERGENCY MEDICINE | Admitting: EMERGENCY MEDICINE
Payer: COMMERCIAL

## 2020-10-25 VITALS
RESPIRATION RATE: 16 BRPM | HEART RATE: 58 BPM | DIASTOLIC BLOOD PRESSURE: 71 MMHG | OXYGEN SATURATION: 98 % | SYSTOLIC BLOOD PRESSURE: 117 MMHG

## 2020-10-25 VITALS
HEART RATE: 67 BPM | DIASTOLIC BLOOD PRESSURE: 84 MMHG | RESPIRATION RATE: 16 BRPM | TEMPERATURE: 98 F | SYSTOLIC BLOOD PRESSURE: 136 MMHG | WEIGHT: 175.05 LBS | HEIGHT: 65 IN | OXYGEN SATURATION: 98 %

## 2020-10-25 DIAGNOSIS — Z98.890 OTHER SPECIFIED POSTPROCEDURAL STATES: Chronic | ICD-10-CM

## 2020-10-25 LAB
ALBUMIN SERPL ELPH-MCNC: 4.1 G/DL — SIGNIFICANT CHANGE UP (ref 3.3–5)
ALP SERPL-CCNC: 133 U/L — HIGH (ref 40–120)
ALT FLD-CCNC: 27 U/L — SIGNIFICANT CHANGE UP (ref 12–78)
ANION GAP SERPL CALC-SCNC: 9 MMOL/L — SIGNIFICANT CHANGE UP (ref 5–17)
APTT BLD: 29.3 SEC — SIGNIFICANT CHANGE UP (ref 27.5–35.5)
AST SERPL-CCNC: 21 U/L — SIGNIFICANT CHANGE UP (ref 15–37)
BASOPHILS # BLD AUTO: 0.03 K/UL — SIGNIFICANT CHANGE UP (ref 0–0.2)
BASOPHILS NFR BLD AUTO: 0.5 % — SIGNIFICANT CHANGE UP (ref 0–2)
BILIRUB SERPL-MCNC: 1 MG/DL — SIGNIFICANT CHANGE UP (ref 0.2–1.2)
BUN SERPL-MCNC: 14 MG/DL — SIGNIFICANT CHANGE UP (ref 7–23)
CALCIUM SERPL-MCNC: 8.7 MG/DL — SIGNIFICANT CHANGE UP (ref 8.5–10.1)
CHLORIDE SERPL-SCNC: 111 MMOL/L — HIGH (ref 96–108)
CK MB CFR SERPL CALC: 1.2 NG/ML — SIGNIFICANT CHANGE UP (ref 0–3.6)
CO2 SERPL-SCNC: 19 MMOL/L — LOW (ref 22–31)
CREAT SERPL-MCNC: 1 MG/DL — SIGNIFICANT CHANGE UP (ref 0.5–1.3)
EOSINOPHIL # BLD AUTO: 0.08 K/UL — SIGNIFICANT CHANGE UP (ref 0–0.5)
EOSINOPHIL NFR BLD AUTO: 1.2 % — SIGNIFICANT CHANGE UP (ref 0–6)
GLUCOSE SERPL-MCNC: 90 MG/DL — SIGNIFICANT CHANGE UP (ref 70–99)
HCT VFR BLD CALC: 42.1 % — SIGNIFICANT CHANGE UP (ref 39–50)
HGB BLD-MCNC: 15.4 G/DL — SIGNIFICANT CHANGE UP (ref 13–17)
IMM GRANULOCYTES NFR BLD AUTO: 0.2 % — SIGNIFICANT CHANGE UP (ref 0–1.5)
INR BLD: 1.13 RATIO — SIGNIFICANT CHANGE UP (ref 0.88–1.16)
LIDOCAIN IGE QN: 164 U/L — SIGNIFICANT CHANGE UP (ref 73–393)
LYMPHOCYTES # BLD AUTO: 1.47 K/UL — SIGNIFICANT CHANGE UP (ref 1–3.3)
LYMPHOCYTES # BLD AUTO: 22.9 % — SIGNIFICANT CHANGE UP (ref 13–44)
MAGNESIUM SERPL-MCNC: 2.1 MG/DL — SIGNIFICANT CHANGE UP (ref 1.6–2.6)
MCHC RBC-ENTMCNC: 34.1 PG — HIGH (ref 27–34)
MCHC RBC-ENTMCNC: 36.6 GM/DL — HIGH (ref 32–36)
MCV RBC AUTO: 93.1 FL — SIGNIFICANT CHANGE UP (ref 80–100)
MONOCYTES # BLD AUTO: 0.36 K/UL — SIGNIFICANT CHANGE UP (ref 0–0.9)
MONOCYTES NFR BLD AUTO: 5.6 % — SIGNIFICANT CHANGE UP (ref 2–14)
NEUTROPHILS # BLD AUTO: 4.46 K/UL — SIGNIFICANT CHANGE UP (ref 1.8–7.4)
NEUTROPHILS NFR BLD AUTO: 69.6 % — SIGNIFICANT CHANGE UP (ref 43–77)
NRBC # BLD: 0 /100 WBCS — SIGNIFICANT CHANGE UP (ref 0–0)
PLATELET # BLD AUTO: 220 K/UL — SIGNIFICANT CHANGE UP (ref 150–400)
POTASSIUM SERPL-MCNC: 4.3 MMOL/L — SIGNIFICANT CHANGE UP (ref 3.5–5.3)
POTASSIUM SERPL-SCNC: 4.3 MMOL/L — SIGNIFICANT CHANGE UP (ref 3.5–5.3)
PROT SERPL-MCNC: 7.8 G/DL — SIGNIFICANT CHANGE UP (ref 6–8.3)
PROTHROM AB SERPL-ACNC: 13.1 SEC — SIGNIFICANT CHANGE UP (ref 10.6–13.6)
RBC # BLD: 4.52 M/UL — SIGNIFICANT CHANGE UP (ref 4.2–5.8)
RBC # FLD: 11.9 % — SIGNIFICANT CHANGE UP (ref 10.3–14.5)
SODIUM SERPL-SCNC: 139 MMOL/L — SIGNIFICANT CHANGE UP (ref 135–145)
TROPONIN I SERPL-MCNC: <.015 NG/ML — SIGNIFICANT CHANGE UP (ref 0.01–0.04)
WBC # BLD: 6.41 K/UL — SIGNIFICANT CHANGE UP (ref 3.8–10.5)
WBC # FLD AUTO: 6.41 K/UL — SIGNIFICANT CHANGE UP (ref 3.8–10.5)

## 2020-10-25 PROCEDURE — 93010 ELECTROCARDIOGRAM REPORT: CPT

## 2020-10-25 PROCEDURE — 82553 CREATINE MB FRACTION: CPT

## 2020-10-25 PROCEDURE — 71045 X-RAY EXAM CHEST 1 VIEW: CPT

## 2020-10-25 PROCEDURE — 85610 PROTHROMBIN TIME: CPT

## 2020-10-25 PROCEDURE — 99284 EMERGENCY DEPT VISIT MOD MDM: CPT | Mod: 25

## 2020-10-25 PROCEDURE — 83690 ASSAY OF LIPASE: CPT

## 2020-10-25 PROCEDURE — 80053 COMPREHEN METABOLIC PANEL: CPT

## 2020-10-25 PROCEDURE — 36415 COLL VENOUS BLD VENIPUNCTURE: CPT

## 2020-10-25 PROCEDURE — 83735 ASSAY OF MAGNESIUM: CPT

## 2020-10-25 PROCEDURE — 99285 EMERGENCY DEPT VISIT HI MDM: CPT

## 2020-10-25 PROCEDURE — 71045 X-RAY EXAM CHEST 1 VIEW: CPT | Mod: 26

## 2020-10-25 PROCEDURE — 84484 ASSAY OF TROPONIN QUANT: CPT

## 2020-10-25 PROCEDURE — 85025 COMPLETE CBC W/AUTO DIFF WBC: CPT

## 2020-10-25 PROCEDURE — 93005 ELECTROCARDIOGRAM TRACING: CPT

## 2020-10-25 PROCEDURE — 85730 THROMBOPLASTIN TIME PARTIAL: CPT

## 2020-10-25 RX ORDER — ASPIRIN/CALCIUM CARB/MAGNESIUM 324 MG
325 TABLET ORAL ONCE
Refills: 0 | Status: COMPLETED | OUTPATIENT
Start: 2020-10-25 | End: 2020-10-25

## 2020-10-25 RX ADMIN — Medication 325 MILLIGRAM(S): at 14:02

## 2020-10-25 NOTE — ED ADULT NURSE NOTE - NSIMPLEMENTINTERV_GEN_ALL_ED
Implemented All Universal Safety Interventions:  [place] to call system. Call bell, personal items and telephone within reach. Instruct patient to call for assistance. Room bathroom lighting operational. Non-slip footwear when patient is off stretcher. Physically safe environment: no spills, clutter or unnecessary equipment. Stretcher in lowest position, wheels locked, appropriate side rails in place.

## 2020-10-25 NOTE — ED PROVIDER NOTE - OBJECTIVE STATEMENT
60 male presents to ER c/o chest discomfort, non radiating, elevated blood pressure, states yesterday his blood pressure was 153/93 and today was 156/96, patient took his blood pressure medication verapamil 120mg and enalapril 10mg at 9:30am.

## 2020-10-25 NOTE — ED ADULT NURSE NOTE - CHPI ED NUR SYMPTOMS NEG
no chills/no dizziness/no shortness of breath/no vomiting/no back pain/no syncope/no nausea/no congestion/no diaphoresis/no fever

## 2020-10-25 NOTE — ED PROVIDER NOTE - CARE PROVIDER_API CALL
Freddy Sommer)  Cardio [facility]  [address] [place] [address]  Phone: [phone]  Fax: [phone]  Follow Up Time:

## 2020-10-25 NOTE — ED ADULT NURSE NOTE - OBJECTIVE STATEMENT
60 year old male presents to the ED complaining of chest pain and hypertension. Patient reports left sided chest wall pain and high blood pressure since yesterday. Patient found pressure to be 153/93 yesterday. Patient took regular blood pressure medication, verapamil. On arrival today BP in triage 136/84. Patient denies shortness of breath, palpitations, cough. Patient denies fever/chills. Patient denies dizziness/headache.

## 2020-10-25 NOTE — ED PROVIDER NOTE - PATIENT PORTAL LINK FT
You can access the FollowMyHealth Patient Portal offered by [facility] by registering at the following website: [email or web address]. By joining [employer]’s FollowMyHealth portal, you will also be able to view your health information using other applications (apps) compatible with our system.

## 2020-10-25 NOTE — ED PROVIDER NOTE - CARE PLAN
Principal Discharge DX:	Chest pain, unspecified type  Secondary Diagnosis:	HTN (hypertension) with goal to be determined

## 2020-10-25 NOTE — ED PROVIDER NOTE - CLINICAL SUMMARY MEDICAL DECISION MAKING FREE TEXT BOX
chest discomfort and elevated blood pressure now resolved, f/u labs, cardiac enzymes, ekg, chest xray, re-eval

## 2020-10-25 NOTE — ED PROVIDER NOTE - PROGRESS NOTE DETAILS
patient resting comfortably in bed, labs, ekg, chest xray reviwed, no acute findings, vital signs are normal, spoke with cardiology Dr. Escalera, covering for Dr. Wood, can f/u as outpatient

## 2020-10-25 NOTE — ED PROVIDER NOTE - CARE PROVIDERS DIRECT ADDRESSES
[email or web address]

## 2020-10-25 NOTE — ED PROVIDER NOTE - NSFOLLOWUPINSTRUCTIONS_ED_ALL_ED_FT
Follow up with Dr. Sommer this week.        LO QUE NECESITA SABER:    ¿Qué es jazmyn dieta saludable para el corazón?Jazmyn dieta amira para el corazón es un plan alimenticio bajo en grasas no saludables y sodio (sal). El plan es alto en fibra y grasas saludables. Jazmyn dieta cardiosaludable ayuda a mejorar winnie niveles de colesterol y disminuye thakur riesgo de enfermedad cardiaca y accidente cerebrovascular. Un dietista le enseñará a leer y a entender las etiquetas de los alimentos.    ¿Qué pautas para la dieta debería seguir?  •Elija alimentos que contengan grasas saludables.?Las grasas no saturadasincluyen grasas monoinsaturadas y poliinsaturadas. Las grasas no saturadas se encuentran en alimentos bowen el frijol de soja, aceites de canola, de dean, de maíz y de girasol. Se encuentra también en la margarina suave hecha con aceite líquido vegetal.      ?Las grasas [place] 3se encuentran en ciertos pescados, bowen el salmón, el atún y la trucha, en las nueces y en la semilla de joe. Consuma pescado con altas cantidades de Omega-3 por lo menos 2 veces a la semana.   Orr de [place] 3           •Consuma entre 20 y 30 gramos de fibra cada día.Las frutas, los vegetales, los alimentos integrales y las legumbres (frijoles cocidos) son buenas orr de fibra.             •Limite o no ingiera grasas no saludables.?El colesterolse encuentra en alimentos de origen animal, bowen los huevos y la langosta al igual que en productos lácteos hechos con leche entera. Limite el colesterol por debajo de los 200 mg por día.      ?Las grasas saturadasse encuentran en rimma bowen el tocino y la hamburguesa. Estas se encuentran también en la piel del neida y del pavo, leche entera y mantequilla. Limite el consumo de grasas saturadas a menos del 7% del total de winnie calorías diarias.      ?La grasa transse encuentran en los alimentos envasados, bowen las papitas de bolsa y las galletas. También se encuentra en la margarina dura, algunos alimentos fritos y la manteca. No coma alimentos que contengan grasas trans.      •Limite el sodio bowen se le indique.Se le puede solicitar que limite el sodio a 2,000 a 2,300 mg por día. Elija alimentos bajos en sodio o sin sal agregada. Al preparar la comida añada muy poca sal o no use sal. Use hierbas y especias en vez de la sal.             ¿Qué puedo incluir en mi plan saludable para el corazón?Solicite que thakur nutricionista o el médico le indique cuántas porciones necesita consumir de los siguientes alimentos de cada pam alimenticio:   •Granos:?Panes, cereales y pastas de harina integral y arroz integral      ?Patatas fritas y galletas saladas bajas en grasa, bajas en sodio      •Verduras:?Brócoli, judías verdes, guisantes y espinacas      ?Berza, col y habas      ?Zanahorias, patatas dulces, tomates y pimientos      ?Vegetales enlatados sin venus añadida      •Frutas:?Plátanos, melocotones, peras y roper      ?Uvas, pasas y dátiles      ?Naranjas, mandarinas, toronja, jugo de naranja y jugo de toronja      ?Albaricoques, mangos, melón y papaya      ?Frambuesas y fresas      ?Conservas de frutas sin azúcares añadidos      •Lácteos bajos en grasa:?Leche sin grasa (descremada), leche 1%, leche baja en grasa de jaja, anacardo o leche de soja fortificada con calcio      ?Queso cottage, queso bajo en grasa y yogur regular o congelado      •Rimma y proteínas:?De Leon magros de carne de res y cerdo (lomo, pierna, rogelio), neida y pavo sin piel      ?Legumbres, productos de soya, claras de huevo o nueces        ¿Qué masoud limitar o no incluir en mi plan de perry para el corazón?  •Aceites y grasas no saludables:?leche entera o 2%, queso crema, crema agria o queso      ?De Leon de carne altos en grasas (bowen las costillas, las chuletas con hueso), el neida o pavo sin piel y las vísceras de animal bowen el hígado      ?Mantequilla, margarina en cherri, manteca y aceites para cocinar, bowen el aceite de turner o pitts      •Alimentos y líquidos ricos en sodio:?alimentos empaquetados, comidas congeladas, galletas, macarrón con queso y cereales con más de 300 mg de sodio por porción      ?Vegetales con sodio agregado, bowen aristeo instantáneas, verduras con salsas agregadas o conservas regulares de verduras      ?Rimma curadas o ahumadas, bowen perritos calientes, tocino y salchichas      ?Salsa de tomate taisha en sodio, salsa de barbacoa, aderezo para ensaladas, encurtidos de pepino, aceitunas, salsa de soya o miso      •Alimentos y líquidos ricos en azúcar:?Dulces, tortas, galletas, pasteles o donas      ?Refrescos (gaseosas), bebidas para deportistas o té endulzado      ?Mezclas enlatadas o secas para pasteles, sopas, salsas o salsas        ¿Qué otras pautas masoud seguir para tener un corazón barry?  •No fume.La nicotina y otros químicos contenidos en los cigarrillos y cigarros pueden causar daño a winnie pulmones y el corazón. Pida información a thakur médico si usted actualmente fuma y necesita ayuda para dejar de fumar. Los cigarrillos electrónicos o el tabaco sin humo igualmente contienen nicotina. Consulte con thakur médico antes de utilizar estos productos.      •Limite o no consuma bebidas alcohólicas, según indicaciones.El alcohol puede dañar thakur corazón y elevar thakur presión arterial. Thakur médico puede darle límites específicos diarios y semanales. El límite general recomendado es de 1 bebida por día para mujeres de 21 años o más y para hombres de 65 años o más. No tome más de 3 bebidas en un día o 7 en jazmyn semana. El límite recomendado es de 2 bebidas por día para los hombres de 21 a 64 años de edad. No tome más de 4 bebidas en un día o 14 en jazmyn semana. Un trago equivale a 12 onzas de cerveza, 5 onzas de vino o 1 onza y ½ de licor.      •Realice actividad física con regularidad.El ejercicio puede ayudarlo a mantener un peso saludable y mejorar thakur presión arterial y niveles de colesterol. El ejercicio regular también puede disminuir el riesgo de presentar problemas cardíacos. Pregunte a thakur médico acerca del mejor plan de ejercicio para usted. No empiece un programa de ejercicios sin antes consultar con thakur médico.  Caminar para ejercitarse           ACUERDOS SOBRE THAKUR CUIDADO:    Usted tiene el derecho de ayudar a planear thakur cuidado. Discuta winnie opciones de tratamiento con winnie médicos para decidir el cuidado que usted desea recibir. Usted siempre tiene el derecho de rechazar el tratamiento.       © Copyright [employer] 2020           back to top                          © Copyright [employer] 2020

## 2020-10-28 ENCOUNTER — EMERGENCY (EMERGENCY)
Facility: HOSPITAL | Age: 60
LOS: 1 days | Discharge: HOME | End: 2020-10-28
Attending: EMERGENCY MEDICINE | Admitting: EMERGENCY MEDICINE
Payer: COMMERCIAL

## 2020-10-28 VITALS
OXYGEN SATURATION: 98 % | TEMPERATURE: 98 F | DIASTOLIC BLOOD PRESSURE: 85 MMHG | SYSTOLIC BLOOD PRESSURE: 144 MMHG | RESPIRATION RATE: 18 BRPM | HEART RATE: 58 BPM

## 2020-10-28 VITALS
TEMPERATURE: 98 F | HEIGHT: 65 IN | RESPIRATION RATE: 16 BRPM | HEART RATE: 70 BPM | OXYGEN SATURATION: 95 % | SYSTOLIC BLOOD PRESSURE: 147 MMHG | DIASTOLIC BLOOD PRESSURE: 88 MMHG | WEIGHT: 175.93 LBS

## 2020-10-28 DIAGNOSIS — Z98.890 OTHER SPECIFIED POSTPROCEDURAL STATES: Chronic | ICD-10-CM

## 2020-10-28 LAB
ANION GAP SERPL CALC-SCNC: 9 MMOL/L — SIGNIFICANT CHANGE UP (ref 5–17)
BASOPHILS # BLD AUTO: 0.03 K/UL — SIGNIFICANT CHANGE UP (ref 0–0.2)
BASOPHILS NFR BLD AUTO: 0.3 % — SIGNIFICANT CHANGE UP (ref 0–2)
BUN SERPL-MCNC: 19 MG/DL — SIGNIFICANT CHANGE UP (ref 7–23)
CALCIUM SERPL-MCNC: 9.2 MG/DL — SIGNIFICANT CHANGE UP (ref 8.5–10.1)
CHLORIDE SERPL-SCNC: 107 MMOL/L — SIGNIFICANT CHANGE UP (ref 96–108)
CK MB BLD-MCNC: 0.9 % — SIGNIFICANT CHANGE UP (ref 0–3.5)
CK MB CFR SERPL CALC: 1.1 NG/ML — SIGNIFICANT CHANGE UP (ref 0–3.6)
CK SERPL-CCNC: 118 U/L — SIGNIFICANT CHANGE UP (ref 26–308)
CO2 SERPL-SCNC: 22 MMOL/L — SIGNIFICANT CHANGE UP (ref 22–31)
CREAT SERPL-MCNC: 0.97 MG/DL — SIGNIFICANT CHANGE UP (ref 0.5–1.3)
EOSINOPHIL # BLD AUTO: 0.04 K/UL — SIGNIFICANT CHANGE UP (ref 0–0.5)
EOSINOPHIL NFR BLD AUTO: 0.4 % — SIGNIFICANT CHANGE UP (ref 0–6)
GLUCOSE SERPL-MCNC: 102 MG/DL — HIGH (ref 70–99)
HCT VFR BLD CALC: 43.9 % — SIGNIFICANT CHANGE UP (ref 39–50)
HGB BLD-MCNC: 16 G/DL — SIGNIFICANT CHANGE UP (ref 13–17)
IMM GRANULOCYTES NFR BLD AUTO: 0.4 % — SIGNIFICANT CHANGE UP (ref 0–1.5)
INR BLD: 1.09 RATIO — SIGNIFICANT CHANGE UP (ref 0.88–1.16)
LYMPHOCYTES # BLD AUTO: 1.59 K/UL — SIGNIFICANT CHANGE UP (ref 1–3.3)
LYMPHOCYTES # BLD AUTO: 17.1 % — SIGNIFICANT CHANGE UP (ref 13–44)
MAGNESIUM SERPL-MCNC: 2.2 MG/DL — SIGNIFICANT CHANGE UP (ref 1.6–2.6)
MCHC RBC-ENTMCNC: 34.1 PG — HIGH (ref 27–34)
MCHC RBC-ENTMCNC: 36.4 GM/DL — HIGH (ref 32–36)
MCV RBC AUTO: 93.6 FL — SIGNIFICANT CHANGE UP (ref 80–100)
MONOCYTES # BLD AUTO: 0.47 K/UL — SIGNIFICANT CHANGE UP (ref 0–0.9)
MONOCYTES NFR BLD AUTO: 5.1 % — SIGNIFICANT CHANGE UP (ref 2–14)
NEUTROPHILS # BLD AUTO: 7.11 K/UL — SIGNIFICANT CHANGE UP (ref 1.8–7.4)
NEUTROPHILS NFR BLD AUTO: 76.7 % — SIGNIFICANT CHANGE UP (ref 43–77)
NRBC # BLD: 0 /100 WBCS — SIGNIFICANT CHANGE UP (ref 0–0)
PLATELET # BLD AUTO: 198 K/UL — SIGNIFICANT CHANGE UP (ref 150–400)
POTASSIUM SERPL-MCNC: 4.1 MMOL/L — SIGNIFICANT CHANGE UP (ref 3.5–5.3)
POTASSIUM SERPL-SCNC: 4.1 MMOL/L — SIGNIFICANT CHANGE UP (ref 3.5–5.3)
PROTHROM AB SERPL-ACNC: 12.7 SEC — SIGNIFICANT CHANGE UP (ref 10.6–13.6)
RBC # BLD: 4.69 M/UL — SIGNIFICANT CHANGE UP (ref 4.2–5.8)
RBC # FLD: 11.8 % — SIGNIFICANT CHANGE UP (ref 10.3–14.5)
SODIUM SERPL-SCNC: 138 MMOL/L — SIGNIFICANT CHANGE UP (ref 135–145)
TROPONIN I SERPL-MCNC: <.015 NG/ML — SIGNIFICANT CHANGE UP (ref 0.01–0.04)
WBC # BLD: 9.28 K/UL — SIGNIFICANT CHANGE UP (ref 3.8–10.5)
WBC # FLD AUTO: 9.28 K/UL — SIGNIFICANT CHANGE UP (ref 3.8–10.5)

## 2020-10-28 PROCEDURE — 82553 CREATINE MB FRACTION: CPT

## 2020-10-28 PROCEDURE — 36415 COLL VENOUS BLD VENIPUNCTURE: CPT

## 2020-10-28 PROCEDURE — 99284 EMERGENCY DEPT VISIT MOD MDM: CPT

## 2020-10-28 PROCEDURE — 99283 EMERGENCY DEPT VISIT LOW MDM: CPT

## 2020-10-28 PROCEDURE — 85025 COMPLETE CBC W/AUTO DIFF WBC: CPT

## 2020-10-28 PROCEDURE — 99285 EMERGENCY DEPT VISIT HI MDM: CPT

## 2020-10-28 PROCEDURE — 84484 ASSAY OF TROPONIN QUANT: CPT

## 2020-10-28 PROCEDURE — 82550 ASSAY OF CK (CPK): CPT

## 2020-10-28 PROCEDURE — 83735 ASSAY OF MAGNESIUM: CPT

## 2020-10-28 PROCEDURE — 80048 BASIC METABOLIC PNL TOTAL CA: CPT

## 2020-10-28 PROCEDURE — 85610 PROTHROMBIN TIME: CPT

## 2020-10-28 NOTE — ED ADULT NURSE NOTE - OBJECTIVE STATEMENT
Pt received in bed alert and oriented with the c/o mid chest pain which started since this morning. Pt states that the pain doesn't radiate anywhere and it is caussing great discomfort. As per Md's orders IV rowdy placed blood specimen obtained and sent to the lab. Nursing care ongoing and safety maintained.

## 2020-10-28 NOTE — ED ADULT NURSE NOTE - SUICIDE SCREENING QUESTION 2
Admission assessment complete as noted. Patient oriented to room and unit. Plan of care reviewed and patient verbalizes understanding. Questions encouraged and answered. Patent encouraged to call for needs or concerns. No

## 2020-10-28 NOTE — CONSULT NOTE ADULT - ATTENDING COMMENTS
I saw and examined the patient personally. Spoke with above provider regarding this case. I reviewed the above findings completely.  I agree with the above history, physical, and plan which I have edited where appropriate.     cp is atypical in nature. likely gerd. increase enalapril for bp. fu with dr marvin

## 2020-10-28 NOTE — ED ADULT TRIAGE NOTE - HEART RATE (BEATS/MIN)
fluticasone-salmeterol (ADVAIR DISKUS) 100-50 MCG/DOSE inhaler   Last Written Prescription Date:  4/29/2020  Last Fill Quantity: 1,   # refills: 2  Last Office Visit : 5/8/2020  Future Office visit:  None  1 Inhaler, 3 Refills sent to pharm 9/1/2020      Karuna Miller RN  Central Triage Red Flags/Med Refills     70

## 2020-10-28 NOTE — ED PROVIDER NOTE - OBJECTIVE STATEMENT
61 yo male with past medical history of hypertension and h.mei who presents c/o chest discomfort, SOB, and BP elevation since this morning. Patient states that he was in his usual state of health until this morning when he took his BP at home and noticed an SBP of 140. Patient had taken his BP medications (verapamil 120 mg and enalapril ) but was concerned that his BP was high. He decided to go to work, but started to feel what he describes as a "sharp, pinching" pain on the left side of his chest, nonreproducible, nonradiating in nature. He also admits to some mild dyspnea.  He denies any jaw pain, shoulder pain, or N/V. He took two ASA and decided to come to the ER for further eval. Patient came to the ER three days ago with similar complaints, but states that he was discharged home and told to follow up with his outpatient cardiologist Dr Sommer. Per chart review, patient was admitted in August 2020 for hypertensive urgency and continued on his verapamil dose.

## 2020-10-28 NOTE — ED PROVIDER NOTE - CARE PLAN
Principal Discharge DX:	Chest pain, unspecified type  Secondary Diagnosis:	Hypertension, unspecified type

## 2020-10-28 NOTE — ED PROVIDER NOTE - CARE PROVIDER_API CALL
Freddy Sommer)  [facility]  [address] [place] [address]  Phone: [phone]  Fax: [phone]  Follow Up Time:

## 2020-10-28 NOTE — ED PROVIDER NOTE - PATIENT PORTAL LINK FT
You can access the FollowMyHealth Patient Portal offered by [facility] by registering at the following website: [email or web address]. By joining [employer]’s FollowMyHealth portal, you will also be able to view your health information using other applications (apps) compatible with our system.

## 2020-10-28 NOTE — ED PROVIDER NOTE - ATTENDING CONTRIBUTION TO CARE
Adis MARCUS MD have performed the initial face to face bedside interview with this patient regarding history of present illness, review of symptoms and relevant past medical, social and family history.  I completed an independent physical examination.  I was the initial provider who evaluated this patient. I have have reviewed and discussed evaluation and management of patient with the ACP.  I have communicated any changes to the patient’s plan of care and disposition with the ACP. CP and elevated BP x few days. Had same in the past which was worked up and in fact is being followed by Dr. Sommer. Clear lungs, normal heart sounds and benign and non tender abdomen.

## 2020-10-28 NOTE — CONSULT NOTE ADULT - SUBJECTIVE AND OBJECTIVE BOX
[facility]         Ray Cox, Reginaldo, Lali, Ros, Benedict Veloz        [phone] (office)    Reason for Consult:      HPI: 60 year old male with PMH of GERD, htn, hld, obesity, kidney stones who presents to ED with episode of chest discomfort and sob. Patient woke up this morning and took his bp which was 134 systolic and went to work. He states he felt like his bp was high and he had sharp chest discomfort and sob for 30 minutes. He took his BP medications enalipril and verapamil and called EMS. He was given 2 asa by EMS. His SBP was measured to be 150 at that time. His symptoms resolved after taking his BP medications.  Denies any n/v, diaphoresis. He was in [facility] ED for similar episode 3 days ago and was discharged to follow up with outpt cardiologist. He follows with cardiologist Dr. Sommer.    Cardiac Summary  EK2020, Sinus bradycardia.   Stress Test: 2020, no Ischemia   Echo: 2020 Normal LV size and normal LV systolic function with LVEF 60-65%. Grade 1 LV diastolic dysfunction is present. Mild mitral regurgitation is present. Mild tricuspid regurgitation is present. No evidence of any pulmonary hypertension          PAST MEDICAL & SURGICAL HISTORY:  Helicobacter pylori gastritis    Hypertension    H/O hernia repair        SOCIAL HISTORY: nonsmoker, non drinker, works in [employer]     FAMILY HISTORY:  Denies any family history of stroke, MI, DM        Home Medications:  sucralfate 1 g/10 mL oral suspension: 10 milliliter(s) orally every 12 hours (25 Oct 2020 13:12)  verapamil 120 mg/24 hours oral capsule, extended release: 1 cap(s) orally 2 times a day (25 Oct 2020 13:12)  ZyrTEC 10 mg oral tablet: 1 tab(s) orally once a day (25 Oct 2020 13:12)      MEDICATIONS  (STANDING):    MEDICATIONS  (PRN):      Allergies    No Known Allergies    Intolerances        REVIEW OF SYSTEMS: Negative except as per HPI.    VITAL SIGNS:   Vital Signs Last 24 Hrs  T(C): 36.8 (28 Oct 2020 10:51), Max: 36.8 (28 Oct 2020 10:51)  T(F): 98.2 (28 Oct 2020 10:51), Max: 98.2 (28 Oct 2020 10:51)  HR: 76 (28 Oct 2020 11:08) (70 - 76)  BP: 157/76 (28 Oct 2020 11:08) (147/88 - 157/76)  BP(mean): --  RR: 21 (28 Oct 2020 11:08) (16 - 21)  SpO2: 98% (28 Oct 2020 11:08) (95% - 98%)    I&O's Summary      PHYSICAL EXAM:  Constitutional: NAD, well-developed  Pulmonary: Non-labored, breath sounds are clear bilaterally, no wheezing, rales or rhonchi  Cardiovascular: +S1, S2, RRR, no murmur  Gastrointestinal: Soft, nontender, nondistended, normoactive bowel sounds  Extremities: No peripheral edema   Neurological: Alert, strength and sensitivity are grossly intact  Skin: No obvious lesions/rashes  Psych: Mood & affect appropriate    LABS: All Labs Reviewed:                        16.0   9.28  )-----------( 198      ( 28 Oct 2020 11:59 )             43.9                         15.4   6.41  )-----------( 220      ( 25 Oct 2020 14:10 )             42.1     28 Oct 2020 11:59    138    |  107    |  19     ----------------------------<  102    4.1     |  22     |  0.97   25 Oct 2020 14:10    139    |  111    |  14     ----------------------------<  90     4.3     |  19     |  1.00     Ca    9.2        28 Oct 2020 11:59  Ca    8.7        25 Oct 2020 14:10  Mg     2.2       28 Oct 2020 11:59  Mg     2.1       25 Oct 2020 14:10    TPro  7.8    /  Alb  4.1    /  TBili  1.0    /  DBili  x      /  AST  21     /  ALT  27     /  AlkPhos  133    25 Oct 2020 14:10    PT/INR - ( 28 Oct 2020 11:59 )   PT: 12.7 sec;   INR: 1.09 ratio           CARDIAC MARKERS ( 28 Oct 2020 11:59 )  <.015 ng/mL / x     / 118 U/L / x     / 1.1 ng/mL      Blood Culture:         EKG: NSR

## 2020-10-28 NOTE — CONSULT NOTE ADULT - ASSESSMENT
60 year old male with PMH of htn, hld, obesity, kidney stones who presents to ED with episode of chest discomfort and sob. Cardio consulted for r/o ACS    - Doubt ACS, pain likely GERD vs musculoskeletal. No clear evidence of acute ischemia, trops negative x 2. will f/u third set, pt asymptomatic.   - No acute changes on EKG compared to previous  - Pt to follow up with cardio Dr. Sommer outpatient  - Pt stable for D/C home from cardio standpoint  - /76 here, continue home BP meds, monitor routine hemodynamics  - monitor and replete lytes, keep K>4, Mg>2  - Other cardiovascular workup will depend on clinical course.  - All other workup per primary team  - Will follow 60 year old male with PMH of htn, hld, obesity, kidney stones who presents to ED with episode of chest discomfort and sob. Cardio consulted for r/o ACS    - Doubt ACS, pain likely GERD.  No clear evidence of acute ischemia, trops negative x 1.  - EKG: NSR, no acute ischemic changes  - /76 here, continue home BP meds, monitor routine hemodynamics  - Pt to follow up with cardio Dr. Sommer outpatient  - Pt stable for D/C home from cardio standpoint

## 2020-11-03 DIAGNOSIS — Z72.3 LACK OF PHYSICAL EXERCISE: ICD-10-CM

## 2020-11-03 RX ORDER — HYDROXYZINE HYDROCHLORIDE 50 MG/1
50 TABLET ORAL
Refills: 0 | Status: DISCONTINUED | COMMUNITY
End: 2020-11-03

## 2020-11-09 ENCOUNTER — NON-APPOINTMENT (OUTPATIENT)
Age: 60
End: 2020-11-09

## 2020-11-09 ENCOUNTER — APPOINTMENT (OUTPATIENT)
Dept: CARDIOLOGY | Facility: CLINIC | Age: 60
End: 2020-11-09
Payer: COMMERCIAL

## 2020-11-09 VITALS
OXYGEN SATURATION: 99 % | HEART RATE: 57 BPM | HEIGHT: 65 IN | BODY MASS INDEX: 29.99 KG/M2 | DIASTOLIC BLOOD PRESSURE: 76 MMHG | WEIGHT: 180 LBS | SYSTOLIC BLOOD PRESSURE: 125 MMHG

## 2020-11-09 PROCEDURE — 99214 OFFICE O/P EST MOD 30 MIN: CPT

## 2020-11-09 PROCEDURE — 99072 ADDL SUPL MATRL&STAF TM PHE: CPT

## 2020-11-09 PROCEDURE — 93000 ELECTROCARDIOGRAM COMPLETE: CPT

## 2020-11-09 RX ORDER — FLUTICASONE PROPIONATE 50 UG/1
50 SPRAY, METERED NASAL
Qty: 16 | Refills: 0 | Status: DISCONTINUED | COMMUNITY
Start: 2020-09-29

## 2020-11-09 RX ORDER — VERAPAMIL HYDROCHLORIDE 120 MG/1
120 TABLET ORAL
Qty: 60 | Refills: 0 | Status: DISCONTINUED | COMMUNITY
Start: 2020-09-28

## 2020-11-09 RX ORDER — AMLODIPINE BESYLATE 5 MG/1
5 TABLET ORAL
Qty: 14 | Refills: 0 | Status: DISCONTINUED | COMMUNITY
Start: 2020-05-23

## 2020-11-09 RX ORDER — PANTOPRAZOLE 40 MG/1
40 TABLET, DELAYED RELEASE ORAL
Qty: 60 | Refills: 0 | Status: DISCONTINUED | COMMUNITY
Start: 2020-10-12

## 2020-11-09 RX ORDER — METOPROLOL TARTRATE 50 MG/1
50 TABLET, FILM COATED ORAL
Qty: 30 | Refills: 0 | Status: DISCONTINUED | COMMUNITY
Start: 2020-05-21

## 2020-12-08 ENCOUNTER — NON-APPOINTMENT (OUTPATIENT)
Age: 60
End: 2020-12-08

## 2020-12-09 ENCOUNTER — APPOINTMENT (OUTPATIENT)
Dept: CARDIOLOGY | Facility: CLINIC | Age: 60
End: 2020-12-09
Payer: COMMERCIAL

## 2020-12-09 VITALS
RESPIRATION RATE: 15 BRPM | HEART RATE: 71 BPM | BODY MASS INDEX: 29.32 KG/M2 | SYSTOLIC BLOOD PRESSURE: 148 MMHG | OXYGEN SATURATION: 99 % | DIASTOLIC BLOOD PRESSURE: 51 MMHG | HEIGHT: 65 IN | WEIGHT: 176 LBS

## 2020-12-09 VITALS — SYSTOLIC BLOOD PRESSURE: 130 MMHG | DIASTOLIC BLOOD PRESSURE: 74 MMHG

## 2020-12-09 DIAGNOSIS — Z00.00 ENCOUNTER FOR GENERAL ADULT MEDICAL EXAMINATION W/OUT ABNORMAL FINDINGS: ICD-10-CM

## 2020-12-09 PROCEDURE — 99214 OFFICE O/P EST MOD 30 MIN: CPT

## 2020-12-09 PROCEDURE — 99072 ADDL SUPL MATRL&STAF TM PHE: CPT

## 2020-12-10 LAB
ALBUMIN SERPL ELPH-MCNC: 4.8 G/DL
ALP BLD-CCNC: 109 U/L
ALT SERPL-CCNC: 30 U/L
ANION GAP SERPL CALC-SCNC: 11 MMOL/L
AST SERPL-CCNC: 28 U/L
BASOPHILS # BLD AUTO: 0.03 K/UL
BASOPHILS NFR BLD AUTO: 0.6 %
BILIRUB SERPL-MCNC: 0.9 MG/DL
BUN SERPL-MCNC: 14 MG/DL
CALCIUM SERPL-MCNC: 10.2 MG/DL
CHLORIDE SERPL-SCNC: 103 MMOL/L
CHOLEST SERPL-MCNC: 196 MG/DL
CO2 SERPL-SCNC: 25 MMOL/L
CREAT SERPL-MCNC: 1.01 MG/DL
EOSINOPHIL # BLD AUTO: 0.08 K/UL
EOSINOPHIL NFR BLD AUTO: 1.6 %
ESTIMATED AVERAGE GLUCOSE: 108 MG/DL
GLUCOSE SERPL-MCNC: 104 MG/DL
HBA1C MFR BLD HPLC: 5.4 %
HCT VFR BLD CALC: 44.9 %
HDLC SERPL-MCNC: 58 MG/DL
HGB BLD-MCNC: 15.3 G/DL
IMM GRANULOCYTES NFR BLD AUTO: 0.2 %
LDLC SERPL CALC-MCNC: 116 MG/DL
LYMPHOCYTES # BLD AUTO: 2.32 K/UL
LYMPHOCYTES NFR BLD AUTO: 45 %
MAN DIFF?: NORMAL
MCHC RBC-ENTMCNC: 33.8 PG
MCHC RBC-ENTMCNC: 34.1 GM/DL
MCV RBC AUTO: 99.3 FL
MONOCYTES # BLD AUTO: 0.33 K/UL
MONOCYTES NFR BLD AUTO: 6.4 %
NEUTROPHILS # BLD AUTO: 2.39 K/UL
NEUTROPHILS NFR BLD AUTO: 46.2 %
NONHDLC SERPL-MCNC: 138 MG/DL
PLATELET # BLD AUTO: 201 K/UL
POTASSIUM SERPL-SCNC: 4.9 MMOL/L
PROT SERPL-MCNC: 7.7 G/DL
RBC # BLD: 4.52 M/UL
RBC # FLD: 12 %
SODIUM SERPL-SCNC: 139 MMOL/L
TRIGL SERPL-MCNC: 112 MG/DL
TSH SERPL-ACNC: 3.19 UIU/ML
WBC # FLD AUTO: 5.16 K/UL

## 2020-12-13 ENCOUNTER — APPOINTMENT (OUTPATIENT)
Dept: DISASTER EMERGENCY | Facility: CLINIC | Age: 60
End: 2020-12-13

## 2020-12-13 DIAGNOSIS — Z01.818 ENCOUNTER FOR OTHER PREPROCEDURAL EXAMINATION: ICD-10-CM

## 2020-12-15 ENCOUNTER — TRANSCRIPTION ENCOUNTER (OUTPATIENT)
Age: 60
End: 2020-12-15

## 2020-12-15 LAB — SARS-COV-2 N GENE NPH QL NAA+PROBE: NOT DETECTED

## 2020-12-16 ENCOUNTER — OUTPATIENT (OUTPATIENT)
Dept: OUTPATIENT SERVICES | Facility: HOSPITAL | Age: 60
LOS: 1 days | End: 2020-12-16
Payer: COMMERCIAL

## 2020-12-16 ENCOUNTER — RESULT REVIEW (OUTPATIENT)
Age: 60
End: 2020-12-16

## 2020-12-16 DIAGNOSIS — R10.9 UNSPECIFIED ABDOMINAL PAIN: ICD-10-CM

## 2020-12-16 DIAGNOSIS — Z98.890 OTHER SPECIFIED POSTPROCEDURAL STATES: Chronic | ICD-10-CM

## 2020-12-16 DIAGNOSIS — Z12.11 ENCOUNTER FOR SCREENING FOR MALIGNANT NEOPLASM OF COLON: ICD-10-CM

## 2020-12-16 PROCEDURE — 88305 TISSUE EXAM BY PATHOLOGIST: CPT | Mod: 26

## 2020-12-16 PROCEDURE — 43239 EGD BIOPSY SINGLE/MULTIPLE: CPT

## 2020-12-16 PROCEDURE — 88312 SPECIAL STAINS GROUP 1: CPT

## 2020-12-16 PROCEDURE — 88342 IMHCHEM/IMCYTCHM 1ST ANTB: CPT | Mod: 26

## 2020-12-16 PROCEDURE — 88313 SPECIAL STAINS GROUP 2: CPT

## 2020-12-16 PROCEDURE — 88313 SPECIAL STAINS GROUP 2: CPT | Mod: 26

## 2020-12-16 PROCEDURE — 88312 SPECIAL STAINS GROUP 1: CPT | Mod: 26

## 2020-12-16 PROCEDURE — 45384 COLONOSCOPY W/LESION REMOVAL: CPT | Mod: PT

## 2020-12-16 PROCEDURE — 88342 IMHCHEM/IMCYTCHM 1ST ANTB: CPT

## 2020-12-16 PROCEDURE — 88305 TISSUE EXAM BY PATHOLOGIST: CPT

## 2020-12-18 LAB — SURGICAL PATHOLOGY STUDY: SIGNIFICANT CHANGE UP

## 2020-12-23 ENCOUNTER — APPOINTMENT (OUTPATIENT)
Dept: INTERNAL MEDICINE | Facility: CLINIC | Age: 60
End: 2020-12-23

## 2021-02-20 ENCOUNTER — EMERGENCY (EMERGENCY)
Facility: HOSPITAL | Age: 61
LOS: 1 days | Discharge: HOME | End: 2021-02-20
Attending: INTERNAL MEDICINE | Admitting: INTERNAL MEDICINE
Payer: COMMERCIAL

## 2021-02-20 VITALS
HEART RATE: 68 BPM | WEIGHT: 184.97 LBS | HEIGHT: 65 IN | TEMPERATURE: 98 F | SYSTOLIC BLOOD PRESSURE: 163 MMHG | RESPIRATION RATE: 16 BRPM | OXYGEN SATURATION: 99 % | DIASTOLIC BLOOD PRESSURE: 82 MMHG

## 2021-02-20 DIAGNOSIS — Z98.890 OTHER SPECIFIED POSTPROCEDURAL STATES: Chronic | ICD-10-CM

## 2021-02-20 PROCEDURE — 93010 ELECTROCARDIOGRAM REPORT: CPT

## 2021-02-20 PROCEDURE — 99285 EMERGENCY DEPT VISIT HI MDM: CPT

## 2021-02-20 RX ORDER — SUCRALFATE 1 G
10 TABLET ORAL
Qty: 0 | Refills: 0 | DISCHARGE

## 2021-02-20 NOTE — ED ADULT TRIAGE NOTE - CHIEF COMPLAINT QUOTE
" I felt dizziness, chest pain,  tremors and bloating 30 minutes after taking my enapril and verapamil at 9 pm"

## 2021-02-20 NOTE — ED ADULT NURSE NOTE - OBJECTIVE STATEMENT
A&OX4, c/o dizziness Chest pain & bloating approx 10p. No shortness of breath or diaphoresis. Per patient dizziness subsided & chest pain 3/10

## 2021-02-21 VITALS
OXYGEN SATURATION: 99 % | RESPIRATION RATE: 17 BRPM | DIASTOLIC BLOOD PRESSURE: 61 MMHG | HEART RATE: 77 BPM | SYSTOLIC BLOOD PRESSURE: 126 MMHG

## 2021-02-21 LAB
ALBUMIN SERPL ELPH-MCNC: 3.9 G/DL — SIGNIFICANT CHANGE UP (ref 3.3–5)
ALP SERPL-CCNC: 110 U/L — SIGNIFICANT CHANGE UP (ref 40–120)
ALT FLD-CCNC: 32 U/L — SIGNIFICANT CHANGE UP (ref 12–78)
ANION GAP SERPL CALC-SCNC: 7 MMOL/L — SIGNIFICANT CHANGE UP (ref 5–17)
APTT BLD: 27.2 SEC — LOW (ref 27.5–35.5)
AST SERPL-CCNC: 18 U/L — SIGNIFICANT CHANGE UP (ref 15–37)
BILIRUB SERPL-MCNC: 0.5 MG/DL — SIGNIFICANT CHANGE UP (ref 0.2–1.2)
BUN SERPL-MCNC: 16 MG/DL — SIGNIFICANT CHANGE UP (ref 7–23)
CALCIUM SERPL-MCNC: 8.8 MG/DL — SIGNIFICANT CHANGE UP (ref 8.5–10.1)
CHLORIDE SERPL-SCNC: 108 MMOL/L — SIGNIFICANT CHANGE UP (ref 96–108)
CO2 SERPL-SCNC: 25 MMOL/L — SIGNIFICANT CHANGE UP (ref 22–31)
CREAT SERPL-MCNC: 1 MG/DL — SIGNIFICANT CHANGE UP (ref 0.5–1.3)
GLUCOSE SERPL-MCNC: 104 MG/DL — HIGH (ref 70–99)
HCT VFR BLD CALC: 40.2 % — SIGNIFICANT CHANGE UP (ref 39–50)
HGB BLD-MCNC: 14.3 G/DL — SIGNIFICANT CHANGE UP (ref 13–17)
INR BLD: 1.08 RATIO — SIGNIFICANT CHANGE UP (ref 0.88–1.16)
MCHC RBC-ENTMCNC: 33.4 PG — SIGNIFICANT CHANGE UP (ref 27–34)
MCHC RBC-ENTMCNC: 35.6 GM/DL — SIGNIFICANT CHANGE UP (ref 32–36)
MCV RBC AUTO: 93.9 FL — SIGNIFICANT CHANGE UP (ref 80–100)
NRBC # BLD: 0 /100 WBCS — SIGNIFICANT CHANGE UP (ref 0–0)
PLATELET # BLD AUTO: 192 K/UL — SIGNIFICANT CHANGE UP (ref 150–400)
POTASSIUM SERPL-MCNC: 4.2 MMOL/L — SIGNIFICANT CHANGE UP (ref 3.5–5.3)
POTASSIUM SERPL-SCNC: 4.2 MMOL/L — SIGNIFICANT CHANGE UP (ref 3.5–5.3)
PROT SERPL-MCNC: 7.2 G/DL — SIGNIFICANT CHANGE UP (ref 6–8.3)
PROTHROM AB SERPL-ACNC: 12.6 SEC — SIGNIFICANT CHANGE UP (ref 10.6–13.6)
RBC # BLD: 4.28 M/UL — SIGNIFICANT CHANGE UP (ref 4.2–5.8)
RBC # FLD: 12 % — SIGNIFICANT CHANGE UP (ref 10.3–14.5)
SODIUM SERPL-SCNC: 140 MMOL/L — SIGNIFICANT CHANGE UP (ref 135–145)
TROPONIN I SERPL-MCNC: <.015 NG/ML — SIGNIFICANT CHANGE UP (ref 0.01–0.04)
TROPONIN I SERPL-MCNC: <.015 NG/ML — SIGNIFICANT CHANGE UP (ref 0.01–0.04)
WBC # BLD: 5.33 K/UL — SIGNIFICANT CHANGE UP (ref 3.8–10.5)
WBC # FLD AUTO: 5.33 K/UL — SIGNIFICANT CHANGE UP (ref 3.8–10.5)

## 2021-02-21 PROCEDURE — 85027 COMPLETE CBC AUTOMATED: CPT

## 2021-02-21 PROCEDURE — 84484 ASSAY OF TROPONIN QUANT: CPT

## 2021-02-21 PROCEDURE — 93005 ELECTROCARDIOGRAM TRACING: CPT

## 2021-02-21 PROCEDURE — 36415 COLL VENOUS BLD VENIPUNCTURE: CPT

## 2021-02-21 PROCEDURE — 71045 X-RAY EXAM CHEST 1 VIEW: CPT

## 2021-02-21 PROCEDURE — 80053 COMPREHEN METABOLIC PANEL: CPT

## 2021-02-21 PROCEDURE — 85610 PROTHROMBIN TIME: CPT

## 2021-02-21 PROCEDURE — 99284 EMERGENCY DEPT VISIT MOD MDM: CPT | Mod: 25

## 2021-02-21 PROCEDURE — 71045 X-RAY EXAM CHEST 1 VIEW: CPT | Mod: 26

## 2021-02-21 PROCEDURE — 85730 THROMBOPLASTIN TIME PARTIAL: CPT

## 2021-02-21 PROCEDURE — 96374 THER/PROPH/DIAG INJ IV PUSH: CPT

## 2021-02-21 RX ORDER — PANTOPRAZOLE SODIUM 20 MG/1
1 TABLET, DELAYED RELEASE ORAL
Qty: 30 | Refills: 0
Start: 2021-02-21 | End: 2021-03-22

## 2021-02-21 RX ORDER — ASPIRIN/CALCIUM CARB/MAGNESIUM 324 MG
325 TABLET ORAL DAILY
Refills: 0 | Status: DISCONTINUED | OUTPATIENT
Start: 2021-02-21 | End: 2021-02-24

## 2021-02-21 RX ORDER — PANTOPRAZOLE SODIUM 20 MG/1
40 TABLET, DELAYED RELEASE ORAL ONCE
Refills: 0 | Status: COMPLETED | OUTPATIENT
Start: 2021-02-21 | End: 2021-02-21

## 2021-02-21 RX ADMIN — PANTOPRAZOLE SODIUM 40 MILLIGRAM(S): 20 TABLET, DELAYED RELEASE ORAL at 00:42

## 2021-02-21 RX ADMIN — Medication 325 MILLIGRAM(S): at 00:32

## 2021-02-21 NOTE — ED PROVIDER NOTE - ADDITIONAL RISK FACTOR FREE TEXT BOX
atypical CP dizzy gerd SYMPTOMS         PLAN  R/O ACS atypical CP,  dizzy,   SYMPTOMS         PLAN  R/O ACS ASA Protonix serial enzymes f/u with cardiology and GI

## 2021-02-21 NOTE — ED ADULT NURSE REASSESSMENT NOTE - NS ED NURSE REASSESS COMMENT FT1
0500 Pt rested throughout the night, & when awake had no c/o chest pain, nausea or dizziness. For repeat Troponin blood work

## 2021-02-21 NOTE — ED PROVIDER NOTE - PROVIDER TOKENS
PROVIDER:[TOKEN:[[id number]],FOLLOWUP:[1-3 Days]],PROVIDER:[TOKEN:[[id number]],FOLLOWUP:[1-3 Days]]

## 2021-02-21 NOTE — ED ADULT NURSE REASSESSMENT NOTE - NS ED NURSE REASSESS COMMENT FT1
0545 blood drawn & sent to lab for troponin. Pending results. Pt denies chest pain or any discomfort.

## 2021-02-21 NOTE — ED PROVIDER NOTE - CARE PLAN
Principal Discharge DX:	Atypical chest pain  Secondary Diagnosis:	HTN (hypertension)  Secondary Diagnosis:	Dizzy  Secondary Diagnosis:	GERD (gastroesophageal reflux disease)

## 2021-02-21 NOTE — ED PROVIDER NOTE - CARE PROVIDER_API CALL
Jeff Cox (MD)  Cardiovascular Disease; Internal Medicine  [address] [place] [address]  Phone: [phone]  Fax: [phone]  Follow Up Time: 1-3 Days    Yosi Yang ()  Internal Medicine  [address] [place] [address]  Phone: [phone]  Fax: [phone]  Follow Up Time: 1-3 Days

## 2021-02-21 NOTE — ED PROVIDER NOTE - OBJECTIVE STATEMENT
" I felt dizziness, chest pain,  tremors and bloating 30 minutes after taking my enapril and verapamil at 9 pm"  see chief complaint quote 62 y/o male C/C  felt dizziness, epigastric and chest discomfort  similar to his GERD symptoms, normally takes Protonix. , he had tremors, felt bloated x 30 minutes , he took his  enalapril and verapamil at 9 pm 62 y/o male C/C  The patient felt dizziness, epigastric and chest discomfort  similar to his GERD symptoms, normally takes Protonix. , he had tremors, felt bloated x 30 minutes , he took his  enalapril and verapamil at 9 pm, his BP was elevated, by the time he came to the ED, his symptoms had resolved and his BP was normal. No CP, no SOB, no fever, no chills, no N/V/D, no COVID, no GIB no stroke symptoms. 60 y/o male C/C  The patient felt dizziness, epigastric and chest discomfort  similar to his GERD symptoms, normally takes Protonix. , he had tremors, felt bloated x 30 minutes , he noted that his BP was elevated and he  took his enalapril and verapamil at 9 pm,  BP in triage was 163/82, 154/76, his symptoms now resolving. His stomach bloating feels improved after the IV Protonix dosage.  No CP, no SOB, no fever, no chills, no N/V/D, no COVID, no GIB no stroke symptoms.

## 2021-02-21 NOTE — ED PROVIDER NOTE - PATIENT PORTAL LINK FT
You can access the FollowMyHealth Patient Portal offered by [facility] by registering at the following website: [email or web address]. By joining [employer]’s FollowMyHealth portal, you will also be able to view your health information using other applications (apps) compatible with our system.

## 2021-02-21 NOTE — ED ADULT NURSE REASSESSMENT NOTE - NS ED NURSE REASSESS COMMENT FT1
0035 Pt on cardiopulmonary monitor, sinus, was seen by ,  EkG was done & seen by same MD. Medicated with Aspirin 325 mg po, denies chest pain, no c/o nausea or dizziness  0040 IV  access inserted & blood drawn & sent to lab, and Protonix 40 mg IV administered. Monitored. Safety precautions observed as per protocol.

## 2021-02-21 NOTE — ED PROVIDER NOTE - SIGNIFICANT NEGATIVE FINDINGS
no headache, no syncope,  no SOB, no palpitations, no diaphoresis, no radiation no n/v,  no neuro changes.

## 2021-02-21 NOTE — ED PROVIDER NOTE - CLINICAL SUMMARY MEDICAL DECISION MAKING FREE TEXT BOX
atypical CP,  dizzy,   SYMPTOMS         PLAN  R/O ACS ASA Protonix serial enzymes f/u with cardiology and GI

## 2021-02-24 ENCOUNTER — NON-APPOINTMENT (OUTPATIENT)
Age: 61
End: 2021-02-24

## 2021-02-24 ENCOUNTER — APPOINTMENT (OUTPATIENT)
Dept: CARDIOLOGY | Facility: CLINIC | Age: 61
End: 2021-02-24
Payer: COMMERCIAL

## 2021-02-24 VITALS
BODY MASS INDEX: 29.82 KG/M2 | HEIGHT: 65 IN | SYSTOLIC BLOOD PRESSURE: 112 MMHG | DIASTOLIC BLOOD PRESSURE: 70 MMHG | WEIGHT: 179 LBS | OXYGEN SATURATION: 98 % | HEART RATE: 49 BPM

## 2021-02-24 PROCEDURE — 99214 OFFICE O/P EST MOD 30 MIN: CPT

## 2021-02-24 PROCEDURE — 99072 ADDL SUPL MATRL&STAF TM PHE: CPT

## 2021-02-24 PROCEDURE — 93000 ELECTROCARDIOGRAM COMPLETE: CPT

## 2021-04-09 ENCOUNTER — APPOINTMENT (OUTPATIENT)
Dept: OTOLARYNGOLOGY | Facility: CLINIC | Age: 61
End: 2021-04-09
Payer: COMMERCIAL

## 2021-04-09 VITALS
TEMPERATURE: 98.3 F | DIASTOLIC BLOOD PRESSURE: 80 MMHG | SYSTOLIC BLOOD PRESSURE: 116 MMHG | WEIGHT: 180 LBS | HEIGHT: 65 IN | BODY MASS INDEX: 29.99 KG/M2

## 2021-04-09 DIAGNOSIS — H92.02 OTALGIA, LEFT EAR: ICD-10-CM

## 2021-04-09 DIAGNOSIS — K13.79 OTHER LESIONS OF ORAL MUCOSA: ICD-10-CM

## 2021-04-09 DIAGNOSIS — Z87.19 PERSONAL HISTORY OF OTHER DISEASES OF THE DIGESTIVE SYSTEM: ICD-10-CM

## 2021-04-09 PROCEDURE — 99072 ADDL SUPL MATRL&STAF TM PHE: CPT

## 2021-04-09 PROCEDURE — 92563 TONE DECAY HEARING TEST: CPT

## 2021-04-09 PROCEDURE — 92557 COMPREHENSIVE HEARING TEST: CPT

## 2021-04-09 PROCEDURE — 99204 OFFICE O/P NEW MOD 45 MIN: CPT

## 2021-04-09 PROCEDURE — 92588 EVOKED AUDITORY TST COMPLETE: CPT

## 2021-04-09 PROCEDURE — 92570 ACOUSTIC IMMITANCE TESTING: CPT

## 2021-04-09 RX ORDER — ENALAPRIL MALEATE 20 MG/1
20 TABLET ORAL
Refills: 0 | Status: ACTIVE | COMMUNITY
Start: 2021-04-09

## 2021-04-09 RX ORDER — PANTOPRAZOLE 20 MG/1
20 TABLET, DELAYED RELEASE ORAL DAILY
Qty: 90 | Refills: 1 | Status: COMPLETED | COMMUNITY
End: 2021-04-09

## 2021-04-09 RX ORDER — VERAPAMIL HYDROCHLORIDE 120 MG/1
120 TABLET ORAL
Refills: 1 | Status: COMPLETED | COMMUNITY
End: 2021-04-09

## 2021-04-09 RX ORDER — ENALAPRIL MALEATE 20 MG/1
20 TABLET ORAL TWICE DAILY
Refills: 0 | Status: COMPLETED | COMMUNITY
End: 2021-04-09

## 2021-04-09 NOTE — ADDENDUM
[FreeTextEntry1] : Documented by Berna Luevano acting as scribe for Dr. Pedersen on 04/09/2021.\par \par All Medical record entries made by the Scribe were at my, Dr. Pedersen, direction and personally dictated by me on 04/09/2021 . I have reviewed the chart and agree that the record accurately reflects my personal performance of the history, physical exam, assessment and plan. I have also personally directed, reviewed, and agreed with the discharge instructions.

## 2021-04-09 NOTE — ASSESSMENT
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 Pedersen and reviewed with the patient. Arches Tinnitus Formula and Lipoflavonoids. ABR. FU after test.

## 2021-04-09 NOTE — REVIEW OF SYSTEMS
[Sneezing] : sneezing [Seasonal Allergies] : seasonal allergies [Ear Pain] : ear pain [Ear Itch] : ear itch [Dizziness] : dizziness [Vertigo] : vertigo [Ear Noises] : ear noises [Nasal Congestion] : nasal congestion [Negative] : Heme/Lymph [de-identified] : headaches

## 2021-04-09 NOTE — CONSULT LETTER
[Dear  ___] : Dear  [unfilled], [Consult Letter:] : I had the pleasure of evaluating your patient, [unfilled]. [Please see my note below.] : Please see my note below. [Consult Closing:] : Thank you very much for allowing me to participate in the care of this patient.  If you have any questions, please do not hesitate to contact me. [Sincerely,] : Sincerely, [FreeTextEntry3] : Leno Pedersen MD FACS

## 2021-04-09 NOTE — HISTORY OF PRESENT ILLNESS
[de-identified] : The patient presents with right tinnitus and left ear discomfort for about 6 weeks. Denies any curtis in hearing. Denies loud noise exposure, change in medication or head trauma. He gets occasional dizziness and headache. He had an MRI that was normal.

## 2021-04-09 NOTE — PHYSICAL EXAM
[] : tender bilaterally [Pablo Test Lateralizes To Left] : tone lateralization to the left [Normal] : no rashes [FreeTextEntry1] : elongated uvula, mild leukoplakia on the cheeks [FreeTextEntry2] : sinuses nontender to percussion

## 2021-04-15 ENCOUNTER — APPOINTMENT (OUTPATIENT)
Dept: OTOLARYNGOLOGY | Facility: CLINIC | Age: 61
End: 2021-04-15
Payer: COMMERCIAL

## 2021-04-15 PROCEDURE — 99072 ADDL SUPL MATRL&STAF TM PHE: CPT

## 2021-04-15 PROCEDURE — 92653 AEP NEURODIAGNOSTIC I&R: CPT

## 2021-05-03 ENCOUNTER — APPOINTMENT (OUTPATIENT)
Dept: OTOLARYNGOLOGY | Facility: CLINIC | Age: 61
End: 2021-05-03
Payer: COMMERCIAL

## 2021-05-03 VITALS
TEMPERATURE: 98.2 F | BODY MASS INDEX: 29.99 KG/M2 | WEIGHT: 180 LBS | HEIGHT: 65 IN | SYSTOLIC BLOOD PRESSURE: 107 MMHG | DIASTOLIC BLOOD PRESSURE: 73 MMHG | HEART RATE: 65 BPM

## 2021-05-03 DIAGNOSIS — H90.5 UNSPECIFIED SENSORINEURAL HEARING LOSS: ICD-10-CM

## 2021-05-03 DIAGNOSIS — H93.11 TINNITUS, RIGHT EAR: ICD-10-CM

## 2021-05-03 DIAGNOSIS — K21.9 GASTRO-ESOPHAGEAL REFLUX DISEASE W/OUT ESOPHAGITIS: ICD-10-CM

## 2021-05-03 DIAGNOSIS — K13.21 LEUKOPLAKIA OF ORAL MUCOSA, INCLUDING TONGUE: ICD-10-CM

## 2021-05-03 PROCEDURE — 99072 ADDL SUPL MATRL&STAF TM PHE: CPT

## 2021-05-03 PROCEDURE — 99213 OFFICE O/P EST LOW 20 MIN: CPT

## 2021-05-03 NOTE — CONSULT LETTER
[Dear  ___] : Dear  [unfilled], [Courtesy Letter:] : I had the pleasure of seeing your patient, [unfilled], in my office today. [Please see my note below.] : Please see my note below. [Consult Closing:] : Thank you very much for allowing me to participate in the care of this patient.  If you have any questions, please do not hesitate to contact me. [Sincerely,] : Sincerely, [FreeTextEntry3] : Leno Pedersen MD FACS

## 2021-05-03 NOTE — HISTORY OF PRESENT ILLNESS
[de-identified] : The patient presents with h/o right tinnitus and left ear discomfort. Pt presents today for results. He notices the tinnitus more when it's quiet.

## 2021-05-03 NOTE — ADDENDUM
[FreeTextEntry1] : Documented by Berna Luevano acting as scribe for Dr. Pedersen on 05/03/2021.\par \par All Medical record entries made by the Scribe were at my, Dr. Pedersen, direction and personally dictated by me on 05/03/2021 . I have reviewed the chart and agree that the record accurately reflects my personal performance of the history, physical exam, assessment and plan. I have also personally directed, reviewed, and agreed with the discharge instructions.

## 2021-05-03 NOTE — ASSESSMENT
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 Arches Tinnitus Formula. No Lipoflavonoids secondary to cardiac hx. Discussed r/b/a of pillow radio, white noise machine. FU 3 months.

## 2021-07-19 ENCOUNTER — EMERGENCY (EMERGENCY)
Facility: HOSPITAL | Age: 61
LOS: 1 days | Discharge: HOME | End: 2021-07-19
Attending: STUDENT IN AN ORGANIZED HEALTH CARE EDUCATION/TRAINING PROGRAM | Admitting: STUDENT IN AN ORGANIZED HEALTH CARE EDUCATION/TRAINING PROGRAM
Payer: COMMERCIAL

## 2021-07-19 VITALS
HEIGHT: 65 IN | DIASTOLIC BLOOD PRESSURE: 82 MMHG | WEIGHT: 186.95 LBS | SYSTOLIC BLOOD PRESSURE: 133 MMHG | RESPIRATION RATE: 16 BRPM | TEMPERATURE: 98 F | HEART RATE: 77 BPM | OXYGEN SATURATION: 99 %

## 2021-07-19 DIAGNOSIS — Z98.890 OTHER SPECIFIED POSTPROCEDURAL STATES: Chronic | ICD-10-CM

## 2021-07-19 PROCEDURE — 99282 EMERGENCY DEPT VISIT SF MDM: CPT

## 2021-07-19 PROCEDURE — 99283 EMERGENCY DEPT VISIT LOW MDM: CPT

## 2021-07-19 RX ORDER — DIPHENHYDRAMINE HCL 50 MG
1 CAPSULE ORAL
Qty: 21 | Refills: 0
Start: 2021-07-19 | End: 2021-07-25

## 2021-07-19 RX ORDER — FAMOTIDINE 10 MG/ML
1 INJECTION INTRAVENOUS
Qty: 60 | Refills: 0
Start: 2021-07-19 | End: 2021-08-17

## 2021-07-19 NOTE — ED ADULT NURSE NOTE - OBJECTIVE STATEMENT
Received pt in bed alert and oriented x4.  C/O generalized diffuse rash starting yesterday.  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 Pt denies difficulty swallowing or throat swelling or any respiratory complaints.  Pt reports itchiness.  pt has not treated at home with any medication.

## 2021-07-19 NOTE — ED PROVIDER NOTE - NSFOLLOWUPINSTRUCTIONS_ED_ALL_ED_FT
Please follow up with your Primary Care Physician as soon as possible.  Please take your medications as prescribed.  If your symptoms persist or worsen, please seek care. Either return to the Emergency Department, go to urgent care or see your primary care doctor.     ====================================================================   Acute Rash    WHAT YOU NEED TO KNOW:    A rash is irritation, redness, or itchiness in the skin or mucus membranes. Mucus membranes are areas such as the lining of your nose or throat. Acute means the rash starts suddenly, worsens quickly, and lasts a short time. An acute rash may be caused by a disease, such as hepatitis or vasculitis. The rash may be a reaction to something you are allergic to, such as certain foods, or latex. Certain medicines, including antibiotics, NSAIDs, prescription pain medicine, and aspirin can also cause a rash.     DISCHARGE INSTRUCTIONS:    Return to the emergency department if:     You have sudden trouble breathing or chest pain.      You are vomiting, have a headache or muscle aches, and your throat hurts.    Contact your healthcare provider if:     You have a fever.      You get open wounds from scratching your skin, or you have a wound that is red, swollen, or painful.       Your rash lasts longer than 3 months.      You have swelling or pain in your joints.       You have questions or concerns about your condition or care.    Medicines: If your rash does not go away on its own, you may need the following medicines:     Antihistamines may be given to help decrease itching.       Steroids may be given to decrease inflammation.      Antibiotics help fight or prevent a bacterial infection.       Take your medicine as directed. Contact your healthcare provider if you think your medicine is not helping or if you have side effects. Tell him of her if you are allergic to any medicine. Keep a list of the medicines, vitamins, and herbs you take. Include the amounts, and when and why you take them. Bring the list or the pill bottles to follow-up visits. Carry your medicine list with you in case of an emergency.    Prevent a rash or care for your skin when you have a rash: Dry skin can lead to more problems. Do not scratch your skin if it itches. You may cause a skin infection by scratching. The following may prevent dry skin, and help your skin look better:     Use thick cream lotions or petroleum jelly to help soothe your rash. These products work well on areas with thick skin, such as your feet. Cool compresses may also be used to soothe your skin. Apply a cool compress or a cool, wet towel, and then cover it with a dry towel.       Use lukewarm water when you bathe. Hot water may damage your skin more. Pat your skin dry. Do not rub your skin with a towel.       Use detergents, soaps, shampoos, and bubble baths made for sensitive skin. Wear clothes that are made of cotton instead of nylon or wool. Cotton is softer, so it will not hurt your skin as much.    Follow up with your healthcare provider as directed: You may need to see a dermatologist if healthcare providers do not know what is causing your rash. You may also need to see a dermatologist if your rash does not get better even with treatment. You may need to see a dietitian if you have allergies to foods. Write down your questions so you remember to ask them during your visits.

## 2021-07-19 NOTE — ED PROVIDER NOTE - PATIENT PORTAL LINK FT
You can access the FollowMyHealth Patient Portal offered by [facility] by registering at the following website: [email or web address]. By joining [employer]’s FollowMyHealth portal, you will also be able to view your health information using other applications (apps) compatible with our system.

## 2021-07-19 NOTE — ED PROVIDER NOTE - OBJECTIVE STATEMENT
61 m history of hypertension, hyperlipidemia, GERD here complaining of diffuse itchy rash. started 3 days ago. 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 No other new soaps, lotions, medications. No fevers, chills, nausea, vomiting, difficulty breathing or swallowing. Has some reflux. tried calamine lotion without relief.

## 2021-07-19 NOTE — ED PROVIDER NOTE - NSFOLLOWUPCLINICS_GEN_ALL_ED_FT
[facility]  Allergy  [address] [place] [address]  Phone: [phone]  Fax:   Follow Up Time: Routine

## 2021-07-19 NOTE — ED PROVIDER NOTE - CLINICAL SUMMARY MEDICAL DECISION MAKING FREE TEXT BOX
here with rash, likely from new laundry detergent at work- will discharge with prednisone, benadryl and Pepcid.

## 2021-07-19 NOTE — ED PROVIDER NOTE - NS ED ROS FT
Constitutional: No fever.  Neurological: No headache.  Eyes: No vision changes.   Ears, Nose, Mouth, Throat: No congestion.  Cardiovascular: No chest pain.  Respiratory: No difficulty breathing.  Gastrointestinal: No nausea or vomiting.  Genitourinary: No dysuria.  Musculoskeletal: No joint pain.  Integumentary (skin and/or breast): +rash.

## 2021-07-19 NOTE — ED PROVIDER NOTE - PHYSICAL EXAMINATION
Vital signs as available reviewed.  General:  Comfortable, no acute distress.  Head:  Normocephalic, atraumatic.  Eyes:  Conjunctiva pink, no icterus.  ENMT: no pharyngeal swelling.  Cardiovascular:  Regular rate, no obvious murmur.  Respiratory:  Clear to auscultation, good air entry bilaterally.  Abdomen:  Soft, non-tender.  Musculoskeletal:  No deformity or calf tenderness.  Neurologic: Alert and oriented, moving all extremities.  Skin:  diffuse urticarial rash.

## 2021-08-02 ENCOUNTER — APPOINTMENT (OUTPATIENT)
Dept: OTOLARYNGOLOGY | Facility: CLINIC | Age: 61
End: 2021-08-02

## 2021-09-29 ENCOUNTER — APPOINTMENT (OUTPATIENT)
Dept: CARDIOLOGY | Facility: CLINIC | Age: 61
End: 2021-09-29
Payer: COMMERCIAL

## 2021-09-29 ENCOUNTER — NON-APPOINTMENT (OUTPATIENT)
Age: 61
End: 2021-09-29

## 2021-09-29 VITALS
BODY MASS INDEX: 30.82 KG/M2 | OXYGEN SATURATION: 97 % | HEIGHT: 65 IN | HEART RATE: 58 BPM | SYSTOLIC BLOOD PRESSURE: 118 MMHG | WEIGHT: 185 LBS | DIASTOLIC BLOOD PRESSURE: 75 MMHG

## 2021-09-29 PROCEDURE — 99214 OFFICE O/P EST MOD 30 MIN: CPT

## 2021-09-29 PROCEDURE — 93000 ELECTROCARDIOGRAM COMPLETE: CPT

## 2021-10-19 ENCOUNTER — EMERGENCY (EMERGENCY)
Facility: HOSPITAL | Age: 61
LOS: 1 days | Discharge: HOME | End: 2021-10-19
Attending: STUDENT IN AN ORGANIZED HEALTH CARE EDUCATION/TRAINING PROGRAM | Admitting: STUDENT IN AN ORGANIZED HEALTH CARE EDUCATION/TRAINING PROGRAM
Payer: COMMERCIAL

## 2021-10-19 VITALS
OXYGEN SATURATION: 96 % | TEMPERATURE: 98 F | HEART RATE: 88 BPM | DIASTOLIC BLOOD PRESSURE: 80 MMHG | WEIGHT: 182.98 LBS | SYSTOLIC BLOOD PRESSURE: 133 MMHG | HEIGHT: 65 IN | RESPIRATION RATE: 18 BRPM

## 2021-10-19 DIAGNOSIS — Z98.890 OTHER SPECIFIED POSTPROCEDURAL STATES: Chronic | ICD-10-CM

## 2021-10-19 PROCEDURE — 99284 EMERGENCY DEPT VISIT MOD MDM: CPT

## 2021-10-19 NOTE — ED ADULT NURSE NOTE - CHPI ED NUR SYMPTOMS NEG
no body aches/no chills/no diaphoresis/no edema/no headache/no hemoptysis/no shortness of breath/no wheezing

## 2021-10-19 NOTE — ED ADULT NURSE NOTE - NSFALLRSKUNASSIST_ED_ALL_ED
I believe this is a contact dermatitis  I recommended we treat him with a mild steroid cream and warm compresses  I recommended desonide 0 05% cream to be applied sparingly to the left eyelid twice a day  Patient's father has this at home  He develops the same condition  I told him to go ahead and use the desonide that they have at home  Warm compresses should be used 3 times per day for 15 minutes at a time until improved  When he does improve, the desonide cream should be discontinued  Return to office if no improvement or worsens  no

## 2021-10-19 NOTE — ED ADULT NURSE NOTE - OBJECTIVE STATEMENT
Patient alert and oriented X 3. Complaining of cough, congestion, sore throat 4 days.  Denies chest pain, shortness of breath, nausea, vomiting, headache, dizziness and fever. Lungs clears bilaterally. Respirations even and not labored. Abdomen soft non tender. Tylenol last taken prior to arrival. Patient primary medical doctor prescribed Flonase and zyrtec.

## 2021-10-20 VITALS
DIASTOLIC BLOOD PRESSURE: 75 MMHG | RESPIRATION RATE: 16 BRPM | HEART RATE: 87 BPM | SYSTOLIC BLOOD PRESSURE: 118 MMHG | TEMPERATURE: 99 F | OXYGEN SATURATION: 95 %

## 2021-10-20 LAB
RAPID RVP RESULT: DETECTED
RSV RNA SPEC QL NAA+PROBE: DETECTED
S PYO DNA THROAT QL NAA+PROBE: SIGNIFICANT CHANGE UP
SARS-COV-2 RNA SPEC QL NAA+PROBE: SIGNIFICANT CHANGE UP

## 2021-10-20 PROCEDURE — 87651 STREP A DNA AMP PROBE: CPT

## 2021-10-20 PROCEDURE — 71046 X-RAY EXAM CHEST 2 VIEWS: CPT

## 2021-10-20 PROCEDURE — 87798 DETECT AGENT NOS DNA AMP: CPT

## 2021-10-20 PROCEDURE — 0225U NFCT DS DNA&RNA 21 SARSCOV2: CPT

## 2021-10-20 PROCEDURE — 99283 EMERGENCY DEPT VISIT LOW MDM: CPT | Mod: 25

## 2021-10-20 PROCEDURE — 71046 X-RAY EXAM CHEST 2 VIEWS: CPT | Mod: 26

## 2021-10-20 RX ORDER — ALBUTEROL 90 UG/1
2 AEROSOL, METERED ORAL
Qty: 1 | Refills: 0
Start: 2021-10-20 | End: 2021-11-18

## 2021-10-20 NOTE — ED PROVIDER NOTE - NSFOLLOWUPINSTRUCTIONS_ED_ALL_ED_FT
Please take the medication as prescribed and follow up with your primary care doctor.  Return to the ER for persistent cough, fever, sore throat, shortness of breath, respiratory distress, or any other concerns. Please take the medication as prescribed and follow up with your primary care doctor.  Return to the ER for persistent cough, fever, sore throat, shortness of breath, respiratory distress, or any other concerns.      Infección respiratoria superior    CUIDADO AMBULATORIO:    Jazmyn infección de las vías respiratorias superiorestambién se conoce bowen resfriado. Hernadez nariz, garganta, oídos y senos nasales pueden verse afectados. Usted está más propenso a contraer un resfriado en el invierno. Hernadez riesgo de contraer un resfriado puede aumentar si usted fuma cigarrillos o padece de alergias, bowen la fiebre del heno.    ¿Qué causa un resfriado?Un resfriado es causado por un virus. Muchos virus pueden causar un resfriado, y cada zoie es contagioso. [place] quiere decir que el virus puede transmitirse fácilmente a otra persona cuando jazmyn persona enferma tose o estornuda. El virus también se puede propagar si toca un objeto en el que esté el virus y luego se toca los ojos, la boca o la nariz.    Los síntomas de resfriadosuelen empeorar althea los primeros 3 a 5 emily. Puede presentar cualquiera de los siguientes signos o síntomas:  •Secreción nasal o nariz tapada      •Estornudos y tos      •Garganta irritada o ronquera      •Ojos enrojecidos, llorosos e irritados      •Fatiga (más cansancio que de costumbre)      •Escalofríos y fiebre      •Dolor de star, valeria corporales o músculos adoloridos      Llame al número de emergencias local (911 en los [place]) si:  •Usted tiene dolor torácico y dificultad para respirar.          Busque atención médica de inmediato si:  •Usted tiene fiebre más anaya de 102 ºF (39 ºC).          Llame a hernadez médico si:  •Tiene fiebre baja.      •Hernadez garganta irritada empeora o usted ve manchas juanito o zev en hernadez garganta.      •Rayne síntomas empeoran después de 3 a 5 días o no mejora en 14 días.      •Usted tiene sarpullido en alguna parte de hernadez piel.      •Usted tiene bultos grandes y sensibles en hernadez yovany      •Usted tiene secreción espesa de color anna o amarillo proveniente de hernadez nariz.      •Usted expectora mucosidad de color amarillo, anna o con serenity.      •Usted tiene un grave dolor de oído.      •Usted tiene preguntas o inquietudes acerca de hernadez condición o cuidado.      Tratamiento:Los resfriados son provocados por virus y no mejoran con antibióticos. La mayoría de las personas mejoran en 7 a 14 días. Puede que usted siga tosiendo por 2 a 3 semanas. Lo siguiente podría ayudar a disminuir rayne síntomas:  •Los descongestivosayudan a reducir la congestión nasal y ayudan a respirar más fácilmente. Si usted manny pastillas descongestionantes, es probable que le causen inquietud o que usted no pueda dormir. No use aerosol descongestionante por más de unos cuantos días.      •Los jarabes para la tosayudan a reducir la tos. Pregúntele a hernadez médico cuál tipo de medicamento para la tos es mejor para usted.      •Los CARLOS,bowen el ibuprofeno, ayudan a disminuir la inflamación, el dolor y la fiebre. Los CARLOS pueden causar sangrado estomacal o problemas renales en ciertas personas. Si usted manny un medicamento anticoagulante, siempre pregúntele a hernadez médico si los CARLOS son seguros para usted. Siempre jacinto la etiqueta de jeanie medicamento y siga las instrucciones.      •Acetaminofénalivia el dolor y baja la fiebre. Está disponible sin receta médica. Pregunte la cantidad y la frecuencia con que debe tomarlos. Siga las indicaciones. Jacinto las etiquetas de todos los demás medicamentos que esté usando para saber si también contienen acetaminofén, o pregunte a hernadez médico o farmacéutico. El acetaminofén puede causar daño en el hígado cuando no se manny de forma correcta. No use más de 4 gramos (4000 miligramos) en total de acetaminofeno en un día.      Controle hernadez resfriado:  •Descanse el mayor tiempo posible.Empiece a hacer un poco más día a día.      •[place] más líquidos bowen se le indique.Los líquidos le ayudarán a aflojar y disolver la mucosidad para que la pueda expectorar. Los líquidos ayudarán a evitar la deshidratación. Los líquidos que ayudan a prevenir la deshidratación pueden ser agua, jugo de fruta y caldo. No tome líquidos que contienen cafeína. La cafeína puede aumentar el riesgo de deshidratación. Pregúntele a hernadez médico cuál es la cantidad de líquido que necesita ingerir a diario.      •Alivie el dolor de garganta.Malvin gárgaras de agua tibia con sal. Prepare agua salina disolviendo ¼ de cucharada de sal a 1 taza de agua tibia. Usted puede también chupar dulces duros o pastillas para la garganta. Usted puede usar aerosol para el dolor de garganta.      •Use un humidificador o vaporizador.Use un humidificador de linda frío o un vaporizador para elevar la humedad en hernadez casa. [place] podría ayudarle a respirar más fácilmente y al mismo tiempo disminuir la tos.      •Use gotas nasales de solución salina bowen se le haya indicado.Estas ayudan a aliviar la congestión.      •Aplique vaselina en la parte externa alrededor de las fosas nasales.Esta puede disminuir la irritación de sonarse la nariz.      •No fume.La nicotina y otros químicos en los cigarrillos y cigarros pueden empeorar rayne síntomas. También pueden causar infecciones bowen la bronquitis o la neumonía. Pida información a hernadez médico si usted actualmente fuma y necesita ayuda para dejar de fumar. Los cigarrillos electrónicos o el tabaco sin humo igualmente contienen nicotina. Consulte con hernadez médico antes de utilizar estos productos.      Evite un resfriado:  •Lávese las sophie frecuentemente.Use agua y jabón cada vez que se lave las sophie. Frótese las sophie enjabonadas, entrelazando los dedos. Use los dedos de jazmyn mano para restregar debajo de las uñas de la otra mano. Lávese althea al menos 20 segundos. Enjuague con agua corriente caliente althea varios segundos. Luego séquese las sophie. Use gel antibacterial si no tiene agua y jabón a hernadez disponibilidad. No se toque los ojos o la boca sin antes lavarse las sophie.   Lavado de sophie           •Cúbrase al toser o estornudar.Use un pañuelo que cubra la boca y la nariz. Deseche el pañuelo usado de inmediato en la basura. Use el ángulo del brazo si no tiene un pañuelo disponible. Lávese las sophie con agua y jabón o use un desinfectante de sophie. No se pare cerca de nadie que esté estornudando o tosiendo.      •Trate de mantenerse alejado de los demás mientras esté enfermo.[place]old[place] es especialmente importante althea los primeros 2 o 3 días, cuando el virus se propaga más fácilmente. Espere hasta que la fiebre, la tos u otros síntomas desaparezcan antes de volver al trabajo u otras actividades regulares.      •No comparta objetos mientras esté enfermo.[place]oldt incluye alimentos, bebidas, utensilios para comer y platos.      Acuda a la consulta de control con hernadez médico según las indicaciones:Anote rayne preguntas para que se acuerde de hacerlas althea rayne visitas.

## 2021-10-20 NOTE — ED PROVIDER NOTE - CARE PROVIDER_API CALL
Gene Machado (DO)  Internal Medicine  [address] [place] [address]  Phone: [phone]  Fax: [phone]  Follow Up Time:    Byron Coats  [facility]  [address] [place] [address]  Phone: [phone]  Fax: [phone]  Follow Up Time:

## 2021-10-20 NOTE — ED PROVIDER NOTE - OBJECTIVE STATEMENT
61 year old male with a history of HTN, gastritis presents with cough, nasal/chest congestion, and sore throat x 4-5 days.  Patient reports dry cough and b/l sore throat that is worse when coughing.  It is associated with intermittent low-grade temps, Tmax 100.2.  He last took Tylenol at 10pm last night.  Yesterday, he saw his PMD who gave patient Zyrtec and Flonase which has not provided any relief.  Patient denies headache, chest pain, SOB.  He has received his COVID vaccine.  No sick contacts.  PMD Dr. Coats

## 2021-10-20 NOTE — ED PROVIDER NOTE - CLINICAL SUMMARY MEDICAL DECISION MAKING FREE TEXT BOX
61 year old male with dry cough, nasal/chest congestion, low-grade fever, and sore throat x 4 days.  Seen by PMD yesterday, taking Zyrtec and Flonase without relief.  CXR, RVP/COVID, strep, symptomatic relief with anti-tussive, reassess

## 2021-10-20 NOTE — ED PROVIDER NOTE - HISTORY ATTESTATION, MLM
Patient called looking for CT results from 7/30. Writer informed her that office would reach out once Dr Hoover had chance to review results.     Also aware Dr Hoover is out of office this week.     Patient can be reached at [phone]   I have reviewed and confirmed nurses' notes...

## 2021-10-20 NOTE — ED PROVIDER NOTE - PROGRESS NOTE DETAILS
Results of work  up discussed with patient.  He will follow up with PMD.  Looks comfortable, no wheezing, no respiratory distress

## 2021-10-20 NOTE — ED PROVIDER NOTE - PATIENT PORTAL LINK FT
You can access the FollowMyHealth Patient Portal offered by [facility] by registering at the following website: [email or web address]. By joining [facility]’s FollowMyHealth portal, you will also be able to view your health information using other applications (apps) compatible with our system.

## 2022-03-29 ENCOUNTER — APPOINTMENT (OUTPATIENT)
Dept: CARDIOLOGY | Facility: CLINIC | Age: 62
End: 2022-03-29
Payer: COMMERCIAL

## 2022-03-29 ENCOUNTER — NON-APPOINTMENT (OUTPATIENT)
Age: 62
End: 2022-03-29

## 2022-03-29 VITALS
HEART RATE: 58 BPM | HEIGHT: 65 IN | SYSTOLIC BLOOD PRESSURE: 121 MMHG | OXYGEN SATURATION: 96 % | DIASTOLIC BLOOD PRESSURE: 72 MMHG | BODY MASS INDEX: 32.99 KG/M2 | WEIGHT: 198 LBS

## 2022-03-29 PROCEDURE — 99214 OFFICE O/P EST MOD 30 MIN: CPT

## 2022-03-29 PROCEDURE — 93000 ELECTROCARDIOGRAM COMPLETE: CPT

## 2022-08-02 ENCOUNTER — EMERGENCY (EMERGENCY)
Facility: HOSPITAL | Age: 62
LOS: 1 days | Discharge: HOME | End: 2022-08-02
Attending: EMERGENCY MEDICINE | Admitting: EMERGENCY MEDICINE
Payer: COMMERCIAL

## 2022-08-02 VITALS
DIASTOLIC BLOOD PRESSURE: 83 MMHG | RESPIRATION RATE: 15 BRPM | OXYGEN SATURATION: 97 % | HEART RATE: 69 BPM | TEMPERATURE: 98 F | SYSTOLIC BLOOD PRESSURE: 128 MMHG

## 2022-08-02 VITALS
WEIGHT: 195.99 LBS | RESPIRATION RATE: 16 BRPM | SYSTOLIC BLOOD PRESSURE: 159 MMHG | HEIGHT: 65 IN | OXYGEN SATURATION: 98 % | HEART RATE: 71 BPM | DIASTOLIC BLOOD PRESSURE: 90 MMHG | TEMPERATURE: 98 F

## 2022-08-02 DIAGNOSIS — Z98.890 OTHER SPECIFIED POSTPROCEDURAL STATES: Chronic | ICD-10-CM

## 2022-08-02 LAB
ALBUMIN SERPL ELPH-MCNC: 4 G/DL — SIGNIFICANT CHANGE UP (ref 3.3–5)
ALP SERPL-CCNC: 68 U/L — SIGNIFICANT CHANGE UP (ref 40–120)
ALT FLD-CCNC: 39 U/L — SIGNIFICANT CHANGE UP (ref 12–78)
ANION GAP SERPL CALC-SCNC: 6 MMOL/L — SIGNIFICANT CHANGE UP (ref 5–17)
AST SERPL-CCNC: 74 U/L — HIGH (ref 15–37)
BASOPHILS # BLD AUTO: 0.04 K/UL — SIGNIFICANT CHANGE UP (ref 0–0.2)
BASOPHILS NFR BLD AUTO: 0.7 % — SIGNIFICANT CHANGE UP (ref 0–2)
BILIRUB SERPL-MCNC: 0.5 MG/DL — SIGNIFICANT CHANGE UP (ref 0.2–1.2)
BUN SERPL-MCNC: 16 MG/DL — SIGNIFICANT CHANGE UP (ref 7–23)
CALCIUM SERPL-MCNC: 9 MG/DL — SIGNIFICANT CHANGE UP (ref 8.5–10.1)
CHLORIDE SERPL-SCNC: 110 MMOL/L — HIGH (ref 96–108)
CO2 SERPL-SCNC: 22 MMOL/L — SIGNIFICANT CHANGE UP (ref 22–31)
CREAT SERPL-MCNC: 0.93 MG/DL — SIGNIFICANT CHANGE UP (ref 0.5–1.3)
D DIMER BLD IA.RAPID-MCNC: <150 NG/ML DDU — SIGNIFICANT CHANGE UP
EGFR: 92 ML/MIN/1.73M2 — SIGNIFICANT CHANGE UP
EOSINOPHIL # BLD AUTO: 0.15 K/UL — SIGNIFICANT CHANGE UP (ref 0–0.5)
EOSINOPHIL NFR BLD AUTO: 2.6 % — SIGNIFICANT CHANGE UP (ref 0–6)
GLUCOSE SERPL-MCNC: 113 MG/DL — HIGH (ref 70–99)
HCT VFR BLD CALC: 40.5 % — SIGNIFICANT CHANGE UP (ref 39–50)
HGB BLD-MCNC: 14.2 G/DL — SIGNIFICANT CHANGE UP (ref 13–17)
IMM GRANULOCYTES NFR BLD AUTO: 0.3 % — SIGNIFICANT CHANGE UP (ref 0–1.5)
LYMPHOCYTES # BLD AUTO: 2.73 K/UL — SIGNIFICANT CHANGE UP (ref 1–3.3)
LYMPHOCYTES # BLD AUTO: 46.7 % — HIGH (ref 13–44)
MAGNESIUM SERPL-MCNC: 2.2 MG/DL — SIGNIFICANT CHANGE UP (ref 1.6–2.6)
MCHC RBC-ENTMCNC: 33.2 PG — SIGNIFICANT CHANGE UP (ref 27–34)
MCHC RBC-ENTMCNC: 35.1 GM/DL — SIGNIFICANT CHANGE UP (ref 32–36)
MCV RBC AUTO: 94.6 FL — SIGNIFICANT CHANGE UP (ref 80–100)
MONOCYTES # BLD AUTO: 0.46 K/UL — SIGNIFICANT CHANGE UP (ref 0–0.9)
MONOCYTES NFR BLD AUTO: 7.9 % — SIGNIFICANT CHANGE UP (ref 2–14)
NEUTROPHILS # BLD AUTO: 2.45 K/UL — SIGNIFICANT CHANGE UP (ref 1.8–7.4)
NEUTROPHILS NFR BLD AUTO: 41.8 % — LOW (ref 43–77)
NRBC # BLD: 0 /100 WBCS — SIGNIFICANT CHANGE UP (ref 0–0)
PLATELET # BLD AUTO: 244 K/UL — SIGNIFICANT CHANGE UP (ref 150–400)
POTASSIUM SERPL-MCNC: 4.2 MMOL/L — SIGNIFICANT CHANGE UP (ref 3.5–5.3)
POTASSIUM SERPL-MCNC: 6.6 MMOL/L — CRITICAL HIGH (ref 3.5–5.3)
POTASSIUM SERPL-SCNC: 4.2 MMOL/L — SIGNIFICANT CHANGE UP (ref 3.5–5.3)
POTASSIUM SERPL-SCNC: 6.6 MMOL/L — CRITICAL HIGH (ref 3.5–5.3)
PROT SERPL-MCNC: 7.7 G/DL — SIGNIFICANT CHANGE UP (ref 6–8.3)
RBC # BLD: 4.28 M/UL — SIGNIFICANT CHANGE UP (ref 4.2–5.8)
RBC # FLD: 12.3 % — SIGNIFICANT CHANGE UP (ref 10.3–14.5)
SODIUM SERPL-SCNC: 138 MMOL/L — SIGNIFICANT CHANGE UP (ref 135–145)
TROPONIN I, HIGH SENSITIVITY RESULT: 4 NG/L — SIGNIFICANT CHANGE UP
TROPONIN I, HIGH SENSITIVITY RESULT: 4.2 NG/L — SIGNIFICANT CHANGE UP
WBC # BLD: 5.85 K/UL — SIGNIFICANT CHANGE UP (ref 3.8–10.5)
WBC # FLD AUTO: 5.85 K/UL — SIGNIFICANT CHANGE UP (ref 3.8–10.5)

## 2022-08-02 PROCEDURE — 71275 CT ANGIOGRAPHY CHEST: CPT | Mod: 26,ME

## 2022-08-02 PROCEDURE — 99285 EMERGENCY DEPT VISIT HI MDM: CPT

## 2022-08-02 PROCEDURE — G1004: CPT

## 2022-08-02 PROCEDURE — 71045 X-RAY EXAM CHEST 1 VIEW: CPT | Mod: 26

## 2022-08-02 PROCEDURE — 99285 EMERGENCY DEPT VISIT HI MDM: CPT | Mod: 25

## 2022-08-02 PROCEDURE — 83735 ASSAY OF MAGNESIUM: CPT

## 2022-08-02 PROCEDURE — 94640 AIRWAY INHALATION TREATMENT: CPT

## 2022-08-02 PROCEDURE — 85379 FIBRIN DEGRADATION QUANT: CPT

## 2022-08-02 PROCEDURE — 93970 EXTREMITY STUDY: CPT | Mod: 26

## 2022-08-02 PROCEDURE — 82962 GLUCOSE BLOOD TEST: CPT

## 2022-08-02 PROCEDURE — 96366 THER/PROPH/DIAG IV INF ADDON: CPT

## 2022-08-02 PROCEDURE — 84132 ASSAY OF SERUM POTASSIUM: CPT

## 2022-08-02 PROCEDURE — 36415 COLL VENOUS BLD VENIPUNCTURE: CPT

## 2022-08-02 PROCEDURE — 93970 EXTREMITY STUDY: CPT

## 2022-08-02 PROCEDURE — 71045 X-RAY EXAM CHEST 1 VIEW: CPT

## 2022-08-02 PROCEDURE — 84484 ASSAY OF TROPONIN QUANT: CPT

## 2022-08-02 PROCEDURE — 93005 ELECTROCARDIOGRAM TRACING: CPT

## 2022-08-02 PROCEDURE — 96375 TX/PRO/DX INJ NEW DRUG ADDON: CPT | Mod: XU

## 2022-08-02 PROCEDURE — 71275 CT ANGIOGRAPHY CHEST: CPT | Mod: ME

## 2022-08-02 PROCEDURE — 80053 COMPREHEN METABOLIC PANEL: CPT

## 2022-08-02 PROCEDURE — 93010 ELECTROCARDIOGRAM REPORT: CPT

## 2022-08-02 PROCEDURE — 85025 COMPLETE CBC W/AUTO DIFF WBC: CPT

## 2022-08-02 PROCEDURE — 96365 THER/PROPH/DIAG IV INF INIT: CPT | Mod: XU

## 2022-08-02 RX ORDER — ALBUTEROL 90 UG/1
2.5 AEROSOL, METERED ORAL ONCE
Refills: 0 | Status: COMPLETED | OUTPATIENT
Start: 2022-08-02 | End: 2022-08-02

## 2022-08-02 RX ORDER — SODIUM BICARBONATE 1 MEQ/ML
50 SYRINGE (ML) INTRAVENOUS ONCE
Refills: 0 | Status: COMPLETED | OUTPATIENT
Start: 2022-08-02 | End: 2022-08-02

## 2022-08-02 RX ORDER — VERAPAMIL HCL 240 MG
1 CAPSULE, EXTENDED RELEASE PELLETS 24 HR ORAL
Qty: 0 | Refills: 0 | DISCHARGE

## 2022-08-02 RX ORDER — FUROSEMIDE 40 MG
40 TABLET ORAL ONCE
Refills: 0 | Status: COMPLETED | OUTPATIENT
Start: 2022-08-02 | End: 2022-08-02

## 2022-08-02 RX ORDER — CETIRIZINE HYDROCHLORIDE 10 MG/1
1 TABLET ORAL
Qty: 0 | Refills: 0 | DISCHARGE

## 2022-08-02 RX ORDER — CALCIUM GLUCONATE 100 MG/ML
2 VIAL (ML) INTRAVENOUS ONCE
Refills: 0 | Status: DISCONTINUED | OUTPATIENT
Start: 2022-08-02 | End: 2022-08-02

## 2022-08-02 RX ORDER — INSULIN HUMAN 100 [IU]/ML
10 INJECTION, SOLUTION SUBCUTANEOUS ONCE
Refills: 0 | Status: COMPLETED | OUTPATIENT
Start: 2022-08-02 | End: 2022-08-02

## 2022-08-02 RX ORDER — CALCIUM GLUCONATE 100 MG/ML
1 VIAL (ML) INTRAVENOUS ONCE
Refills: 0 | Status: COMPLETED | OUTPATIENT
Start: 2022-08-02 | End: 2022-08-02

## 2022-08-02 RX ORDER — AMLODIPINE BESYLATE 2.5 MG/1
1 TABLET ORAL
Qty: 0 | Refills: 0 | DISCHARGE

## 2022-08-02 RX ORDER — FUROSEMIDE 40 MG
1 TABLET ORAL
Qty: 0 | Refills: 0 | DISCHARGE

## 2022-08-02 RX ORDER — DIAZEPAM 5 MG
5 TABLET ORAL ONCE
Refills: 0 | Status: DISCONTINUED | OUTPATIENT
Start: 2022-08-02 | End: 2022-08-02

## 2022-08-02 RX ORDER — DEXTROSE 50 % IN WATER 50 %
50 SYRINGE (ML) INTRAVENOUS ONCE
Refills: 0 | Status: COMPLETED | OUTPATIENT
Start: 2022-08-02 | End: 2022-08-02

## 2022-08-02 RX ADMIN — Medication 100 GRAM(S): at 05:21

## 2022-08-02 RX ADMIN — Medication 100 GRAM(S): at 07:24

## 2022-08-02 RX ADMIN — Medication 40 MILLIGRAM(S): at 01:59

## 2022-08-02 RX ADMIN — ALBUTEROL 2.5 MILLIGRAM(S): 90 AEROSOL, METERED ORAL at 05:23

## 2022-08-02 RX ADMIN — INSULIN HUMAN 10 UNIT(S): 100 INJECTION, SOLUTION SUBCUTANEOUS at 04:57

## 2022-08-02 RX ADMIN — Medication 50 MILLILITER(S): at 04:56

## 2022-08-02 RX ADMIN — Medication 5 MILLIGRAM(S): at 04:45

## 2022-08-02 RX ADMIN — Medication 1 GRAM(S): at 06:52

## 2022-08-02 RX ADMIN — Medication 50 MILLIEQUIVALENT(S): at 04:57

## 2022-08-02 NOTE — ED PROVIDER NOTE - OBJECTIVE STATEMENT
pt states that for 1 week has been having increasing sob on exertion and today has been having chest tightness and reports elevated BP at home, no h/o CAD, but on lasix for what sounds like chronic CHF in setting of low EF as per pt.  denies fever, cough.

## 2022-08-02 NOTE — ED PROVIDER NOTE - PROGRESS NOTE DETAILS
pt still c/o SOB despite oxygen saturation of 99% on RA, neg d-dimer, neg trop, sx possibly due to anxiety, will trial valium 5mg. d/w dr wilson cardiology, recommend obtain CTA Chest and Duplex LEs. pt with cp,sob, leg swelling and travel history. if negative can discharge patient and patient can restart his verapamil. pt resting comfortably on stretcher, NAD,, aware awaiting CTA chest and duplex LEs, pt st traveled to [facility] july 13 and has had a "knot" in his left leg.

## 2022-08-02 NOTE — CONSULT NOTE ADULT - ATTENDING COMMENTS
atypical cp, dyspnea and edema  recent plane flights,   needs pe eval  if neg would prob dc home for outpt eval of atypical cp, to include echo and stress

## 2022-08-02 NOTE — ED PROVIDER NOTE - NSFOLLOWUPINSTRUCTIONS_ED_ALL_ED_FT
Rest  Follow-up with your doctor this week  Return here if needed      [employer]® CareNotes®     :  [facility]  	                     CHEST PAIN - General Information           Dolor de pecho    LO QUE NECESITA SABER:    ¿Qué necesito saber acerca del dolor de pecho?El dolor en el pecho puede ser provocado por jazmyn variedad de condiciones, algunas no tan serias y otras que son de peligro mortal. Es importante que programe jazmyn liza con hernadez médico para determinar la causa de hernadez dolor de pecho.    ¿Qué provoca o aumenta mi riesgo de tener dolor de pecho?  •Un problema de digestión, bowen el reflujo ácido o jazmyn úlcera estomacal      •Un ataque de ansiedad o jazmyn emoción radha, bowen la katherin      •Jazmyn infección, inflamación o fractura en los huesos o un cartílago en el pecho      •Flujo de serenity deficiente hacia el corazón (angina)      •Jazmyn condición potencialmente mortal, bowen un ataque al corazón o un coágulo de serenity en los pulmones      ¿Cuáles otros síntomas podrían presentarse con el dolor en el pecho?  •Jazmyn sensación de ardor detrás del esternón      •Ritmo cardíaco acelerado o lento      •Fiebre o sudoración      •Náuseas o vómitos      •Falta de aliento      •Molestia o presión que se propaga del pecho a la espalda, mandíbula o brazo      •Sensación de debilidad, cansancio o desmayo      ¿Cómo se diagnostica la causa del dolor en el pecho?Hernadez médico lo examinará. Describa hernadez dolor en el pecho con el arcenio detalle que sea posible. Dígale dónde le duele y cuándo empezó el dolor. Coméntele si usted nota algo que hace empeorar o mejorar el dolor. Además infórmele si el dolor es ignacio o intermitente. Incluya también cualquier otro síntoma que tenga junto con el dolor en el pecho, bowen sudoración o náuseas. Hernadez médico le preguntará cuáles son los medicamentos que manny y acerca de cualquier condición médica que tenga. Dígale si tiene antecedentes familiares enfermedad cardíaca. Es posible que también deba hacerse alguno de los siguientes exámenes:  •Un electrocardiogramaes un examen que registra la actividad eléctrica de hernadez corazón.      •Los análisis de sangrerevisan si hay daños en el corazón y signos de ataque cardíaco.      •Un ecocardiogramausa ondas de benita para mejia si la serenity fluye normalmente a través del corazón.      •Un ultrasonido, jazmyn radiografía, jazmyn tomografía computarizada o jazmyn imagen por resonancia magnética (IRM)podría mostrar la causa de hernadez dolor de pecho. Es posible que le administren líquido de contraste para que hernadez corazón se rocky mejor en las imágenes. Dígale al médico si usted alguna vez ha tenido jazmyn reacción alérgica al líquido de contraste. No entre a la karan donde se realiza la resonancia magnética con algo de metal. El metal puede causar lesiones serias. Informe a hernadez médico si usted tiene algún metal dentro o sobre hernadez cuerpo.      •Jazmyn endoscopiapuede hacerse para revisar si tiene úlceras o problemas con el esófago.  Endoscopia superior           ¿Cómo se trata el dolor en el pecho?  •Los medicamentospueden administrarse para tratar la causa del dolor de pecho. Por ejemplo, analgésicos, medicamentos para la ansiedad o medicamentos para aumentar el flujo de serenity al corazón. No tome ciertos medicamentos sin antes preguntarle a hernadez médico. Estos incluyen CARLOS, suplementos vitamínicos y hormonas, bowen el estrógeno o la progestina.      •Un stentpodría colocarse si el dolor de pecho es causado por jzamyn obstrucción en el corazón. Un stent es un pequeño tubo elaborado en marina de alambre que ayuda a mantener abierta la arteria. Usted podría necesitar más de 1 stent.  Angioplastia de la arteria coronaria (stent)           ¿Cuáles son algunos consejos para vivir jazmyn ramy amira?Si se conoce la causa de hernadez dolor de pecho, hernadez médico le dará pautas específicas a seguir. Los siguientes son consejos generales de perry:  •No fume.La nicotina y otros químicos contenidos en los cigarrillos y cigarros pueden causar daño a rayne pulmones y el corazón. Pida información a hernadez médico si usted actualmente fuma y necesita ayuda para dejar de fumar. Los cigarrillos electrónicos o el tabaco sin humo igualmente contienen nicotina. Consulte con hernadez médico antes de utilizar estos productos.      •Elija jazmyn variedad de alimentos saludables tan a menudo bowen sea posible.Incluya frutas y verduras frescas, congeladas o enlatadas. También incluya productos lácteos bajos en grasa, pescado, neida (sin piel) y rimma magras. Hernadez médico o dietista pueden ayudarlo a crear planes de alimentos. Es posible que tenga que evitar ciertos alimentos o bebidas si el dolor es causado por un problema de digestión.  Alimentos saludables           •Reduzca el consumo de sodio (sal).Limite el consumo de alimentos altos en sodio, bowen comidas enlatadas, bocadillos salados y embutidos. Si añade venus cuando cocina la comida, no añada más en la lynn. Elija alimentos enlatados bajos en sodio tanto bowen sea posible.             •Consuma abundante agua al día.El agua ayuda al cuerpo a controlar la temperatura y la presión arterial. Pregunte a hernadez médico cuál es la cantidad de agua que debería consumir cada día.      •Pregunte acerca de la actividad.Hernadez médico le dirá cuáles actividades limitar y cuáles evitar. Pregunte cuándo puede manejar, regresar a hernadez trabajo y tener relaciones sexuales. Pida más información acerca de un plan de ejercicio adecuado para usted.      •Mantenga un peso saludable.Pregúntele a hernadez médico cuál es el peso ideal para usted. Pídale que lo ayude a crear un plan para bajar de peso si tiene sobrepeso.      •Pregunte sobre las vacunas que pudiera necesitar.Hernadez médico puede indicarle qué vacunas necesita y cuándo aplicárselas. Las siguientes vacunas ayudan a prevenir enfermedades que pueden llegar a ser graves para jazmyn persona con jazmyn afección cardíaca:?La vacuna contra la gripe se aplica todos los años.Vacúnese contra la gripe tan pronto bowen se recomiende, normalmente en septiembre u octubre.      ?La vacuna contra la neumonía generalmente se aplica cada 5 años.Hernadez médico puede recomendarle la vacuna contra la neumonía si tiene 65 años o más.      ?Las vacunas contra la COVID-19 se administran a los adultos en forma de inyección en 1 o 2 dosis.Se recomienda la vacunación para todos los adultos. También se recomienda jazmyn dosis de refuerzo (adicional) para todos los adultos. Se recomienda jazmyn segunda dosis refuerzo para todos los adultos de 50 años o más y para los adultos inmunodeprimidos de 18 años o más. La segunda dosis de refuerzo también se recomienda para los adultos que recibieron la vacuna de 1 dosis bowen primera dosis y de refuerzo. Hernadez médico puede decirle cuándo recibir jazmyn o ambas dosis de refuerzo.      Evite la enfermedad cardíaca         Llame al número local de emergencias (911 en los [place]) o pídale a alguien que llame si:  •Tiene alguno de los siguientes signos de un ataque cardíaco: ?Estrujamiento, presión o tensión en hernadez pecho      ?Usted también podría presentar alguno de los siguientes: ?Malestar o dolor en hernadez espalda, yovany, mandíbula, abdomen, o brazo      ?Falta de aliento      ?Náuseas o vómitos      ?Desvanecimiento o sudor frío repentino            ¿Cuándo masoud buscar atención inmediata?  •La inflamación en hernadez pecho empeora, aun con tratamiento.      •Usted tose o vomita serenity.      •Rayne heces son negras o tienen serenity.      •Usted no puede dejar de vomitar o le duele al tragar.      ¿Cuándo masoud llamar a mi médico?  •Usted tiene preguntas o inquietudes acerca de hernadez condición o cuidado.          ACUERDOS SOBRE HERNADEZ CUIDADO:    Usted tiene el derecho de ayudar a planear hernadez cuidado. Aprenda todo lo que pueda sobre hernadez condición y bowen darle tratamiento. Discuta rayne opciones de tratamiento con rayne médicos para decidir el cuidado que usted desea recibir. Usted siempre tiene el derecho de rechazar el tratamiento.       © Copyright "[employer]" 2022           back to top                          © Copyright "[employer]" 2022

## 2022-08-02 NOTE — ED PROVIDER NOTE - PHYSICAL EXAMINATION
Gen: alert, NAD  HEENT:  NC/AT, PERR, no exophthalmos  CV:  rrr, well perfused, rrr, mild LE pitting edema  Pulm:  cta b/l, normal RR, I/E ratio and chest excursion  Abd: s/nt/nd  MSK: moving all extremities  Neuro:  non-focal  Skin:  visualized areas intact  Psych: AOx3

## 2022-08-02 NOTE — CONSULT NOTE ADULT - SUBJECTIVE AND OBJECTIVE BOX
[facility]         Ray Cox, Reginaldo, Lali, Ros, Benedict Veloz        [phone] (office)    Reason for Consult: chest pain , sob       HPI: Simón Cordon is a 63 y/o male with a past medhx of chronic CHF with preserved EF type 1 diastolic dysfunction, HTN and hyperlipemia presenting with 1 week of mild SOB on exertion, leg swelling and chest tightness that began last night. Pt traveled to [facility] 2 weeks ago for SOB and leg swelling and the p[hysi       PAST MEDICAL & SURGICAL HISTORY:  Hypertension      Helicobacter pylori gastritis      H/O hernia repair          SOCIAL HISTORY: No active tobacco, alcohol or illicit drug use    FAMILY HISTORY:      Home Medications:  enalapril 20 mg oral tablet: orally 2 times a day (02 Aug 2022 01:23)  furosemide 20 mg oral tablet: 1 tab(s) orally once a day (02 Aug 2022 01:23)  verapamil 120 mg/24 hours oral capsule, extended release: 1 cap(s) orally 2 times a day (02 Aug 2022 01:23)  ZyrTEC 10 mg oral tablet: 1 tab(s) orally once a day (02 Aug 2022 01:23)      MEDICATIONS  (STANDING):    MEDICATIONS  (PRN):      Allergies    No Known Allergies    Intolerances        REVIEW OF SYSTEMS: Negative except as per HPI.    VITAL SIGNS:   Vital Signs Last 24 Hrs  T(C): 36.6 (02 Aug 2022 07:55), Max: 36.7 (02 Aug 2022 01:14)  T(F): 97.8 (02 Aug 2022 07:55), Max: 98.1 (02 Aug 2022 01:14)  HR: 62 (02 Aug 2022 07:55) (60 - 81)  BP: 116/67 (02 Aug 2022 07:55) (116/67 - 159/90)  BP(mean): --  RR: 17 (02 Aug 2022 07:55) (16 - 18)  SpO2: 98% (02 Aug 2022 04:47) (98% - 99%)    Parameters below as of 02 Aug 2022 07:55  Patient On (Oxygen Delivery Method): room air        I&O's Summary      PHYSICAL EXAM:  Constitutional: NAD, well-developed  HEENT NC/AT, moist mucous membranes  Pulmonary: Non-labored, breath sounds are clear bilaterally, no wheezing, rales or rhonchi  Cardiovascular: +S1, S2, RRR, no murmur  Gastrointestinal: Soft, nontender, nondistended, normoactive bowel sounds  Extremities: No peripheral edema   Neurological: Alert, strength and sensitivity are grossly intact  Skin: No obvious lesions/rashes  Psych: Mood & affect appropriate    LABS: All Labs Reviewed:                        14.2   5.85  )-----------( 244      ( 02 Aug 2022 02:05 )             40.5     02 Aug 2022 03:06    138    |  110    |  16     ----------------------------<  113    6.6     |  22     |  0.93     Ca    9.0        02 Aug 2022 03:06  Mg     2.2       02 Aug 2022 03:06    TPro  7.7    /  Alb  4.0    /  TBili  0.5    /  DBili  x      /  AST  74     /  ALT  39     /  AlkPhos  68     02 Aug 2022 03:06          Blood Culture:         EKG:    RADIOLOGY:    CXR:   [facility]         Ray Cox, Lali Hair, Ros, Benedict Veloz        [phone] (office)    Reason for Consult: chest pain , sob       HPI: Simón Cordon is a 61 y/o male with a hx of HTN presenting with mild SOB on exertion and right LE edema/pain that began one week ago following airplane travel. Pt returned from [facility] on July 24th and states that since his return he has noticed persistent right leg swelling/pain along with mild shortness of breath on exertion that worsened last night which brought him to the ED. Pt also states he has been experiencing a brief intermittent "pinching" pain on the left side of his chest that lasts for a few seconds and resolves spontaneously. The pain does not radiate and the pt can not identify what makes it better or worse. While in [facility] the pt saw a physician for similar sxs and was started on 20 mg of lasix for the swelling which he has been taking since his return with minimal relief. The physician also discontinued the pt's verapamil because the pt told him he does not feel well after taking it. Pt's cardiologist is Dr. Sommer and he had a routine follow-up visit scheduled for this Friday to repeat stress testing and an echocardiogram which has not been performed since August of 2020. Previous test results indicate the pt has mild diastolic dysfunction consistent with age, but there is no evidence of HF.  Pt states the LE swelling is currently better today than it has been for the last week. Pt is a non-smoker and denies any recreational drug use        PAST MEDICAL & SURGICAL HISTORY:  Hypertension      Helicobacter pylori gastritis      H/O hernia repair          SOCIAL HISTORY: No active tobacco, alcohol or illicit drug use    FAMILY HISTORY: Non-contributory       Home Medications:  enalapril 20 mg oral tablet: orally 2 times a day (02 Aug 2022 01:23)  furosemide 20 mg oral tablet: 1 tab(s) orally once a day (02 Aug 2022 01:23)  verapamil 120 mg/24 hours oral capsule, extended release: 1 cap(s) orally 2 times a day (02 Aug 2022 01:23)  ZyrTEC 10 mg oral tablet: 1 tab(s) orally once a day (02 Aug 2022 01:23)      MEDICATIONS  (STANDING):    MEDICATIONS  (PRN):      Allergies    No Known Allergies    Intolerances        REVIEW OF SYSTEMS: Negative except as per HPI.    VITAL SIGNS:   Vital Signs Last 24 Hrs  T(C): 36.6 (02 Aug 2022 07:55), Max: 36.7 (02 Aug 2022 01:14)  T(F): 97.8 (02 Aug 2022 07:55), Max: 98.1 (02 Aug 2022 01:14)  HR: 62 (02 Aug 2022 07:55) (60 - 81)  BP: 116/67 (02 Aug 2022 07:55) (116/67 - 159/90)  BP(mean): --  RR: 17 (02 Aug 2022 07:55) (16 - 18)  SpO2: 98% (02 Aug 2022 04:47) (98% - 99%)    Parameters below as of 02 Aug 2022 07:55  Patient On (Oxygen Delivery Method): room air        I&O's Summary      PHYSICAL EXAM:  Constitutional: NAD, well-developed  HEENT NC/AT, moist mucous membranes  Pulmonary: Non-labored, breath sounds are clear bilaterally, no wheezing, rales or rhonchi  Cardiovascular: +S1, S2, RRR, no murmur  Gastrointestinal: Soft, nontender, nondistended, normoactive bowel sounds  Extremities: 1+ pitting edema in right LE   Neurological: Alert, strength and sensitivity are grossly intact  Skin: No obvious lesions/rashes  Psych: Mood & affect appropriate    LABS: All Labs Reviewed:                        14.2   5.85  )-----------( 244      ( 02 Aug 2022 02:05 )             40.5     02 Aug 2022 03:06    138    |  110    |  16     ----------------------------<  113    6.6     |  22     |  0.93     Ca    9.0        02 Aug 2022 03:06  Mg     2.2       02 Aug 2022 03:06    TPro  7.7    /  Alb  4.0    /  TBili  0.5    /  DBili  x      /  AST  74     /  ALT  39     /  AlkPhos  68     02 Aug 2022 03:06          Blood Culture:         EKG: Normal sinus rhythm at 64 BPM     RADIOLOGY: CTA chest ordered, Doppler right LE ordered     < from: CT Angio Chest PE Protocol w/ IV Cont (08.02.22 @ 11:40) >  IMPRESSION: No evidence of pulmonary embolism.    --- End of Report ---            SIA DIETZ MD; Attending Radiologist  This document has been electronically signed. Aug  2 2022 11:54AM    < end of copied text >  < from: US Duplex Venous Lower Ext Complete, Bilateral (08.02.22 @ 12:15) >  IMPRESSION:  No evidence of deep venous thrombosis in either lower extremity.          --- End of Report ---            CLEM YIN MD; Attending Radiologist  This document has been electronically signed. Aug  2 2022 12:29PM    < end of copied text >   [facility]         Ray Cox, Lali Hair, Ros, Benedict Veloz        [phone] (office)    Reason for Consult: chest pain , sob       HPI: Simón Cordon is a 63 y/o male with a hx of HTN presenting with mild SOB on exertion and right LE edema/pain that began one week ago following airplane travel. Pt returned from [facility] on July 24th and states that since his return he has noticed persistent right leg swelling/pain along with mild shortness of breath on exertion. The SOB increased last night, which brought the pt to the ED. Pt also states he has been experiencing a brief intermittent "pinching" pain on the left side of his chest that lasts for a few seconds and resolves spontaneously. The pain does not radiate and the pt can not identify what makes it better or worse. While in [facility] the pt saw a physician for similar sxs and was started on 20 mg of lasix for the edema which he has been taking since his return with minimal relief. The physician also discontinued the pt's verapamil because the pt told him he does not feel well after taking it. Pt's cardiologist is Dr. Sommer and he has a routine follow-up visit scheduled for this Friday to repeat stress testing and an echocardiogram which has not been performed since August of 2020. Previous test results indicate the pt has mild diastolic dysfunction consistent with age, but there is no evidence of HF.  Pt states the LE swelling is currently better today than it has been for the last week. Pt is a non-smoker and denies any recreational drug use        PAST MEDICAL & SURGICAL HISTORY:  Hypertension      Helicobacter pylori gastritis      H/O hernia repair          SOCIAL HISTORY: No active tobacco, alcohol or illicit drug use    FAMILY HISTORY: Non-contributory       Home Medications:  enalapril 20 mg oral tablet: orally 2 times a day (02 Aug 2022 01:23)  furosemide 20 mg oral tablet: 1 tab(s) orally once a day (02 Aug 2022 01:23)  verapamil 120 mg/24 hours oral capsule, extended release: 1 cap(s) orally 2 times a day (02 Aug 2022 01:23)  ZyrTEC 10 mg oral tablet: 1 tab(s) orally once a day (02 Aug 2022 01:23)      MEDICATIONS  (STANDING):    MEDICATIONS  (PRN):      Allergies    No Known Allergies    Intolerances        REVIEW OF SYSTEMS: Negative except as per HPI.    VITAL SIGNS:   Vital Signs Last 24 Hrs  T(C): 36.6 (02 Aug 2022 07:55), Max: 36.7 (02 Aug 2022 01:14)  T(F): 97.8 (02 Aug 2022 07:55), Max: 98.1 (02 Aug 2022 01:14)  HR: 62 (02 Aug 2022 07:55) (60 - 81)  BP: 116/67 (02 Aug 2022 07:55) (116/67 - 159/90)  BP(mean): --  RR: 17 (02 Aug 2022 07:55) (16 - 18)  SpO2: 98% (02 Aug 2022 04:47) (98% - 99%)    Parameters below as of 02 Aug 2022 07:55  Patient On (Oxygen Delivery Method): room air        I&O's Summary      PHYSICAL EXAM:  Constitutional: NAD, well-developed  HEENT NC/AT, moist mucous membranes  Pulmonary: Non-labored, breath sounds are clear bilaterally, no wheezing, rales or rhonchi  Cardiovascular: +S1, S2, RRR, no murmur  Gastrointestinal: Soft, nontender, nondistended, normoactive bowel sounds  Extremities: 1+ pitting edema in right LE   Neurological: Alert, strength and sensitivity are grossly intact  Skin: No obvious lesions/rashes  Psych: Mood & affect appropriate    LABS: All Labs Reviewed:                        14.2   5.85  )-----------( 244      ( 02 Aug 2022 02:05 )             40.5     02 Aug 2022 03:06    138    |  110    |  16     ----------------------------<  113    6.6     |  22     |  0.93     Ca    9.0        02 Aug 2022 03:06  Mg     2.2       02 Aug 2022 03:06    TPro  7.7    /  Alb  4.0    /  TBili  0.5    /  DBili  x      /  AST  74     /  ALT  39     /  AlkPhos  68     02 Aug 2022 03:06          Blood Culture:         EKG: Normal sinus rhythm at 64 BPM     RADIOLOGY: CTA chest ordered, Doppler right LE ordered     < from: CT Angio Chest PE Protocol w/ IV Cont (08.02.22 @ 11:40) >  IMPRESSION: No evidence of pulmonary embolism.    --- End of Report ---            SIA DIETZ MD; Attending Radiologist  This document has been electronically signed. Aug  2 2022 11:54AM    < end of copied text >  < from: US Duplex Venous Lower Ext Complete, Bilateral (08.02.22 @ 12:15) >  IMPRESSION:  No evidence of deep venous thrombosis in either lower extremity.          --- End of Report ---            CLEM YIN MD; Attending Radiologist  This document has been electronically signed. Aug  2 2022 12:29PM    < end of copied text >

## 2022-08-02 NOTE — ED PROVIDER NOTE - CARE PROVIDER_API CALL
Jere Escalera)  Cardiovascular Disease  [address] [place] [address]  Phone: [phone]  Fax: [phone]  Follow Up Time:

## 2022-08-02 NOTE — ED PROVIDER NOTE - PATIENT PORTAL LINK FT
You can access the FollowMyHealth Patient Portal offered by [facility] by registering at the following website: [email or web address]. By joining [employer]’s FollowMyHealth portal, you will also be able to view your health information using other applications (apps) compatible with our system.

## 2022-08-05 ENCOUNTER — NON-APPOINTMENT (OUTPATIENT)
Age: 62
End: 2022-08-05

## 2022-08-05 ENCOUNTER — APPOINTMENT (OUTPATIENT)
Dept: CARDIOLOGY | Facility: CLINIC | Age: 62
End: 2022-08-05

## 2022-08-05 VITALS
DIASTOLIC BLOOD PRESSURE: 73 MMHG | OXYGEN SATURATION: 97 % | BODY MASS INDEX: 32.32 KG/M2 | WEIGHT: 194 LBS | SYSTOLIC BLOOD PRESSURE: 122 MMHG | HEIGHT: 65 IN | HEART RATE: 58 BPM

## 2022-08-05 PROCEDURE — 93000 ELECTROCARDIOGRAM COMPLETE: CPT

## 2022-08-05 PROCEDURE — 99214 OFFICE O/P EST MOD 30 MIN: CPT | Mod: 25

## 2022-08-05 RX ORDER — VERAPAMIL HYDROCHLORIDE 120 MG/1
120 TABLET ORAL TWICE DAILY
Qty: 60 | Refills: 3 | Status: ACTIVE | COMMUNITY
Start: 2021-04-09 | End: 1900-01-01

## 2022-08-05 NOTE — DISCUSSION/SUMMARY
[FreeTextEntry1] : This is a 62 year old man with a history of hypertension, hyperlipidemia, obesity, and kidney stones who presents to the office for a follow up visit.\par He is in no acute distress.  He is euvolemic on exam.  ECG illustrates sinus rebeca at 58 bpm, unchanged from previous.  blood pressure is controlled today.\par Symptoms have resolved.  Increased leg swelling experienced while in [place] may have been related to salty diet that he admits to.  Given LE edema has resolved, I advised he keep lasix 20mg on an as needed basis. Will also check an ECHO to evaluate structura and function.\par \par His bp has been well controlled so will continue enalapril 20 bid.  He will continue verapamil 120 bid.  I gave him the liberty to take an extra verapamil as needed for his blood pressure spikes. He was counseled on his salt consumption.  We discussed the benefits of a healthy diet, regular exercise and physical activity, and weight loss in detail.  He will follow in 3 months.

## 2022-08-05 NOTE — CARDIOLOGY SUMMARY
[___] : [unfilled] [No Ischemia] : no Ischemia [None] : normal LV function [de-identified] :  NSR\par \par HR 58 [de-identified] : 2020\par EF 62%

## 2022-08-05 NOTE — PHYSICAL EXAM
[Normal Appearance] : normal appearance [Well Groomed] : well groomed [General Appearance - In No Acute Distress] : no acute distress [Normal Oral Mucosa] : normal oral mucosa [Normal Jugular Venous V Waves Present] : normal jugular venous V waves present [Respiration, Rhythm And Depth] : normal respiratory rhythm and effort [Exaggerated Use Of Accessory Muscles For Inspiration] : no accessory muscle use [Bowel Sounds] : normal bowel sounds [Auscultation Breath Sounds / Voice Sounds] : lungs were clear to auscultation bilaterally [Abdomen Soft] : soft [Abdomen Tenderness] : non-tender [Abnormal Walk] : normal gait [Gait - Sufficient For Exercise Testing] : the gait was sufficient for exercise testing [Nail Clubbing] : no clubbing of the fingernails [Cyanosis, Localized] : no localized cyanosis [Skin Color & Pigmentation] : normal skin color and pigmentation [] : no rash [Oriented To Time, Place, And Person] : oriented to person, place, and time [No Anxiety] : not feeling anxious [Normal] : normal [No Precordial Heave] : no precordial heave was noted [Bradycardia] : bradycardic [2+] : left 2+ [Well Developed] : well developed [Well Nourished] : well nourished [No Acute Distress] : no acute distress [Normal Conjunctiva] : normal conjunctiva [Normal Venous Pressure] : normal venous pressure [No Carotid Bruit] : no carotid bruit [Normal S1, S2] : normal S1, S2 [No Rub] : no rub [No Gallop] : no gallop [Rhythm Regular] : regular [Normal S1] : normal S1 [Normal S2] : normal S2 [No Murmur] : no murmurs heard [No Pitting Edema] : no pitting edema present [No Abnormalities] : the abdominal aorta was not enlarged and no bruit was heard [Clear Lung Fields] : clear lung fields [Good Air Entry] : good air entry [No Respiratory Distress] : no respiratory distress  [Soft] : abdomen soft [Non Tender] : non-tender [No Masses/organomegaly] : no masses/organomegaly [Normal Bowel Sounds] : normal bowel sounds [Normal Gait] : normal gait [No Edema] : no edema [No Cyanosis] : no cyanosis [No Clubbing] : no clubbing [No Varicosities] : no varicosities [No Rash] : no rash [No Skin Lesions] : no skin lesions [Moves all extremities] : moves all extremities [No Focal Deficits] : no focal deficits [Normal Speech] : normal speech [Alert and Oriented] : alert and oriented [Normal memory] : normal memory [FreeTextEntry1] : No JVD [Right Carotid Bruit] : no bruit heard over the right carotid [Left Carotid Bruit] : no bruit heard over the left carotid

## 2022-08-05 NOTE — HISTORY OF PRESENT ILLNESS
[FreeTextEntry1] : This is a 62 year old man with a history of hypertension, hyperlipidemia, obesity, and kidney stones who presents to the office for a follow up visit.  His blood pressure has been well controlled as of late. His BP is controlled today.    He denies anginal chest pain, dyspnea, PND, orthopnea, LE swelling, dizziness, or syncope.  He has not had any recent episodes of accelerated hypertension. \par \par Mr Cordon presents today 8/5/22 for hospital follow up.\par He presented to the ED at [place] on 8/2/22 with complaint of SOB, LE swelling and elevated blood pressure.  Blood pressure at presentation to the ED was 159/90, blood work revealed potassium of 6.6 likely hemolyzed, repeat of 4.2.  All other workup un revealing. \par He unfortunately developed hand swelling due to IV infiltrate during his ED admission \par He had just returned back from [facility] on 7/26/22, he reports have some issues with leg swelling while in [facility], for which he was put on Lasix 20mg by a general medicine physician.\par \par Since the ED admission, he has been feeling well. Denies any further shortness of breath or leg swelling.\par He denies chest pains or pressure. He  denies dizzy spells, feeling faint or fainting, He   denies palpitations or difficulty breathing while laying flat. He denies lower extremity swelling currently.  \par

## 2022-08-10 NOTE — ED ADULT NURSE NOTE - NS ED NURSE LEVEL OF CONSCIOUSNESS ORIENTATION
Additional Notes: Recommended 30min appt for I&D for the two cysts right next to each other. Discussed procedure in detail. Pt will schedule prn.
Render Risk Assessment In Note?: no
Detail Level: Detailed
Oriented - self; Oriented - place; Oriented - time

## 2022-08-19 ENCOUNTER — APPOINTMENT (OUTPATIENT)
Dept: CARDIOLOGY | Facility: CLINIC | Age: 62
End: 2022-08-19

## 2022-08-19 PROCEDURE — 93306 TTE W/DOPPLER COMPLETE: CPT

## 2022-09-26 ENCOUNTER — APPOINTMENT (OUTPATIENT)
Dept: CARDIOLOGY | Facility: CLINIC | Age: 62
End: 2022-09-26

## 2022-09-29 NOTE — PHYSICAL THERAPY INITIAL EVALUATION ADULT - PATIENT/FAMILY/SIGNIFICANT OTHER GOALS STATEMENT, PT EVAL
[FreeTextEntry1] : Longstanding reflux cannot rule out Nobles's\par History of colon polyps due for surveillance\par \par Plan\par Indications risks benefits and alternatives to both upper endoscopy and colonoscopy reviewed with patient\par He is agreeable to examination To feel better

## 2022-10-04 NOTE — CONSULT NOTE ADULT - REASON FOR ADMISSION
53M with no significant PMHx but has 42 pack year smoking history (1 PPD since age 11), admitted to [facility] with CP/SOB/NSTEMI, emergent cath with MVD s/p IABP placement on 5/3 for support and transferred to [facility]. 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 Failed ECMO wean on 5/12 - IABP removed and Impella 5.5 placed for additional support.    #tracheal secretions-  Patient is capped, doing well  Only suctioned once since 7 am  CXR appears to show clear lungs  Would cont Duoneb q8  Tial of hypersal BID and Pulmicort BID  Encourage ambulation and OOB to chair  Completed course of abx for a PNA chest pain with sob

## 2022-10-08 NOTE — ED PROVIDER NOTE - CPE EDP SKIN NORM
Goal Outcome Evaluation:   No new changes this shift.  Wound care preformed per orders, not tolerated well.  Premedicated per MAR and medicated after treatment.               normal...

## 2022-10-19 NOTE — ED ADULT NURSE NOTE - BREATHING
Final Anesthesia Post-op Assessment    Patient: Mariza Limon  Procedure(s) Performed: COLONOSCOPY  Anesthesia type: General    Vitals Value Taken Time   Temp 36.8 10/19/22 1511   Pulse 55 10/18/22 1221   Resp 16 10/18/22 1221   SpO2 100 % 10/18/22 1221   /67 10/18/22 1221         Patient Location: PACU Phase 1  Post-op Vital Signs:stable  Level of Consciousness: awake and alert  Respiratory Status: spontaneous ventilation  Cardiovascular stable  Hydration: euvolemic  Pain Management: adequately controlled  Handoff: Handoff to receiving clinician was performed and questions were answered  Vomiting: none  Nausea: None  Airway Patency:patent  Post-op Assessment: no complications and patient tolerated procedure well with no complications      No complications documented.   
spontaneous/unlabored

## 2022-12-30 ENCOUNTER — EMERGENCY (EMERGENCY)
Facility: HOSPITAL | Age: 62
LOS: 1 days | Discharge: LEFT BEFORE DISCHARGE | End: 2022-12-30
Admitting: EMERGENCY MEDICINE
Payer: COMMERCIAL

## 2022-12-30 VITALS
TEMPERATURE: 98 F | DIASTOLIC BLOOD PRESSURE: 80 MMHG | RESPIRATION RATE: 18 BRPM | SYSTOLIC BLOOD PRESSURE: 155 MMHG | OXYGEN SATURATION: 99 % | HEART RATE: 65 BPM | WEIGHT: 199.96 LBS | HEIGHT: 68 IN

## 2022-12-30 DIAGNOSIS — Z98.890 OTHER SPECIFIED POSTPROCEDURAL STATES: Chronic | ICD-10-CM

## 2022-12-30 PROCEDURE — L9991: CPT

## 2022-12-30 NOTE — ED ADULT TRIAGE NOTE - CHIEF COMPLAINT QUOTE
Received pt c/o mid abd pain beginning today.  Pain worse with palpation.   Pt reports one episode of vomiting after eating potato chips today.   Denies known sick contact/fevers.   Abd soft, pt reports normal BM today.  Denies cp/sob/palpitations.  Skin warm and dry pt well appearing. BN

## 2023-01-17 ENCOUNTER — APPOINTMENT (OUTPATIENT)
Dept: CARDIOLOGY | Facility: CLINIC | Age: 63
End: 2023-01-17

## 2023-03-22 ENCOUNTER — NON-APPOINTMENT (OUTPATIENT)
Age: 63
End: 2023-03-22

## 2023-03-22 ENCOUNTER — APPOINTMENT (OUTPATIENT)
Dept: CARDIOLOGY | Facility: CLINIC | Age: 63
End: 2023-03-22
Payer: COMMERCIAL

## 2023-03-22 VITALS
SYSTOLIC BLOOD PRESSURE: 112 MMHG | OXYGEN SATURATION: 96 % | HEART RATE: 67 BPM | BODY MASS INDEX: 31.99 KG/M2 | HEIGHT: 65 IN | DIASTOLIC BLOOD PRESSURE: 72 MMHG | WEIGHT: 192 LBS

## 2023-03-22 DIAGNOSIS — I10 ESSENTIAL (PRIMARY) HYPERTENSION: ICD-10-CM

## 2023-03-22 DIAGNOSIS — R07.9 CHEST PAIN, UNSPECIFIED: ICD-10-CM

## 2023-03-22 PROCEDURE — 99214 OFFICE O/P EST MOD 30 MIN: CPT | Mod: 25

## 2023-03-22 PROCEDURE — 93000 ELECTROCARDIOGRAM COMPLETE: CPT

## 2023-03-27 ENCOUNTER — APPOINTMENT (OUTPATIENT)
Dept: CARDIOLOGY | Facility: CLINIC | Age: 63
End: 2023-03-27
Payer: COMMERCIAL

## 2023-03-27 PROCEDURE — 93015 CV STRESS TEST SUPVJ I&R: CPT

## 2023-08-01 NOTE — ED ADULT NURSE NOTE - PMH
No pertinent past medical history <<----- Click to add NO pertinent Past Medical History tray rodriguez(ordering); dr. flynn(attending)

## 2023-08-07 ENCOUNTER — RX RENEWAL (OUTPATIENT)
Age: 63
End: 2023-08-07

## 2023-08-07 RX ORDER — VERAPAMIL HYDROCHLORIDE 120 MG/1
120 TABLET ORAL TWICE DAILY
Qty: 60 | Refills: 11 | Status: ACTIVE | COMMUNITY
Start: 2022-08-15 | End: 1900-01-01

## 2023-09-26 ENCOUNTER — APPOINTMENT (OUTPATIENT)
Dept: CARDIOLOGY | Facility: CLINIC | Age: 63
End: 2023-09-26

## 2023-09-27 NOTE — ED PROVIDER NOTE - WR INTERPRETATION DATE TIME  1
Patient stated that he has been taking the metformin one tablet BID and he has not yet picked up the glimepiride.   He will increase to metformin two tablets BID and  the glimepiride.  Reached out to the schedulers to help patient schedule with diabetic educator as soon as possible.      20-Oct-2021 02:55

## 2023-11-06 NOTE — ED ADULT NURSE NOTE - SUICIDE SCREENING DEPRESSION
Given supplies to rinse face of dried blood and escorted to the bathroom. Steady gait noted.      Dwight Murphy RN  11/06/23 0495 Negative

## 2024-04-23 NOTE — PHYSICAL THERAPY INITIAL EVALUATION ADULT - MANUAL MUSCLE TESTING RESULTS, REHAB EVAL
Medication:   levothyroxine 100 MCG tablet 90 tablet 1 11/6/2023     Last office visit date: 4/18/24  Medication Refill Protocol Failed.  Protocol approved due to: identified sig mis match, same prescription.      BUE and BLE grossly 5/5

## 2024-11-12 NOTE — ED ADULT NURSE NOTE - SKIN TURGOR
Is the patient due for a refill? Yes    Was the patient seen the past year? Yes    Date of last office visit: 09/09/2024    Does the patient have an upcoming appointment?  No    Provider to refill:BE    Does the patient have [facility]CI Plus and need 100-day supply? (This applies to ALL medications) Patient does not have SCP      resilient/elastic

## 2025-04-08 NOTE — ED ADULT NURSE NOTE - CAS DISCH TRANSFER METHOD
Private car

## 2025-05-01 NOTE — ED PROVIDER NOTE - NEURO NEGATIVE STATEMENT, MLM
[facility] EMERGENCY DEPARTMENT  EMERGENCY DEPARTMENT ENCOUNTER      Pt Name: Randal Li  MRN: [id number]  Birthdate 1962  Date of evaluation: 5/1/2025  Provider: RANDAL DIANE DO    CHIEF COMPLAINT       Chief Complaint   Patient presents with    Flank Pain     Pt from home c/o R flank pain x6 days. Pt states he does have slight pain with urination. Pt has been hospitalized in the past for UTI. Constant throbbing Pain rated 6/10.          HISTORY OF PRESENT ILLNESS   (Location/Symptom, Timing/Onset, Context/Setting, Quality, Duration, Modifying Factors, Severity)  Note limiting factors.   Randal Li is a 63 y.o. male who presents to the emergency department with a complaint of right lower abdominal pain.  Patient states that 2 days ago he noticed some pain in the right lower lateral flank area.  He points to an area just along the anterior lateral iliac crest.  Saw his primary care physician yesterday and was advised to come to the hospital for a CT scan.  He did not come yesterday.  Today the pain was a little bit worse and has moved down slightly into the lower lateral aspect of the right flank area.      He does report that he has a history of cerebral palsy.  He takes baclofen for muscle spasms.  He denies any recent fall trauma or injury.  He normally ambulates with a walker and does state that recently he feels like he cannot straighten up completely and \"walks a little bit bent over\".  He denies any associated back pain.  He denies any associated testicular pain, scrotal pain or swelling.  He denies any dysuria hematuria frequency urgency.  He denies any rash.  He denies any fever chills nausea vomiting or diarrhea.  Appetite has been normal.    He denies any recent seizures.  He denies any associated chest pain, shortness of breath cough or cold symptoms.  Appetite has been normal.  He denies any melena or hematochezia.    Medical history significant for absence seizure, cerebral  no loss of consciousness, no gait abnormality, no headache, no sensory deficits, and no weakness.

## 2025-05-08 NOTE — ED ADULT TRIAGE NOTE - TEMPERATURE IN FAHRENHEIT (DEGREES F)
"Please update Harrpeet that his echocardiogram results overall look stable in comparison to the previous check.  Ejection fraction remains mildly reduced or in the \"low normal\" range. Ideally would consider starting SGLT2 inhibitor therapy, but from a cost/coverage check it looks like these would be quite expensive at around $150 per month so cost may outweigh benefit. He can follow-up as planned with Dr. Mccartney in June. Thank you! SALVATORE Hilario, CNP     " 98.4

## 2025-05-23 NOTE — ED ADULT NURSE NOTE - ED CARDIAC RATE
Patient would like communication of their results via:    [employer]    Home Phone: [phone] (home)  Okay to leave a message containing results? Yes    Cell Phone:   Telephone Information:   Mobile [phone]     Okay to leave a message containing results? Yes     normal

## 2025-07-15 NOTE — ED ADULT TRIAGE NOTE - CCCP TRG CHIEF CMPLNT
Home Care received a Referral to Resume Care for Infusion and Nursing. We have processed the referral for a Resumption of Care on 07/16/2025.     If you have any questions or concerns, please feel free to contact us at [phone]. Follow the prompts, enter your five digit zip code, and you will be directed to your care team on [facility].      
headache